# Patient Record
Sex: MALE | Race: WHITE | NOT HISPANIC OR LATINO | Employment: OTHER | URBAN - NONMETROPOLITAN AREA
[De-identification: names, ages, dates, MRNs, and addresses within clinical notes are randomized per-mention and may not be internally consistent; named-entity substitution may affect disease eponyms.]

---

## 2020-11-05 ENCOUNTER — HOSPITAL ENCOUNTER (EMERGENCY)
Facility: HOSPITAL | Age: 60
Discharge: HOME | End: 2020-11-05
Attending: EMERGENCY MEDICINE | Admitting: EMERGENCY MEDICINE

## 2020-11-05 VITALS
BODY MASS INDEX: 22.73 KG/M2 | SYSTOLIC BLOOD PRESSURE: 147 MMHG | HEIGHT: 68 IN | DIASTOLIC BLOOD PRESSURE: 78 MMHG | OXYGEN SATURATION: 98 % | WEIGHT: 150 LBS | TEMPERATURE: 97.9 F | RESPIRATION RATE: 16 BRPM | HEART RATE: 84 BPM

## 2020-11-05 DIAGNOSIS — S61.412A LACERATION OF LEFT HAND WITHOUT FOREIGN BODY, INITIAL ENCOUNTER: Primary | ICD-10-CM

## 2020-11-05 PROCEDURE — 90715 TDAP VACCINE 7 YRS/> IM: CPT | Performed by: NURSE PRACTITIONER

## 2020-11-05 PROCEDURE — 25010000002 TDAP 5-2.5-18.5 LF-MCG/0.5 SUSPENSION: Performed by: NURSE PRACTITIONER

## 2020-11-05 PROCEDURE — 99282 EMERGENCY DEPT VISIT SF MDM: CPT

## 2020-11-05 PROCEDURE — 90471 IMMUNIZATION ADMIN: CPT | Performed by: NURSE PRACTITIONER

## 2020-11-05 PROCEDURE — 25010000003 LIDOCAINE 1 % SOLUTION: Performed by: NURSE PRACTITIONER

## 2020-11-05 RX ORDER — LIDOCAINE HYDROCHLORIDE 10 MG/ML
10 INJECTION, SOLUTION INFILTRATION; PERINEURAL ONCE
Status: COMPLETED | OUTPATIENT
Start: 2020-11-05 | End: 2020-11-05

## 2020-11-05 RX ORDER — CEPHALEXIN 500 MG/1
500 CAPSULE ORAL 3 TIMES DAILY
Qty: 21 CAPSULE | Refills: 0 | Status: SHIPPED | OUTPATIENT
Start: 2020-11-05 | End: 2021-08-16

## 2020-11-05 RX ADMIN — TETANUS TOXOID, REDUCED DIPHTHERIA TOXOID AND ACELLULAR PERTUSSIS VACCINE, ADSORBED 0.5 ML: 5; 2.5; 8; 8; 2.5 SUSPENSION INTRAMUSCULAR at 14:57

## 2020-11-05 RX ADMIN — LIDOCAINE HYDROCHLORIDE 10 ML: 10 INJECTION, SOLUTION INFILTRATION; PERINEURAL at 14:10
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
## 2021-03-23 ENCOUNTER — IMMUNIZATION (OUTPATIENT)
Dept: VACCINE CLINIC | Facility: HOSPITAL | Age: 61
End: 2021-03-23

## 2021-03-23 PROCEDURE — 0001A: CPT | Performed by: INTERNAL MEDICINE

## 2021-03-23 PROCEDURE — 91300 HC SARSCOV02 VAC 30MCG/0.3ML IM: CPT | Performed by: INTERNAL MEDICINE

## 2021-04-13 ENCOUNTER — IMMUNIZATION (OUTPATIENT)
Dept: VACCINE CLINIC | Facility: HOSPITAL | Age: 61
End: 2021-04-13

## 2021-04-13 PROCEDURE — 0002A: CPT | Performed by: INTERNAL MEDICINE

## 2021-04-13 PROCEDURE — 91300 HC SARSCOV02 VAC 30MCG/0.3ML IM: CPT | Performed by: INTERNAL MEDICINE

## 2021-08-11 ENCOUNTER — HOSPITAL ENCOUNTER (EMERGENCY)
Facility: HOSPITAL | Age: 61
Discharge: HOME | End: 2021-08-12
Attending: EMERGENCY MEDICINE | Admitting: EMERGENCY MEDICINE

## 2021-08-11 DIAGNOSIS — I50.9 ACUTE CONGESTIVE HEART FAILURE, UNSPECIFIED HEART FAILURE TYPE (HCC): Primary | ICD-10-CM

## 2021-08-11 PROCEDURE — 93005 ELECTROCARDIOGRAM TRACING: CPT | Performed by: EMERGENCY MEDICINE

## 2021-08-11 PROCEDURE — 99284 EMERGENCY DEPT VISIT MOD MDM: CPT

## 2021-08-11 RX ORDER — SODIUM CHLORIDE 0.9 % (FLUSH) 0.9 %
10 SYRINGE (ML) INJECTION AS NEEDED
Status: DISCONTINUED | OUTPATIENT
Start: 2021-08-11 | End: 2021-08-12 | Stop reason: HOSPADM

## 2021-08-12 ENCOUNTER — APPOINTMENT (OUTPATIENT)
Dept: GENERAL RADIOLOGY | Facility: HOSPITAL | Age: 61
End: 2021-08-12

## 2021-08-12 ENCOUNTER — APPOINTMENT (OUTPATIENT)
Dept: CT IMAGING | Facility: HOSPITAL | Age: 61
End: 2021-08-12

## 2021-08-12 VITALS
TEMPERATURE: 98.2 F | OXYGEN SATURATION: 95 % | SYSTOLIC BLOOD PRESSURE: 127 MMHG | BODY MASS INDEX: 25.01 KG/M2 | WEIGHT: 165 LBS | HEART RATE: 81 BPM | HEIGHT: 68 IN | DIASTOLIC BLOOD PRESSURE: 83 MMHG | RESPIRATION RATE: 19 BRPM

## 2021-08-12 LAB
ALBUMIN SERPL-MCNC: 3.8 G/DL (ref 3.5–5.2)
ALBUMIN/GLOB SERPL: 1.2 G/DL
ALP SERPL-CCNC: 98 U/L (ref 39–117)
ALT SERPL W P-5'-P-CCNC: 104 U/L (ref 1–41)
ANION GAP SERPL CALCULATED.3IONS-SCNC: 12.6 MMOL/L (ref 5–15)
AST SERPL-CCNC: 97 U/L (ref 1–40)
BASOPHILS # BLD AUTO: 0.05 10*3/MM3 (ref 0–0.2)
BASOPHILS NFR BLD AUTO: 0.7 % (ref 0–1.5)
BILIRUB SERPL-MCNC: 0.3 MG/DL (ref 0–1.2)
BUN SERPL-MCNC: 18 MG/DL (ref 8–23)
BUN/CREAT SERPL: 17 (ref 7–25)
CALCIUM SPEC-SCNC: 9.2 MG/DL (ref 8.6–10.5)
CHLORIDE SERPL-SCNC: 98 MMOL/L (ref 98–107)
CO2 SERPL-SCNC: 23.4 MMOL/L (ref 22–29)
CREAT SERPL-MCNC: 1.06 MG/DL (ref 0.76–1.27)
DEPRECATED RDW RBC AUTO: 42 FL (ref 37–54)
EOSINOPHIL # BLD AUTO: 0.02 10*3/MM3 (ref 0–0.4)
EOSINOPHIL NFR BLD AUTO: 0.3 % (ref 0.3–6.2)
ERYTHROCYTE [DISTWIDTH] IN BLOOD BY AUTOMATED COUNT: 12.5 % (ref 12.3–15.4)
GFR SERPL CREATININE-BSD FRML MDRD: 71 ML/MIN/1.73
GLOBULIN UR ELPH-MCNC: 3.3 GM/DL
GLUCOSE SERPL-MCNC: 257 MG/DL (ref 65–99)
HCT VFR BLD AUTO: 40.2 % (ref 37.5–51)
HGB BLD-MCNC: 13.3 G/DL (ref 13–17.7)
HOLD SPECIMEN: NORMAL
HOLD SPECIMEN: NORMAL
IMM GRANULOCYTES # BLD AUTO: 0.05 10*3/MM3 (ref 0–0.05)
IMM GRANULOCYTES NFR BLD AUTO: 0.7 % (ref 0–0.5)
LYMPHOCYTES # BLD AUTO: 0.82 10*3/MM3 (ref 0.7–3.1)
LYMPHOCYTES NFR BLD AUTO: 11.1 % (ref 19.6–45.3)
MCH RBC QN AUTO: 30.1 PG (ref 26.6–33)
MCHC RBC AUTO-ENTMCNC: 33.1 G/DL (ref 31.5–35.7)
MCV RBC AUTO: 91 FL (ref 79–97)
MONOCYTES # BLD AUTO: 0.16 10*3/MM3 (ref 0.1–0.9)
MONOCYTES NFR BLD AUTO: 2.2 % (ref 5–12)
NEUTROPHILS NFR BLD AUTO: 6.28 10*3/MM3 (ref 1.7–7)
NEUTROPHILS NFR BLD AUTO: 85 % (ref 42.7–76)
NRBC BLD AUTO-RTO: 0 /100 WBC (ref 0–0.2)
NT-PROBNP SERPL-MCNC: 8803 PG/ML (ref 0–900)
PLATELET # BLD AUTO: 388 10*3/MM3 (ref 140–450)
PMV BLD AUTO: 9.6 FL (ref 6–12)
POTASSIUM SERPL-SCNC: 4 MMOL/L (ref 3.5–5.2)
PROT SERPL-MCNC: 7.1 G/DL (ref 6–8.5)
RBC # BLD AUTO: 4.42 10*6/MM3 (ref 4.14–5.8)
SARS-COV-2 RNA PNL SPEC NAA+PROBE: NOT DETECTED
SODIUM SERPL-SCNC: 134 MMOL/L (ref 136–145)
TROPONIN T SERPL-MCNC: 0.04 NG/ML (ref 0–0.03)
TROPONIN T SERPL-MCNC: 0.04 NG/ML (ref 0–0.03)
WBC # BLD AUTO: 7.38 10*3/MM3 (ref 3.4–10.8)
WHOLE BLOOD HOLD SPECIMEN: NORMAL

## 2021-08-12 PROCEDURE — 83880 ASSAY OF NATRIURETIC PEPTIDE: CPT | Performed by: EMERGENCY MEDICINE

## 2021-08-12 PROCEDURE — 93005 ELECTROCARDIOGRAM TRACING: CPT | Performed by: EMERGENCY MEDICINE

## 2021-08-12 PROCEDURE — 96374 THER/PROPH/DIAG INJ IV PUSH: CPT

## 2021-08-12 PROCEDURE — 25010000002 FUROSEMIDE PER 20 MG: Performed by: EMERGENCY MEDICINE

## 2021-08-12 PROCEDURE — 71045 X-RAY EXAM CHEST 1 VIEW: CPT

## 2021-08-12 PROCEDURE — 84484 ASSAY OF TROPONIN QUANT: CPT | Performed by: EMERGENCY MEDICINE

## 2021-08-12 PROCEDURE — 87635 SARS-COV-2 COVID-19 AMP PRB: CPT | Performed by: EMERGENCY MEDICINE

## 2021-08-12 PROCEDURE — 80053 COMPREHEN METABOLIC PANEL: CPT | Performed by: EMERGENCY MEDICINE

## 2021-08-12 PROCEDURE — 25010000002 IOPAMIDOL 61 % SOLUTION: Performed by: EMERGENCY MEDICINE

## 2021-08-12 PROCEDURE — 85025 COMPLETE CBC W/AUTO DIFF WBC: CPT | Performed by: EMERGENCY MEDICINE

## 2021-08-12 PROCEDURE — 71275 CT ANGIOGRAPHY CHEST: CPT

## 2021-08-12 RX ORDER — POTASSIUM CHLORIDE 20 MEQ/1
20 TABLET, EXTENDED RELEASE ORAL DAILY
Qty: 10 TABLET | Refills: 0 | Status: SHIPPED | OUTPATIENT
Start: 2021-08-12 | End: 2021-08-26 | Stop reason: SDUPTHER

## 2021-08-12 RX ORDER — FUROSEMIDE 40 MG/1
40 TABLET ORAL DAILY
Qty: 10 TABLET | Refills: 0 | Status: SHIPPED | OUTPATIENT
Start: 2021-08-12 | End: 2021-08-26 | Stop reason: SDUPTHER

## 2021-08-12 RX ORDER — FUROSEMIDE 10 MG/ML
40 INJECTION INTRAMUSCULAR; INTRAVENOUS ONCE
Status: COMPLETED | OUTPATIENT
Start: 2021-08-12 | End: 2021-08-12

## 2021-08-12 RX ADMIN — FUROSEMIDE 40 MG: 10 INJECTION, SOLUTION INTRAMUSCULAR; INTRAVENOUS at 00:49

## 2021-08-12 RX ADMIN — IOPAMIDOL 100 ML: 612 INJECTION, SOLUTION INTRAVENOUS at 03:06
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
## 2021-08-16 ENCOUNTER — OFFICE VISIT (OUTPATIENT)
Dept: CARDIOLOGY | Facility: CLINIC | Age: 61
End: 2021-08-16

## 2021-08-16 VITALS
DIASTOLIC BLOOD PRESSURE: 70 MMHG | HEIGHT: 68 IN | WEIGHT: 165 LBS | SYSTOLIC BLOOD PRESSURE: 136 MMHG | OXYGEN SATURATION: 97 % | BODY MASS INDEX: 25.01 KG/M2 | HEART RATE: 104 BPM

## 2021-08-16 DIAGNOSIS — Z72.0 TOBACCO USE: ICD-10-CM

## 2021-08-16 DIAGNOSIS — R06.02 SOB (SHORTNESS OF BREATH): ICD-10-CM

## 2021-08-16 DIAGNOSIS — J43.2 CENTRILOBULAR EMPHYSEMA (HCC): ICD-10-CM

## 2021-08-16 DIAGNOSIS — Z72.0 TOBACCO ABUSE: ICD-10-CM

## 2021-08-16 DIAGNOSIS — I10 ESSENTIAL HYPERTENSION: Primary | ICD-10-CM

## 2021-08-16 PROCEDURE — 99204 OFFICE O/P NEW MOD 45 MIN: CPT | Performed by: INTERNAL MEDICINE

## 2021-08-16 NOTE — PROGRESS NOTES
"    Subjective:     Encounter Date:08/16/2021      Patient ID: Tristan Castle is a 61 y.o. male.    Chief Complaint: Shortness of breath  HPI  This is a 61-year-old male patient who presents to cardiology clinic for evaluation of dyspnea.  The patient indicates that he developed acute onset of shortness of breath approximately 2 weeks ago.  This occurred both at rest and with activity.  The patient presented to the emergency room where he underwent a spiral CT scan of the chest which showed no evidence of pulmonary embolus.  The patient was demonstrated to have diffuse emphysema as well as evidence of interstitial pulmonary edema.  The patient's proBNP was severely elevated.  Cardiac troponins were minimally elevated in the 0.03 ng/dL range on serial testing.  The patient's twelve-lead electrocardiogram showed sinus rhythm with no ischemic ST-T wave changes or injury current.  The patient denied having chest discomfort at rest or with activity.  He has had no orthopnea PND or lower extremity edema.  He denies dizziness palpitations or syncope.  The patient has a history of hypertension and dyslipidemia but no personal history of diabetes.  The patient indicates that he was smoking 2 packs of cigarettes per day beginning at age 11.  He indicates he has \"cut back\" to approximately 1/2 pack of cigarettes per day.  He has never had an ischemic evaluation.  The patient indicates that he is unable to do treadmill exercise stress testing due to the severity of his dyspnea.  The following portions of the patient's history were reviewed and updated as appropriate: allergies, current medications, past family history, past medical history, past social history, past surgical history and problem  Review of Systems   Constitutional: Negative for chills, diaphoresis, fever, malaise/fatigue, weight gain and weight loss.   HENT: Negative for ear discharge, hearing loss, hoarse voice and nosebleeds.    Eyes: Negative for discharge, " double vision, pain and photophobia.   Cardiovascular: Positive for dyspnea on exertion. Negative for chest pain, claudication, cyanosis, irregular heartbeat, leg swelling, near-syncope, orthopnea, palpitations, paroxysmal nocturnal dyspnea and syncope.   Respiratory: Positive for shortness of breath. Negative for cough, hemoptysis, sputum production and wheezing.    Endocrine: Negative for cold intolerance, heat intolerance, polydipsia, polyphagia and polyuria.   Hematologic/Lymphatic: Negative for adenopathy and bleeding problem. Does not bruise/bleed easily.   Skin: Negative for color change, flushing, itching and rash.   Musculoskeletal: Negative for muscle cramps, muscle weakness, myalgias and stiffness.   Gastrointestinal: Negative for abdominal pain, diarrhea, hematemesis, hematochezia, nausea and vomiting.   Genitourinary: Negative for dysuria, frequency and nocturia.   Neurological: Negative for focal weakness, loss of balance, numbness, paresthesias and seizures.   Psychiatric/Behavioral: Negative for altered mental status, hallucinations and suicidal ideas.   Allergic/Immunologic: Negative for HIV exposure, hives and persistent infections.           Current Outpatient Medications:   •  furosemide (LASIX) 40 MG tablet, Take 1 tablet by mouth Daily., Disp: 10 tablet, Rfl: 0  •  potassium chloride (K-DUR,KLOR-CON) 20 MEQ CR tablet, Take 1 tablet by mouth Daily., Disp: 10 tablet, Rfl: 0    Objective:   Vitals and nursing note reviewed.   Constitutional:       Appearance: Healthy appearance. Not in distress.   Neck:      Vascular: No JVR. JVD normal.   Pulmonary:      Effort: Pulmonary effort is normal.      Breath sounds: Normal breath sounds. No wheezing. No rhonchi. No rales.   Chest:      Chest wall: Not tender to palpatation.   Cardiovascular:      PMI at left midclavicular line. Normal rate. Regular rhythm. Normal S1. Normal S2.      Murmurs: There is no murmur.      No gallop. No click. No rub.  "  Pulses:     Intact distal pulses.   Edema:     Peripheral edema absent.   Abdominal:      General: Bowel sounds are normal.      Palpations: Abdomen is soft.      Tenderness: There is no abdominal tenderness.   Musculoskeletal: Normal range of motion.         General: No tenderness. Skin:     General: Skin is warm and dry.   Neurological:      General: No focal deficit present.      Mental Status: Alert and oriented to person, place and time.       Blood pressure 136/70, pulse 104, height 172.7 cm (68\"), weight 74.8 kg (165 lb), SpO2 97 %.   Lab Review:     Assessment:       1. Essential hypertension  Less than ideal blood pressure control.  - Stress Test With Myocardial Perfusion Two Day  - Adult Transthoracic Echo Complete W/ Cont if Necessary Per Protocol    2. SOB (shortness of breath)  Multifactorial.  Shortness of breath with activity that is relieved with rest is typical of the diagnosis of angina pectoris.  The patient has multiple risk factors for coronary artery disease and has never had an ischemic evaluation.  He is unable to do treadmill exercise stress testing due to the severity of his shortness of breath-effort limiting dyspnea during instrumental activities of daily living.  In addition, the patient appears to have new onset congestive heart failure.  Left ventricular ejection fraction is unknown.  There is certainly an element related to his underlying lung disease and ongoing tobacco use.  - Stress Test With Myocardial Perfusion Two Day  - Adult Transthoracic Echo Complete W/ Cont if Necessary Per Protocol    3. Tobacco abuse  Ongoing daily tobacco abuse.  The patient thinks that he can quit smoking \"by cutting back\".    4. Centrilobular emphysema (CMS/HCC)  Typical tobacco related lung disease.        Procedures    Plan:     I have recommended a vasodilator nuclear stress test utilizing a 2-day imaging protocol with supine and stress prone images in order to help mitigate for attenuation " artifact given the patient's body habitus.  I have recommended an echocardiogram.  The patient has been counseled regarding the essential need to discontinue all forms of tobacco exposure.  Further recommendations will be predicated on the results of his outpatient testing.

## 2021-08-26 ENCOUNTER — HOSPITAL ENCOUNTER (EMERGENCY)
Facility: HOSPITAL | Age: 61
Discharge: HOME | End: 2021-08-26
Attending: FAMILY MEDICINE | Admitting: FAMILY MEDICINE

## 2021-08-26 ENCOUNTER — APPOINTMENT (OUTPATIENT)
Dept: GENERAL RADIOLOGY | Facility: HOSPITAL | Age: 61
End: 2021-08-26

## 2021-08-26 VITALS
HEIGHT: 68 IN | HEART RATE: 95 BPM | TEMPERATURE: 98.4 F | BODY MASS INDEX: 25.07 KG/M2 | SYSTOLIC BLOOD PRESSURE: 133 MMHG | RESPIRATION RATE: 28 BRPM | DIASTOLIC BLOOD PRESSURE: 76 MMHG | WEIGHT: 165.4 LBS | OXYGEN SATURATION: 95 %

## 2021-08-26 DIAGNOSIS — I50.9 ACUTE ON CHRONIC CONGESTIVE HEART FAILURE, UNSPECIFIED HEART FAILURE TYPE (HCC): Primary | ICD-10-CM

## 2021-08-26 LAB
A-A DO2: 44.4 MMHG
ALBUMIN SERPL-MCNC: 4.2 G/DL (ref 3.5–5.2)
ALBUMIN/GLOB SERPL: 1.2 G/DL
ALP SERPL-CCNC: 83 U/L (ref 39–117)
ALT SERPL W P-5'-P-CCNC: 23 U/L (ref 1–41)
ANION GAP SERPL CALCULATED.3IONS-SCNC: 10.1 MMOL/L (ref 5–15)
ARTERIAL PATENCY WRIST A: POSITIVE
AST SERPL-CCNC: 23 U/L (ref 1–40)
ATMOSPHERIC PRESS: 738 MMHG
BASE EXCESS BLDA CALC-SCNC: 0.7 MMOL/L (ref 0–2)
BASOPHILS # BLD AUTO: 0.08 10*3/MM3 (ref 0–0.2)
BASOPHILS NFR BLD AUTO: 0.7 % (ref 0–1.5)
BDY SITE: ABNORMAL
BILIRUB SERPL-MCNC: 0.5 MG/DL (ref 0–1.2)
BILIRUB UR QL STRIP: NEGATIVE
BUN SERPL-MCNC: 16 MG/DL (ref 8–23)
BUN/CREAT SERPL: 15.2 (ref 7–25)
CALCIUM SPEC-SCNC: 9.4 MG/DL (ref 8.6–10.5)
CHLORIDE SERPL-SCNC: 101 MMOL/L (ref 98–107)
CLARITY UR: CLEAR
CO2 SERPL-SCNC: 24.9 MMOL/L (ref 22–29)
COHGB MFR BLD: 1.5 % (ref 0–2)
COLOR UR: YELLOW
CREAT SERPL-MCNC: 1.05 MG/DL (ref 0.76–1.27)
CRP SERPL-MCNC: 0.54 MG/DL (ref 0–0.5)
DEPRECATED RDW RBC AUTO: 40.4 FL (ref 37–54)
EOSINOPHIL # BLD AUTO: 0.19 10*3/MM3 (ref 0–0.4)
EOSINOPHIL NFR BLD AUTO: 1.6 % (ref 0.3–6.2)
ERYTHROCYTE [DISTWIDTH] IN BLOOD BY AUTOMATED COUNT: 12.2 % (ref 12.3–15.4)
GFR SERPL CREATININE-BSD FRML MDRD: 72 ML/MIN/1.73
GLOBULIN UR ELPH-MCNC: 3.5 GM/DL
GLUCOSE SERPL-MCNC: 121 MG/DL (ref 65–99)
GLUCOSE UR STRIP-MCNC: NEGATIVE MG/DL
HCO3 BLDA-SCNC: 24.6 MMOL/L (ref 22–28)
HCT VFR BLD AUTO: 42.4 % (ref 37.5–51)
HCT VFR BLD CALC: 39.6 %
HGB BLD-MCNC: 13.9 G/DL (ref 13–17.7)
HGB UR QL STRIP.AUTO: NEGATIVE
HOLD SPECIMEN: NORMAL
HOLD SPECIMEN: NORMAL
IMM GRANULOCYTES # BLD AUTO: 0.05 10*3/MM3 (ref 0–0.05)
IMM GRANULOCYTES NFR BLD AUTO: 0.4 % (ref 0–0.5)
INHALED O2 CONCENTRATION: 21 %
KETONES UR QL STRIP: NEGATIVE
LEUKOCYTE ESTERASE UR QL STRIP.AUTO: NEGATIVE
LYMPHOCYTES # BLD AUTO: 3.26 10*3/MM3 (ref 0.7–3.1)
LYMPHOCYTES NFR BLD AUTO: 27.9 % (ref 19.6–45.3)
Lab: ABNORMAL
MCH RBC QN AUTO: 29.6 PG (ref 26.6–33)
MCHC RBC AUTO-ENTMCNC: 32.8 G/DL (ref 31.5–35.7)
MCV RBC AUTO: 90.2 FL (ref 79–97)
METHGB BLD QL: 0.3 % (ref 0–1.5)
MODALITY: ABNORMAL
MONOCYTES # BLD AUTO: 0.68 10*3/MM3 (ref 0.1–0.9)
MONOCYTES NFR BLD AUTO: 5.8 % (ref 5–12)
NEUTROPHILS NFR BLD AUTO: 63.6 % (ref 42.7–76)
NEUTROPHILS NFR BLD AUTO: 7.42 10*3/MM3 (ref 1.7–7)
NITRITE UR QL STRIP: NEGATIVE
NOTE: ABNORMAL
NRBC BLD AUTO-RTO: 0 /100 WBC (ref 0–0.2)
NT-PROBNP SERPL-MCNC: 6809 PG/ML (ref 0–900)
OXYHGB MFR BLDV: 91.7 % (ref 94–99)
PCO2 BLDA: 36.2 MM HG (ref 35–45)
PCO2 TEMP ADJ BLD: ABNORMAL MM[HG]
PH BLDA: 7.44 PH UNITS (ref 7.35–7.45)
PH UR STRIP.AUTO: 7 [PH] (ref 5–8)
PH, TEMP CORRECTED: ABNORMAL
PLATELET # BLD AUTO: 315 10*3/MM3 (ref 140–450)
PMV BLD AUTO: 9.2 FL (ref 6–12)
PO2 BLDA: 59.9 MM HG (ref 75–100)
PO2 TEMP ADJ BLD: ABNORMAL MM[HG]
POTASSIUM SERPL-SCNC: 4.2 MMOL/L (ref 3.5–5.2)
PROT SERPL-MCNC: 7.7 G/DL (ref 6–8.5)
PROT UR QL STRIP: NEGATIVE
RBC # BLD AUTO: 4.7 10*6/MM3 (ref 4.14–5.8)
SAO2 % BLDCOA: 93.4 % (ref 94–100)
SODIUM SERPL-SCNC: 136 MMOL/L (ref 136–145)
SP GR UR STRIP: 1.01 (ref 1–1.03)
TROPONIN T SERPL-MCNC: 0.02 NG/ML (ref 0–0.03)
UROBILINOGEN UR QL STRIP: NORMAL
VENTILATOR MODE: ABNORMAL
WBC # BLD AUTO: 11.68 10*3/MM3 (ref 3.4–10.8)
WHOLE BLOOD HOLD SPECIMEN: NORMAL
WHOLE BLOOD HOLD SPECIMEN: NORMAL

## 2021-08-26 PROCEDURE — 25010000002 FUROSEMIDE PER 20 MG: Performed by: FAMILY MEDICINE

## 2021-08-26 PROCEDURE — 71045 X-RAY EXAM CHEST 1 VIEW: CPT

## 2021-08-26 PROCEDURE — 36600 WITHDRAWAL OF ARTERIAL BLOOD: CPT

## 2021-08-26 PROCEDURE — 83050 HGB METHEMOGLOBIN QUAN: CPT

## 2021-08-26 PROCEDURE — 25010000002 METHYLPREDNISOLONE PER 125 MG: Performed by: FAMILY MEDICINE

## 2021-08-26 PROCEDURE — 84484 ASSAY OF TROPONIN QUANT: CPT | Performed by: FAMILY MEDICINE

## 2021-08-26 PROCEDURE — 83880 ASSAY OF NATRIURETIC PEPTIDE: CPT | Performed by: FAMILY MEDICINE

## 2021-08-26 PROCEDURE — 99283 EMERGENCY DEPT VISIT LOW MDM: CPT

## 2021-08-26 PROCEDURE — 86140 C-REACTIVE PROTEIN: CPT | Performed by: FAMILY MEDICINE

## 2021-08-26 PROCEDURE — 94640 AIRWAY INHALATION TREATMENT: CPT

## 2021-08-26 PROCEDURE — 96375 TX/PRO/DX INJ NEW DRUG ADDON: CPT

## 2021-08-26 PROCEDURE — 85025 COMPLETE CBC W/AUTO DIFF WBC: CPT | Performed by: FAMILY MEDICINE

## 2021-08-26 PROCEDURE — 96374 THER/PROPH/DIAG INJ IV PUSH: CPT

## 2021-08-26 PROCEDURE — 80053 COMPREHEN METABOLIC PANEL: CPT | Performed by: FAMILY MEDICINE

## 2021-08-26 PROCEDURE — 93005 ELECTROCARDIOGRAM TRACING: CPT | Performed by: FAMILY MEDICINE

## 2021-08-26 PROCEDURE — 82375 ASSAY CARBOXYHB QUANT: CPT

## 2021-08-26 PROCEDURE — 94799 UNLISTED PULMONARY SVC/PX: CPT

## 2021-08-26 PROCEDURE — 82805 BLOOD GASES W/O2 SATURATION: CPT

## 2021-08-26 PROCEDURE — 81003 URINALYSIS AUTO W/O SCOPE: CPT | Performed by: FAMILY MEDICINE

## 2021-08-26 RX ORDER — FUROSEMIDE 40 MG/1
40 TABLET ORAL DAILY
Qty: 6 TABLET | Refills: 0 | Status: SHIPPED | OUTPATIENT
Start: 2021-08-26 | End: 2021-08-30 | Stop reason: SDUPTHER

## 2021-08-26 RX ORDER — FUROSEMIDE 10 MG/ML
40 INJECTION INTRAMUSCULAR; INTRAVENOUS ONCE
Status: COMPLETED | OUTPATIENT
Start: 2021-08-26 | End: 2021-08-26

## 2021-08-26 RX ORDER — METHYLPREDNISOLONE SODIUM SUCCINATE 125 MG/2ML
125 INJECTION, POWDER, LYOPHILIZED, FOR SOLUTION INTRAMUSCULAR; INTRAVENOUS ONCE
Status: COMPLETED | OUTPATIENT
Start: 2021-08-26 | End: 2021-08-26

## 2021-08-26 RX ORDER — POTASSIUM CHLORIDE 20 MEQ/1
20 TABLET, EXTENDED RELEASE ORAL DAILY
Qty: 6 TABLET | Refills: 0 | Status: SHIPPED | OUTPATIENT
Start: 2021-08-26 | End: 2021-09-01

## 2021-08-26 RX ORDER — SODIUM CHLORIDE 0.9 % (FLUSH) 0.9 %
10 SYRINGE (ML) INJECTION AS NEEDED
Status: DISCONTINUED | OUTPATIENT
Start: 2021-08-26 | End: 2021-08-26 | Stop reason: HOSPADM

## 2021-08-26 RX ORDER — IPRATROPIUM BROMIDE AND ALBUTEROL SULFATE 2.5; .5 MG/3ML; MG/3ML
3 SOLUTION RESPIRATORY (INHALATION) ONCE
Status: COMPLETED | OUTPATIENT
Start: 2021-08-26 | End: 2021-08-26

## 2021-08-26 RX ADMIN — METHYLPREDNISOLONE SODIUM SUCCINATE 125 MG: 125 INJECTION, POWDER, FOR SOLUTION INTRAMUSCULAR; INTRAVENOUS at 02:58

## 2021-08-26 RX ADMIN — IPRATROPIUM BROMIDE AND ALBUTEROL SULFATE 3 ML: .5; 3 SOLUTION RESPIRATORY (INHALATION) at 02:14

## 2021-08-26 RX ADMIN — FUROSEMIDE 40 MG: 10 INJECTION INTRAMUSCULAR; INTRAVENOUS at 02:59
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
## 2021-08-30 ENCOUNTER — TELEPHONE (OUTPATIENT)
Dept: CARDIOLOGY | Facility: CLINIC | Age: 61
End: 2021-08-30

## 2021-08-30 DIAGNOSIS — I50.9 ACUTE CONGESTIVE HEART FAILURE, UNSPECIFIED HEART FAILURE TYPE (HCC): Primary | ICD-10-CM

## 2021-08-30 RX ORDER — FUROSEMIDE 40 MG/1
40 TABLET ORAL 2 TIMES DAILY
Qty: 6 TABLET | Refills: 0 | Status: SHIPPED | OUTPATIENT
Start: 2021-08-30 | End: 2021-09-01

## 2021-08-30 NOTE — TELEPHONE ENCOUNTER
Patient was recently seen in [facility]  for CHF and SOA. Patient was given a 6 day supply of lasix 40 mg qday for his fluid buildup. States he is still having SOA and will soon be out of the medication. Patient is requesting a refill if possible or wanting to know if he should be seen before he can have a refill.  Please advise.    Patients pharmacy is [facility] in [place].

## 2021-08-30 NOTE — TELEPHONE ENCOUNTER
Spoke with patient, information was given and understood. Patient was scheduled for a follow up with JOSE Marquez post echo appointment on 9/1/2021.

## 2021-08-30 NOTE — TELEPHONE ENCOUNTER
Called in more lasix.  Take one this evening and one in the morning.  I will see him in the office tomorrow. I called him in 3 days worth if he takes twice a day.  Also I called him in some STAT labs he can complete before he comes in (if he can). Thanks!

## 2021-08-31 ENCOUNTER — TELEPHONE (OUTPATIENT)
Dept: CARDIOLOGY | Facility: CLINIC | Age: 61
End: 2021-08-31

## 2021-08-31 ENCOUNTER — LAB (OUTPATIENT)
Dept: LAB | Facility: HOSPITAL | Age: 61
End: 2021-08-31

## 2021-08-31 DIAGNOSIS — I50.9 ACUTE CONGESTIVE HEART FAILURE, UNSPECIFIED HEART FAILURE TYPE (HCC): ICD-10-CM

## 2021-08-31 LAB
ANION GAP SERPL CALCULATED.3IONS-SCNC: 10.2 MMOL/L (ref 5–15)
BUN SERPL-MCNC: 32 MG/DL (ref 8–23)
BUN/CREAT SERPL: 27.1 (ref 7–25)
CALCIUM SPEC-SCNC: 9.3 MG/DL (ref 8.6–10.5)
CHLORIDE SERPL-SCNC: 97 MMOL/L (ref 98–107)
CO2 SERPL-SCNC: 27.8 MMOL/L (ref 22–29)
CREAT SERPL-MCNC: 1.18 MG/DL (ref 0.76–1.27)
GFR SERPL CREATININE-BSD FRML MDRD: 63 ML/MIN/1.73
GLUCOSE SERPL-MCNC: 155 MG/DL (ref 65–99)
NT-PROBNP SERPL-MCNC: 6368 PG/ML (ref 0–900)
POTASSIUM SERPL-SCNC: 3.9 MMOL/L (ref 3.5–5.2)
SODIUM SERPL-SCNC: 135 MMOL/L (ref 136–145)

## 2021-08-31 PROCEDURE — 36415 COLL VENOUS BLD VENIPUNCTURE: CPT

## 2021-08-31 PROCEDURE — 83880 ASSAY OF NATRIURETIC PEPTIDE: CPT

## 2021-08-31 PROCEDURE — 80048 BASIC METABOLIC PNL TOTAL CA: CPT

## 2021-08-31 NOTE — TELEPHONE ENCOUNTER
Lab called with abn values on patient felicity Castle that you are to see tomorrow. He is also having an ECHO tomorrow.    ProBNP 6,368

## 2021-09-01 ENCOUNTER — TELEPHONE (OUTPATIENT)
Dept: CARDIOLOGY | Facility: CLINIC | Age: 61
End: 2021-09-01

## 2021-09-01 ENCOUNTER — OFFICE VISIT (OUTPATIENT)
Dept: CARDIOLOGY | Facility: CLINIC | Age: 61
End: 2021-09-01

## 2021-09-01 VITALS
BODY MASS INDEX: 24.71 KG/M2 | RESPIRATION RATE: 18 BRPM | DIASTOLIC BLOOD PRESSURE: 82 MMHG | WEIGHT: 163 LBS | SYSTOLIC BLOOD PRESSURE: 132 MMHG | HEART RATE: 105 BPM | OXYGEN SATURATION: 96 % | HEIGHT: 68 IN

## 2021-09-01 DIAGNOSIS — I10 ESSENTIAL HYPERTENSION: Primary | ICD-10-CM

## 2021-09-01 DIAGNOSIS — R79.89 ELEVATED BRAIN NATRIURETIC PEPTIDE (BNP) LEVEL: ICD-10-CM

## 2021-09-01 PROCEDURE — 99213 OFFICE O/P EST LOW 20 MIN: CPT | Performed by: NURSE PRACTITIONER

## 2021-09-01 RX ORDER — FUROSEMIDE 40 MG/1
TABLET ORAL
Qty: 9 TABLET | Refills: 0 | Status: SHIPPED | OUTPATIENT
Start: 2021-09-01 | End: 2021-09-07

## 2021-09-01 RX ORDER — POTASSIUM CHLORIDE 20 MEQ/1
20 TABLET, EXTENDED RELEASE ORAL DAILY
Qty: 3 TABLET | Refills: 0 | Status: SHIPPED | OUTPATIENT
Start: 2021-09-01 | End: 2021-09-09

## 2021-09-01 RX ORDER — CARVEDILOL 3.12 MG/1
3.12 TABLET ORAL 2 TIMES DAILY
Qty: 60 TABLET | Refills: 11 | Status: SHIPPED | OUTPATIENT
Start: 2021-09-01 | End: 2021-09-21

## 2021-09-01 NOTE — PROGRESS NOTES
"             [facility] Cardiology Office Follow Up Note    Tristan Castle  [id number]  09/01/2021    Primary Care Provider: Marybel Fleming MD   Referring Provider: No ref. provider found    Chief Complaint: Dyspnea    History of Present Illness:   Mr. Tristan Castle is a 61 y.o. male who presents to the Cardiology Clinic for follow up of dyspnea.  The patient recently presented to the ED with a 2-week history of acute onset of shortness of breath.  This occurred both at rest and with activity.  He underwent a spiral CT scan of the chest which showed no evidence of pulmonary embolus.  The patient was demonstrated to have diffuse emphysema as well as evidence of interstitial pulmonary edema.  The patient's proBNP was severely elevated.  Cardiac troponins were minimally elevated in the 0.03 ng/dL range on serial testing.  The patient's twelve-lead electrocardiogram showed sinus rhythm with no ischemic ST-T wave changes or injury current.  The patient denied having chest discomfort at rest or with activity.  The patient has a history of hypertension and dyslipidemia but no personal history of diabetes.  The patient has a history of a 2 pack per day cigarettes habit beginning at age 11.  He recently indicated that he had \"cut back\" to approximately 1/2 pack of cigarettes per day.  He has never had an ischemic evaluation.  An echocardiogram and vasodilator nuclear stress test was order.  The patient was prescribed a short course of twice daily Lasix.  He presents today for follow-up.  The patient reports ongoing dyspnea on exertion and with lying down x3 days.  He reports minimal improvement since he started twice daily Lasix approximately 3 days ago.  He is currently not on a fluid- or sodium-restricted diet.  He specifically denies chest pain, palpitations, lower extremity edema.  The patient reports that he is \"smothering\" to the extent he finds it difficult to sleep.  He does tell me he has a primary care " "provider, Dr. Fleming, but he has not seen her in \"years\".  He is unsure if he has diabetes or high cholesterol.  He does report having neuropathy in his feet.  His wife Vikram accompanies him today and reports that he is going to have a primary care appointment scheduled in the near future.  The patient is also confused because he thought he was to undergo a nuclear stress test today.  He is trying to quit smoking.  He offers no other complaints or concerns at this time.    Past Cardiac Testin. Last Coronary Angio: None  2. Prior Stress Testing: Pending  3. Last Echo: Pending  4. Prior Holter Monitor: None    Review of Systems:   Review of Systems   Constitutional: Negative for activity change, chills, diaphoresis, fatigue, fever and unexpected weight gain.   Eyes: Negative for blurred vision and visual disturbance.   Respiratory: Positive for shortness of breath. Negative for apnea, cough, chest tightness and wheezing.    Cardiovascular: Negative for chest pain, palpitations and leg swelling.   Gastrointestinal: Negative for abdominal distention, blood in stool, GERD and indigestion.   Endocrine: Negative for cold intolerance and heat intolerance.   Genitourinary: Negative for hematuria.   Musculoskeletal: Negative for gait problem, joint swelling and myalgias.   Skin: Negative for color change, pallor and bruise.   Neurological: Negative for dizziness, seizures, syncope, weakness, light-headedness, numbness, headache and confusion.   Hematological: Does not bruise/bleed easily.   Psychiatric/Behavioral: Negative for behavioral problems, sleep disturbance, suicidal ideas and depressed mood.     I have reviewed and confirmed the accuracy of the ROS as documented by the MA/TERESO/RN JOSE Ching    I have reviewed and/or updated the patient's past medical, past surgical, family, social history, problem list and allergies as appropriate.     Medications:     Current Outpatient Medications:   •  " "carvedilol (COREG) 3.125 MG tablet, Take 1 tablet by mouth 2 (Two) Times a Day., Disp: 60 tablet, Rfl: 11  •  furosemide (LASIX) 40 MG tablet, TAKE 2 TABLETS IN Q AM.  TAKE 1 TABLET IN THE AFTERNOON., Disp: 9 tablet, Rfl: 0  •  potassium chloride (K-DUR,KLOR-CON) 20 MEQ CR tablet, Take 1 tablet by mouth Daily., Disp: 3 tablet, Rfl: 0    Physical Exam:  Vital Signs:   Vitals:    09/01/21 1255   BP: 132/82   BP Location: Left arm   Patient Position: Sitting   Pulse: 105   Resp: 18   SpO2: 96%   Weight: 73.9 kg (163 lb)   Height: 172.7 cm (68\")     Body mass index is 24.78 kg/m².    Physical Exam  Vitals and nursing note reviewed.   Constitutional:       General: He is not in acute distress.     Appearance: Normal appearance. He is well-developed.   HENT:      Head: Normocephalic and atraumatic.      Mouth/Throat:      Mouth: Mucous membranes are moist.   Eyes:      General: No scleral icterus.     Extraocular Movements: Extraocular movements intact.   Neck:      Trachea: Trachea normal.   Cardiovascular:      Rate and Rhythm: Normal rate and regular rhythm.      Pulses: Normal pulses.      Heart sounds: Normal heart sounds. No murmur heard.   No friction rub. No gallop.    Pulmonary:      Effort: Pulmonary effort is normal.      Breath sounds: Normal breath sounds.      Comments: Rhonchi in left middle lobe.  Wheezing in right lower lobe.  No crackles appreciated.  Abdominal:      Palpations: Abdomen is soft.      Tenderness: There is no abdominal tenderness.   Musculoskeletal:         General: Normal range of motion.      Cervical back: Neck supple.      Right lower leg: No edema.      Left lower leg: No edema.   Skin:     General: Skin is warm and dry.      Findings: No bruising, lesion or rash.   Neurological:      Mental Status: He is alert and oriented to person, place, and time.      Motor: No weakness.      Gait: Gait normal.   Psychiatric:         Mood and Affect: Mood normal.         Behavior: Behavior " normal. Behavior is cooperative.         Thought Content: Thought content does not include suicidal ideation.         Results Review:   I reviewed the patient's new clinical results.      Assessment / Plan:     1. Essential hypertension (Primary)  --Less than ideal in the presence of tachycardia and a recent elevated BNP  --Echocardiogram pending  --START carvedilol  --Follow-up with Dr. Meyers next week as scheduled    2. Elevated brain natriuretic peptide (BNP) level  --Minimal improvement of BNP (6809 on 8/26 and 6368 on 8/31)  --Most recent creatinine 1.18 (marginally increased from 1.05 on 8/26)  --Increase Lasix to 80 mg q am and 40 mg in the afternoon x's 3 days  --Continue potassium chloride supplements x's 3 days to be taken with am diuretic  --Educated patient to adhere to strict sodium (1500 mg) and fluid (1.5 liter) restrictions  --Patient counseled regarding foods high in sodium foods and advised to avoid these  --Sodium log given to patient  --Follow-up with Dr. Meyers next week as scheduled      Preventative Cardiology:   Tobacco Cessation: Cessation Counseling Provided    Advance Care Planning: ACP discussion was held with the patient during this visit. Patient does not have an advance directive, information provided.     Follow Up:   Return in about 1 week (around 9/8/2021) for Follow-up with Dr. Meyers.      Thank you for allowing me to participate in the care of your patient. Please to not hesitate to contact me with additional questions or concerns.     JOSE Moya

## 2021-09-01 NOTE — TELEPHONE ENCOUNTER
Can someone please call this patient at home and let him know that he can call the following number to schedule his nuclear stress test in the hospital?  The phone number is [phone].  It looks like the hospital has left a few voicemails for him this week but they have not been able to contact him yet.  Thanks.

## 2021-09-02 LAB
BH CV ECHO MEAS - % IVS THICK: 5 %
BH CV ECHO MEAS - % LVPW THICK: 20 %
BH CV ECHO MEAS - AI DEC SLOPE: 169 CM/SEC^2
BH CV ECHO MEAS - AI MAX PG: 33.4 MMHG
BH CV ECHO MEAS - AI MAX VEL: 289 CM/SEC
BH CV ECHO MEAS - AI P1/2T: 500.9 MSEC
BH CV ECHO MEAS - AO MAX PG (FULL): 19.7 MMHG
BH CV ECHO MEAS - AO MAX PG: 22 MMHG
BH CV ECHO MEAS - AO MEAN PG (FULL): 7.6 MMHG
BH CV ECHO MEAS - AO MEAN PG: 8.6 MMHG
BH CV ECHO MEAS - AO ROOT AREA (BSA CORRECTED): 0.8
BH CV ECHO MEAS - AO ROOT AREA: 1.8 CM^2
BH CV ECHO MEAS - AO ROOT DIAM: 1.5 CM
BH CV ECHO MEAS - AO V2 MAX: 236.2 CM/SEC
BH CV ECHO MEAS - AO V2 MEAN: 131.8 CM/SEC
BH CV ECHO MEAS - AO V2 VTI: 32.1 CM
BH CV ECHO MEAS - AVA(I,A): 0.94 CM^2
BH CV ECHO MEAS - AVA(I,D): 0.94 CM^2
BH CV ECHO MEAS - AVA(V,A): 1 CM^2
BH CV ECHO MEAS - AVA(V,D): 1 CM^2
BH CV ECHO MEAS - BSA(HAYCOCK): 1.9 M^2
BH CV ECHO MEAS - BSA: 1.9 M^2
BH CV ECHO MEAS - BZI_BMI: 24.8 KILOGRAMS/M^2
BH CV ECHO MEAS - BZI_METRIC_HEIGHT: 172.7 CM
BH CV ECHO MEAS - BZI_METRIC_WEIGHT: 73.9 KG
BH CV ECHO MEAS - EDV(CUBED): 153.1 ML
BH CV ECHO MEAS - EDV(MOD-SP4): 206 ML
BH CV ECHO MEAS - EDV(TEICH): 138.3 ML
BH CV ECHO MEAS - EF(CUBED): 48.1 %
BH CV ECHO MEAS - EF(MOD-SP4): 34 %
BH CV ECHO MEAS - EF(TEICH): 39.9 %
BH CV ECHO MEAS - ESV(CUBED): 79.5 ML
BH CV ECHO MEAS - ESV(MOD-SP4): 136 ML
BH CV ECHO MEAS - ESV(TEICH): 83.1 ML
BH CV ECHO MEAS - FS: 19.6 %
BH CV ECHO MEAS - IVS/LVPW: 1
BH CV ECHO MEAS - IVSD: 1 CM
BH CV ECHO MEAS - IVSS: 1.1 CM
BH CV ECHO MEAS - LA DIMENSION: 4.4 CM
BH CV ECHO MEAS - LA/AO: 2.9
BH CV ECHO MEAS - LAD MAJOR: 6.1 CM
BH CV ECHO MEAS - LAT PEAK E' VEL: 9.5 CM/SEC
BH CV ECHO MEAS - LATERAL E/E' RATIO: 9.6
BH CV ECHO MEAS - LV DIASTOLIC VOL/BSA (35-75): 109.9 ML/M^2
BH CV ECHO MEAS - LV IVRT: 0.07 SEC
BH CV ECHO MEAS - LV MASS(C)D: 203.6 GRAMS
BH CV ECHO MEAS - LV MASS(C)DI: 108.6 GRAMS/M^2
BH CV ECHO MEAS - LV MASS(C)S: 168.3 GRAMS
BH CV ECHO MEAS - LV MASS(C)SI: 89.8 GRAMS/M^2
BH CV ECHO MEAS - LV MAX PG: 2.3 MMHG
BH CV ECHO MEAS - LV MEAN PG: 1 MMHG
BH CV ECHO MEAS - LV SYSTOLIC VOL/BSA (12-30): 72.6 ML/M^2
BH CV ECHO MEAS - LV V1 MAX: 75.8 CM/SEC
BH CV ECHO MEAS - LV V1 MEAN: 45.6 CM/SEC
BH CV ECHO MEAS - LV V1 VTI: 9.6 CM
BH CV ECHO MEAS - LVIDD: 5.4 CM
BH CV ECHO MEAS - LVIDS: 4.3 CM
BH CV ECHO MEAS - LVLD AP4: 9.6 CM
BH CV ECHO MEAS - LVLS AP4: 9.1 CM
BH CV ECHO MEAS - LVOT AREA (M): 3.1 CM^2
BH CV ECHO MEAS - LVOT AREA: 3.1 CM^2
BH CV ECHO MEAS - LVOT DIAM: 2 CM
BH CV ECHO MEAS - LVPWD: 1 CM
BH CV ECHO MEAS - LVPWS: 1.2 CM
BH CV ECHO MEAS - MED PEAK E' VEL: 7.2 CM/SEC
BH CV ECHO MEAS - MEDIAL E/E' RATIO: 12.6
BH CV ECHO MEAS - MR MAX PG: 85 MMHG
BH CV ECHO MEAS - MR MAX VEL: 460 CM/SEC
BH CV ECHO MEAS - MR MEAN PG: 54 MMHG
BH CV ECHO MEAS - MR MEAN VEL: 348 CM/SEC
BH CV ECHO MEAS - MR VTI: 111 CM
BH CV ECHO MEAS - MV DEC SLOPE: 425 CM/SEC^2
BH CV ECHO MEAS - MV DEC TIME: 0.24 SEC
BH CV ECHO MEAS - MV E MAX VEL: 91.4 CM/SEC
BH CV ECHO MEAS - MV MAX PG: 3 MMHG
BH CV ECHO MEAS - MV MEAN PG: 1 MMHG
BH CV ECHO MEAS - MV P1/2T MAX VEL: 93.5 CM/SEC
BH CV ECHO MEAS - MV P1/2T: 64.4 MSEC
BH CV ECHO MEAS - MV V2 MAX: 86.9 CM/SEC
BH CV ECHO MEAS - MV V2 MEAN: 53.8 CM/SEC
BH CV ECHO MEAS - MV V2 VTI: 19.8 CM
BH CV ECHO MEAS - MVA P1/2T LCG: 2.4 CM^2
BH CV ECHO MEAS - MVA(P1/2T): 3.4 CM^2
BH CV ECHO MEAS - MVA(VTI): 1.5 CM^2
BH CV ECHO MEAS - PA MAX PG: 1.5 MMHG
BH CV ECHO MEAS - PA V2 MAX: 61.2 CM/SEC
BH CV ECHO MEAS - SI(AO): 30.3 ML/M^2
BH CV ECHO MEAS - SI(CUBED): 39.3 ML/M^2
BH CV ECHO MEAS - SI(LVOT): 16.1 ML/M^2
BH CV ECHO MEAS - SI(MOD-SP4): 37.4 ML/M^2
BH CV ECHO MEAS - SI(TEICH): 29.5 ML/M^2
BH CV ECHO MEAS - SV(AO): 56.8 ML
BH CV ECHO MEAS - SV(CUBED): 73.6 ML
BH CV ECHO MEAS - SV(LVOT): 30.1 ML
BH CV ECHO MEAS - SV(MOD-SP4): 70 ML
BH CV ECHO MEAS - SV(TEICH): 55.2 ML
BH CV ECHO MEAS - TV MAX PG: 1.6 MMHG
BH CV ECHO MEAS - TV V2 MAX: 62.6 CM/SEC
BH CV ECHO MEASUREMENTS AVERAGE E/E' RATIO: 10.95
LEFT ATRIUM VOLUME INDEX: 43.8 ML/M^2
LEFT ATRIUM VOLUME: 82 ML
LV EF 2D ECHO EST: 32 %

## 2021-09-07 ENCOUNTER — TELEPHONE (OUTPATIENT)
Dept: CARDIOLOGY | Facility: CLINIC | Age: 61
End: 2021-09-07

## 2021-09-07 ENCOUNTER — NURSE TRIAGE (OUTPATIENT)
Dept: CALL CENTER | Facility: HOSPITAL | Age: 61
End: 2021-09-07

## 2021-09-07 DIAGNOSIS — R06.02 SOB (SHORTNESS OF BREATH): ICD-10-CM

## 2021-09-07 DIAGNOSIS — R79.89 ELEVATED BRAIN NATRIURETIC PEPTIDE (BNP) LEVEL: Primary | ICD-10-CM

## 2021-09-07 RX ORDER — FUROSEMIDE 40 MG/1
TABLET ORAL
Qty: 9 TABLET | Refills: 0 | Status: ON HOLD | OUTPATIENT
Start: 2021-09-07 | End: 2021-09-16

## 2021-09-07 RX ORDER — POTASSIUM CHLORIDE 750 MG/1
10 TABLET, FILM COATED, EXTENDED RELEASE ORAL DAILY
Qty: 3 TABLET | Refills: 0 | Status: SHIPPED | OUTPATIENT
Start: 2021-09-07 | End: 2021-09-10 | Stop reason: DRUGHIGH

## 2021-09-07 NOTE — TELEPHONE ENCOUNTER
Please let the patient know I have called in 3 more days of Lasix as well as a reduced dose of potassium.  Ideally, he will come and get labs drawn on Thursday so Dr. Meyers can have them when he presents for his follow-up appointment on Friday.  This will let us know if he is improving with diuresis and also how well he is tolerating this therapy.  Thanks.

## 2021-09-07 NOTE — TELEPHONE ENCOUNTER
"    Reason for Disposition  • [1] Continuous (nonstop) coughing AND [2] keeps from working or sleeping    Additional Information  • Negative: SEVERE difficulty breathing (e.g., struggling for each breath, speaks in single words)  • Negative: [1] Breathing stopped AND [2] hasn't returned  • Negative: Choking on something  • Negative: Bluish (or gray) lips or face now  • Negative: Difficult to awaken or acting confused (e.g., disoriented, slurred speech)  • Negative: Passed out (i.e., lost consciousness, collapsed and was not responding)  • Negative: Wheezing started suddenly after medicine, an allergic food or bee sting  • Negative: Stridor  • Negative: Slow, shallow and weak breathing  • Negative: Sounds like a life-threatening emergency to the triager  • Negative: Chest pain  • Negative: [1] Wheezing (high pitched whistling sound) AND [2] previous asthma attacks or use of asthma medicines  • Negative: [1] Difficulty breathing AND [2] only present when coughing  • Negative: [1] Difficulty breathing AND [2] only from stuffy or runny nose  • Negative: [1] Difficulty breathing AND [2] within 14 days of COVID-19 Exposure  • Negative: [1] MODERATE difficulty breathing (e.g., speaks in phrases, SOB even at rest, pulse 100-120) AND [2] NEW-onset or WORSE than normal  • Negative: Wheezing can be heard across the room  • Negative: Drooling or spitting out saliva (because can't swallow)  • Negative: History of prior \"blood clot\" in leg or lungs (i.e., deep vein thrombosis, pulmonary embolism)  • Negative: History of inherited increased risk of blood clots (e.g., Factor 5 Leiden, Anti-thrombin 3, Protein C or Protein S deficiency, Prothrombin mutation)  • Negative: Major surgery in the past month  • Negative: Hip or leg fracture (broken bone) in past month (or had cast on leg or ankle in past month)  • Negative: Illness requiring prolonged bedrest in past month (e.g., immobilization, long hospital stay)  • Negative: " "Long-distance travel in past month (e.g., car, bus, train, plane; with trip lasting 6 or more hours)  • Negative: Cancer treatment in past six months (or has cancer now)  • Negative: Extra heart beats OR irregular heart beating   (i.e., \"palpitations\")  • Negative: Fever > 103 F (39.4 C)  • Negative: [1] Fever > 101 F (38.3 C) AND [2] age > 60 years  • Negative: [1] Fever > 100.0 F (37.8 C) AND [2] bedridden (e.g., nursing home patient, CVA, chronic illness, recovering from surgery)  • Negative: [1] Fever > 100.0 F (37.8 C) AND [2] diabetes mellitus or weak immune system (e.g., HIV positive, cancer chemo, splenectomy, organ transplant, chronic steroids)  • Negative: [1] Periods where breathing stops and then resumes normally AND [2] bedridden (e.g., nursing home patient, CVA)  • Negative: Pregnant or postpartum (from 0 to 6 weeks after delivery)  • Negative: Patient sounds very sick or weak to the triager  • Negative: [1] MILD difficulty breathing (e.g., minimal/no SOB at rest, SOB with walking, pulse <100) AND [2] NEW-onset or WORSE than normal  • Negative: [1] Longstanding difficulty breathing (e.g., CHF, COPD, emphysema) AND [2] WORSE than normal  • Negative: [1] Longstanding difficulty breathing AND [2] not responding to usual therapy    Answer Assessment - Initial Assessment Questions  1. RESPIRATORY STATUS: \"Describe your breathing?\" (e.g., wheezing, shortness of breath, unable to speak, severe coughing)       Coughing , coughing up phlegm  2. ONSET: \"When did this breathing problem begin?\"       2 to 3 weeks  3. PATTERN \"Does the difficult breathing come and go, or has it been constant since it started?\"       Comes and goes  4. SEVERITY: \"How bad is your breathing?\" (e.g., mild, moderate, severe)     - MILD: No SOB at rest, mild SOB with walking, speaks normally in sentences, can lay down, no retractions, pulse < 100.     - MODERATE: SOB at rest, SOB with minimal exertion and prefers to sit, cannot lie down " "flat, speaks in phrases, mild retractions, audible wheezing, pulse 100-120.     - SEVERE: Very SOB at rest, speaks in single words, struggling to breathe, sitting hunched forward, retractions, pulse > 120       moderate  5. RECURRENT SYMPTOM: \"Have you had difficulty breathing before?\" If Yes, ask: \"When was the last time?\" and \"What happened that time?\"       Yes   6. CARDIAC HISTORY: \"Do you have any history of heart disease?\" (e.g., heart attack, angina, bypass surgery, angioplasty)       Has been told has CHF, BNP was over 6,000 in ER.  Wife states he gets medication and feels better and then wont follow up  7. LUNG HISTORY: \"Do you have any history of lung disease?\"  (e.g., pulmonary embolus, asthma, emphysema)      no  8. CAUSE: \"What do you think is causing the breathing problem?\"       CHF  9. OTHER SYMPTOMS: \"Do you have any other symptoms? (e.g., dizziness, runny nose, cough, chest pain, fever)      Abdominal edema  10. PREGNANCY: \"Is there any chance you are pregnant?\" \"When was your last menstrual period?\"        NA  11. TRAVEL: \"Have you traveled out of the country in the last month?\" (e.g., travel history, exposures)        no    Protocols used: BREATHING DIFFICULTY-ADULT-AH      "

## 2021-09-07 NOTE — TELEPHONE ENCOUNTER
Pt called stating that he is out of his Fluid Pill and states that he has started getting SOA again and is gradually getting worse. Pt has an appointment on Friday 9/10. Please advise

## 2021-09-09 ENCOUNTER — TELEPHONE (OUTPATIENT)
Dept: CARDIOLOGY | Facility: CLINIC | Age: 61
End: 2021-09-09

## 2021-09-09 ENCOUNTER — LAB (OUTPATIENT)
Dept: LAB | Facility: HOSPITAL | Age: 61
End: 2021-09-09

## 2021-09-09 DIAGNOSIS — R79.89 ELEVATED BRAIN NATRIURETIC PEPTIDE (BNP) LEVEL: ICD-10-CM

## 2021-09-09 DIAGNOSIS — R06.02 SOB (SHORTNESS OF BREATH): ICD-10-CM

## 2021-09-09 DIAGNOSIS — T50.2X5A DIURETIC-INDUCED HYPOKALEMIA: Primary | ICD-10-CM

## 2021-09-09 DIAGNOSIS — E87.6 DIURETIC-INDUCED HYPOKALEMIA: Primary | ICD-10-CM

## 2021-09-09 LAB
ANION GAP SERPL CALCULATED.3IONS-SCNC: 8.3 MMOL/L (ref 5–15)
BUN SERPL-MCNC: 17 MG/DL (ref 8–23)
BUN/CREAT SERPL: 14.4 (ref 7–25)
CALCIUM SPEC-SCNC: 9.5 MG/DL (ref 8.6–10.5)
CHLORIDE SERPL-SCNC: 97 MMOL/L (ref 98–107)
CO2 SERPL-SCNC: 31.7 MMOL/L (ref 22–29)
CREAT SERPL-MCNC: 1.18 MG/DL (ref 0.76–1.27)
GFR SERPL CREATININE-BSD FRML MDRD: 63 ML/MIN/1.73
GLUCOSE SERPL-MCNC: 141 MG/DL (ref 65–99)
NT-PROBNP SERPL-MCNC: 4676 PG/ML (ref 0–900)
POTASSIUM SERPL-SCNC: 3.2 MMOL/L (ref 3.5–5.2)
SODIUM SERPL-SCNC: 137 MMOL/L (ref 136–145)

## 2021-09-09 PROCEDURE — 80048 BASIC METABOLIC PNL TOTAL CA: CPT

## 2021-09-09 PROCEDURE — 83880 ASSAY OF NATRIURETIC PEPTIDE: CPT

## 2021-09-09 PROCEDURE — 36415 COLL VENOUS BLD VENIPUNCTURE: CPT

## 2021-09-09 RX ORDER — POTASSIUM CHLORIDE 20 MEQ/1
40 TABLET, EXTENDED RELEASE ORAL DAILY
Qty: 2 TABLET | Refills: 0 | Status: ON HOLD | OUTPATIENT
Start: 2021-09-09 | End: 2021-09-16

## 2021-09-09 NOTE — TELEPHONE ENCOUNTER
Please let the patient know his labs (and echocardiogram result) continue to show that he is heart failure.  It is likely that he will be started on some new medications for this tomorrow when he sees Dr. Meyers.  I have also printed some information regarding heart failure and will be giving this to Aida so she can pass this off to the patient tomorrow.    In the meantime, his potassium is low.  He needs to take 40 meq (x's 1 dose) now.  This has been sent to his pharmacy.

## 2021-09-10 ENCOUNTER — OFFICE VISIT (OUTPATIENT)
Dept: CARDIOLOGY | Facility: CLINIC | Age: 61
End: 2021-09-10

## 2021-09-10 ENCOUNTER — HOSPITAL ENCOUNTER (OUTPATIENT)
Facility: HOSPITAL | Age: 61
Setting detail: HOSPITAL OUTPATIENT SURGERY
End: 2021-09-10
Attending: INTERNAL MEDICINE | Admitting: INTERNAL MEDICINE

## 2021-09-10 VITALS
HEIGHT: 68 IN | OXYGEN SATURATION: 97 % | SYSTOLIC BLOOD PRESSURE: 120 MMHG | HEART RATE: 91 BPM | DIASTOLIC BLOOD PRESSURE: 76 MMHG | WEIGHT: 157 LBS | BODY MASS INDEX: 23.79 KG/M2

## 2021-09-10 DIAGNOSIS — I50.22 CHRONIC SYSTOLIC CONGESTIVE HEART FAILURE (HCC): ICD-10-CM

## 2021-09-10 DIAGNOSIS — I50.22 CHRONIC SYSTOLIC CONGESTIVE HEART FAILURE (HCC): Primary | ICD-10-CM

## 2021-09-10 DIAGNOSIS — Z72.0 TOBACCO ABUSE: ICD-10-CM

## 2021-09-10 DIAGNOSIS — I10 ESSENTIAL HYPERTENSION: ICD-10-CM

## 2021-09-10 DIAGNOSIS — J43.2 CENTRILOBULAR EMPHYSEMA (HCC): ICD-10-CM

## 2021-09-10 PROCEDURE — 99214 OFFICE O/P EST MOD 30 MIN: CPT | Performed by: INTERNAL MEDICINE

## 2021-09-10 RX ORDER — SACUBITRIL AND VALSARTAN 24; 26 MG/1; MG/1
1 TABLET, FILM COATED ORAL 2 TIMES DAILY
Qty: 60 TABLET | Refills: 11 | Status: SHIPPED | OUTPATIENT
Start: 2021-09-10 | End: 2021-12-01

## 2021-09-10 RX ORDER — VARENICLINE TARTRATE 1 MG/1
1 TABLET, FILM COATED ORAL 2 TIMES DAILY
Qty: 56 TABLET | Refills: 1 | Status: SHIPPED | OUTPATIENT
Start: 2021-10-08 | End: 2021-09-21

## 2021-09-10 RX ORDER — HYDROCODONE BITARTRATE AND ACETAMINOPHEN 5; 325 MG/1; MG/1
1 TABLET ORAL EVERY 4 HOURS PRN
Status: CANCELLED | OUTPATIENT
Start: 2021-09-10 | End: 2021-09-20

## 2021-09-10 RX ORDER — DIAZEPAM 2 MG/1
4 TABLET ORAL ONCE
Status: CANCELLED | OUTPATIENT
Start: 2021-09-10 | End: 2021-09-10

## 2021-09-10 RX ORDER — ONDANSETRON 2 MG/ML
4 INJECTION INTRAMUSCULAR; INTRAVENOUS EVERY 6 HOURS PRN
Status: CANCELLED | OUTPATIENT
Start: 2021-09-10

## 2021-09-10 NOTE — PROGRESS NOTES
Subjective:     Encounter Date:09/10/2021      Patient ID: Tristan Castle is a 61 y.o. male.    Chief Complaint: Congestive heart failure  HPI  This is a 61-year-old male patient who presents to cardiology clinic for evaluation of progressively worsening shortness of breath with activity.  The patient underwent an echocardiogram showing an ejection fraction of 30%.  He was previously scheduled to have an outpatient vasodilator nuclear stress test.  The patient reports his symptoms have remained unchanged in regards to frequency, duration and intensity compared to his initial clinic evaluation.  He continues to smoke daily and requests assistance in discontinuation.  He reports compliance with his medications with no perceived side effects.  The following portions of the patient's history were reviewed and updated as appropriate: allergies, current medications, past family history, past medical history, past social history, past surgical history and problem  Review of Systems   Constitutional: Negative for chills, diaphoresis, fever, malaise/fatigue, weight gain and weight loss.   HENT: Negative for ear discharge, hearing loss, hoarse voice and nosebleeds.    Eyes: Negative for discharge, double vision, pain and photophobia.   Cardiovascular: Positive for dyspnea on exertion. Negative for chest pain, claudication, cyanosis, irregular heartbeat, leg swelling, near-syncope, orthopnea, palpitations, paroxysmal nocturnal dyspnea and syncope.   Respiratory: Positive for shortness of breath. Negative for cough, hemoptysis, sputum production and wheezing.    Endocrine: Negative for cold intolerance, heat intolerance, polydipsia, polyphagia and polyuria.   Hematologic/Lymphatic: Negative for adenopathy and bleeding problem. Does not bruise/bleed easily.   Skin: Negative for color change, flushing, itching and rash.   Musculoskeletal: Negative for muscle cramps, muscle weakness, myalgias and stiffness.   Gastrointestinal:  Negative for abdominal pain, diarrhea, hematemesis, hematochezia, nausea and vomiting.   Genitourinary: Negative for dysuria, frequency and nocturia.   Neurological: Negative for focal weakness, loss of balance, numbness, paresthesias and seizures.   Psychiatric/Behavioral: Negative for altered mental status, hallucinations and suicidal ideas.   Allergic/Immunologic: Negative for HIV exposure, hives and persistent infections.           Current Outpatient Medications:   •  carvedilol (COREG) 3.125 MG tablet, Take 1 tablet by mouth 2 (Two) Times a Day., Disp: 60 tablet, Rfl: 11  •  furosemide (LASIX) 40 MG tablet, Take 2 tablets by mouth in the morning and 1 tablet in the afternoon x's 3 days, Disp: 9 tablet, Rfl: 0  •  potassium chloride (K-DUR,KLOR-CON) 20 MEQ CR tablet, Take 2 tablets by mouth Daily., Disp: 2 tablet, Rfl: 0  •  sacubitril-valsartan (Entresto) 24-26 MG tablet, Take 1 tablet by mouth 2 (Two) Times a Day., Disp: 60 tablet, Rfl: 11    Objective:   Vitals and nursing note reviewed.   Constitutional:       Appearance: Healthy appearance. Not in distress.   Neck:      Vascular: No JVR. JVD normal.   Pulmonary:      Effort: Pulmonary effort is normal.      Breath sounds: Normal breath sounds. No wheezing. No rhonchi. No rales.   Chest:      Chest wall: Not tender to palpatation.   Cardiovascular:      PMI at left midclavicular line. Normal rate. Regular rhythm. Normal S1. Normal S2.      Murmurs: There is no murmur.      No gallop. No click. No rub.   Pulses:     Intact distal pulses.   Edema:     Peripheral edema absent.   Abdominal:      General: Bowel sounds are normal.      Palpations: Abdomen is soft.      Tenderness: There is no abdominal tenderness.   Musculoskeletal: Normal range of motion.         General: No tenderness. Skin:     General: Skin is warm and dry.   Neurological:      General: No focal deficit present.      Mental Status: Alert and oriented to person, place and time.       Blood  "pressure 120/76, pulse 91, height 172.7 cm (68\"), weight 71.2 kg (157 lb), SpO2 97 %.   Lab Review:     Assessment:       1. Essential hypertension  Acceptable blood pressure control.    2. Centrilobular emphysema (CMS/HCC)  Typical tobacco related lung disease    3. Tobacco abuse  Ongoing daily tobacco abuse.  Patient requests assistance in discontinuation of smoking.    4. Chronic systolic congestive heart failure (CMS/HCC)  Heart failure with reduced ejection fraction.  Stage C.  New York Heart Association functional class IV symptoms.  Euvolemic.    Procedures    Plan:     I have recommended starting Entresto 24-26 mg orally twice daily.  I have recommended his Lasix to be used only as needed at this point.  I have recommended Chantix.  I have recommended coronary angiography with interventional standby.  Mr. Castle has been engaged in a patient level discussion explaining the rationale for proceeding with invasive testing/procedures.  The procedure of coronary angiography with catheter-based coronary revascularization has been explained in detail to the patient, using layman's terminology, including risks benefits and alternatives.  He expresses understanding and a desire to proceed.  Further recommendations will be predicated on the results of his catheterization findings.  The patient will require LifeVest.      "

## 2021-09-15 ENCOUNTER — APPOINTMENT (OUTPATIENT)
Dept: GENERAL RADIOLOGY | Facility: HOSPITAL | Age: 61
End: 2021-09-15

## 2021-09-15 ENCOUNTER — HOSPITAL ENCOUNTER (INPATIENT)
Facility: HOSPITAL | Age: 61
LOS: 2 days | Discharge: LEFT BEFORE DISCHARGE | End: 2021-09-17
Attending: EMERGENCY MEDICINE | Admitting: STUDENT IN AN ORGANIZED HEALTH CARE EDUCATION/TRAINING PROGRAM

## 2021-09-15 ENCOUNTER — APPOINTMENT (OUTPATIENT)
Dept: CT IMAGING | Facility: HOSPITAL | Age: 61
End: 2021-09-15

## 2021-09-15 DIAGNOSIS — I50.22 CHRONIC SYSTOLIC CONGESTIVE HEART FAILURE (HCC): ICD-10-CM

## 2021-09-15 DIAGNOSIS — I21.4 NON-STEMI (NON-ST ELEVATED MYOCARDIAL INFARCTION) (HCC): Primary | ICD-10-CM

## 2021-09-15 LAB
ALBUMIN SERPL-MCNC: 4.1 G/DL (ref 3.5–5.2)
ALBUMIN/GLOB SERPL: 1.2 G/DL
ALP SERPL-CCNC: 80 U/L (ref 39–117)
ALT SERPL W P-5'-P-CCNC: 36 U/L (ref 1–41)
ANION GAP SERPL CALCULATED.3IONS-SCNC: 9 MMOL/L (ref 5–15)
AST SERPL-CCNC: 50 U/L (ref 1–40)
BASOPHILS # BLD AUTO: 0.06 10*3/MM3 (ref 0–0.2)
BASOPHILS NFR BLD AUTO: 0.6 % (ref 0–1.5)
BILIRUB SERPL-MCNC: 0.3 MG/DL (ref 0–1.2)
BUN SERPL-MCNC: 14 MG/DL (ref 8–23)
BUN/CREAT SERPL: 12.7 (ref 7–25)
CALCIUM SPEC-SCNC: 9.5 MG/DL (ref 8.6–10.5)
CHLORIDE SERPL-SCNC: 102 MMOL/L (ref 98–107)
CO2 SERPL-SCNC: 27 MMOL/L (ref 22–29)
CREAT SERPL-MCNC: 1.1 MG/DL (ref 0.76–1.27)
D DIMER PPP FEU-MCNC: 1.36 MCGFEU/ML (ref 0–0.57)
DEPRECATED RDW RBC AUTO: 41.7 FL (ref 37–54)
EOSINOPHIL # BLD AUTO: 0.16 10*3/MM3 (ref 0–0.4)
EOSINOPHIL NFR BLD AUTO: 1.6 % (ref 0.3–6.2)
ERYTHROCYTE [DISTWIDTH] IN BLOOD BY AUTOMATED COUNT: 12.8 % (ref 12.3–15.4)
GFR SERPL CREATININE-BSD FRML MDRD: 68 ML/MIN/1.73
GLOBULIN UR ELPH-MCNC: 3.3 GM/DL
GLUCOSE SERPL-MCNC: 150 MG/DL (ref 65–99)
HCT VFR BLD AUTO: 45 % (ref 37.5–51)
HGB BLD-MCNC: 14.7 G/DL (ref 13–17.7)
HOLD SPECIMEN: NORMAL
HOLD SPECIMEN: NORMAL
IMM GRANULOCYTES # BLD AUTO: 0.02 10*3/MM3 (ref 0–0.05)
IMM GRANULOCYTES NFR BLD AUTO: 0.2 % (ref 0–0.5)
LYMPHOCYTES # BLD AUTO: 3.35 10*3/MM3 (ref 0.7–3.1)
LYMPHOCYTES NFR BLD AUTO: 32.6 % (ref 19.6–45.3)
MCH RBC QN AUTO: 28.9 PG (ref 26.6–33)
MCHC RBC AUTO-ENTMCNC: 32.7 G/DL (ref 31.5–35.7)
MCV RBC AUTO: 88.6 FL (ref 79–97)
MONOCYTES # BLD AUTO: 0.84 10*3/MM3 (ref 0.1–0.9)
MONOCYTES NFR BLD AUTO: 8.2 % (ref 5–12)
NEUTROPHILS NFR BLD AUTO: 5.85 10*3/MM3 (ref 1.7–7)
NEUTROPHILS NFR BLD AUTO: 56.8 % (ref 42.7–76)
NRBC BLD AUTO-RTO: 0 /100 WBC (ref 0–0.2)
PLATELET # BLD AUTO: 315 10*3/MM3 (ref 140–450)
PMV BLD AUTO: 10.1 FL (ref 6–12)
POTASSIUM SERPL-SCNC: 4.6 MMOL/L (ref 3.5–5.2)
PROT SERPL-MCNC: 7.4 G/DL (ref 6–8.5)
RBC # BLD AUTO: 5.08 10*6/MM3 (ref 4.14–5.8)
SARS-COV-2 RNA PNL SPEC NAA+PROBE: NOT DETECTED
SODIUM SERPL-SCNC: 138 MMOL/L (ref 136–145)
TROPONIN T SERPL-MCNC: 0.12 NG/ML (ref 0–0.03)
TROPONIN T SERPL-MCNC: 0.13 NG/ML (ref 0–0.03)
WBC # BLD AUTO: 10.28 10*3/MM3 (ref 3.4–10.8)
WHOLE BLOOD HOLD SPECIMEN: NORMAL
WHOLE BLOOD HOLD SPECIMEN: NORMAL

## 2021-09-15 PROCEDURE — 99284 EMERGENCY DEPT VISIT MOD MDM: CPT

## 2021-09-15 PROCEDURE — 25010000002 DIPHENHYDRAMINE PER 50 MG: Performed by: EMERGENCY MEDICINE

## 2021-09-15 PROCEDURE — 80053 COMPREHEN METABOLIC PANEL: CPT | Performed by: EMERGENCY MEDICINE

## 2021-09-15 PROCEDURE — 25010000002 DROPERIDOL PER 5 MG: Performed by: EMERGENCY MEDICINE

## 2021-09-15 PROCEDURE — 87635 SARS-COV-2 COVID-19 AMP PRB: CPT | Performed by: EMERGENCY MEDICINE

## 2021-09-15 PROCEDURE — 71045 X-RAY EXAM CHEST 1 VIEW: CPT

## 2021-09-15 PROCEDURE — 25010000002 ONDANSETRON PER 1 MG: Performed by: EMERGENCY MEDICINE

## 2021-09-15 PROCEDURE — 84484 ASSAY OF TROPONIN QUANT: CPT | Performed by: EMERGENCY MEDICINE

## 2021-09-15 PROCEDURE — 93005 ELECTROCARDIOGRAM TRACING: CPT | Performed by: EMERGENCY MEDICINE

## 2021-09-15 PROCEDURE — 25010000002 MORPHINE PER 10 MG: Performed by: EMERGENCY MEDICINE

## 2021-09-15 PROCEDURE — 85379 FIBRIN DEGRADATION QUANT: CPT | Performed by: EMERGENCY MEDICINE

## 2021-09-15 PROCEDURE — 71275 CT ANGIOGRAPHY CHEST: CPT

## 2021-09-15 PROCEDURE — 99222 1ST HOSP IP/OBS MODERATE 55: CPT | Performed by: INTERNAL MEDICINE

## 2021-09-15 PROCEDURE — 85025 COMPLETE CBC W/AUTO DIFF WBC: CPT | Performed by: EMERGENCY MEDICINE

## 2021-09-15 PROCEDURE — 25010000002 ENOXAPARIN PER 10 MG: Performed by: EMERGENCY MEDICINE

## 2021-09-15 PROCEDURE — 25010000002 IOPAMIDOL 61 % SOLUTION: Performed by: EMERGENCY MEDICINE

## 2021-09-15 RX ORDER — ONDANSETRON 2 MG/ML
4 INJECTION INTRAMUSCULAR; INTRAVENOUS ONCE
Status: COMPLETED | OUTPATIENT
Start: 2021-09-15 | End: 2021-09-15

## 2021-09-15 RX ORDER — ASPIRIN 325 MG
325 TABLET ORAL ONCE
Status: COMPLETED | OUTPATIENT
Start: 2021-09-15 | End: 2021-09-15

## 2021-09-15 RX ORDER — DROPERIDOL 2.5 MG/ML
1.25 INJECTION, SOLUTION INTRAMUSCULAR; INTRAVENOUS ONCE
Status: COMPLETED | OUTPATIENT
Start: 2021-09-15 | End: 2021-09-15

## 2021-09-15 RX ORDER — DIPHENHYDRAMINE HYDROCHLORIDE 50 MG/ML
25 INJECTION INTRAMUSCULAR; INTRAVENOUS ONCE
Status: COMPLETED | OUTPATIENT
Start: 2021-09-15 | End: 2021-09-15

## 2021-09-15 RX ORDER — SODIUM CHLORIDE 0.9 % (FLUSH) 0.9 %
10 SYRINGE (ML) INJECTION AS NEEDED
Status: DISCONTINUED | OUTPATIENT
Start: 2021-09-15 | End: 2021-09-17 | Stop reason: HOSPADM

## 2021-09-15 RX ORDER — MORPHINE SULFATE 4 MG/ML
4 INJECTION, SOLUTION INTRAMUSCULAR; INTRAVENOUS ONCE
Status: COMPLETED | OUTPATIENT
Start: 2021-09-15 | End: 2021-09-15

## 2021-09-15 RX ADMIN — IOPAMIDOL 100 ML: 612 INJECTION, SOLUTION INTRAVENOUS at 22:04

## 2021-09-15 RX ADMIN — ASPIRIN 325 MG ORAL TABLET 325 MG: 325 PILL ORAL at 18:23

## 2021-09-15 RX ADMIN — ONDANSETRON 4 MG: 2 INJECTION INTRAMUSCULAR; INTRAVENOUS at 18:26

## 2021-09-15 RX ADMIN — DIPHENHYDRAMINE HYDROCHLORIDE 25 MG: 50 INJECTION INTRAMUSCULAR; INTRAVENOUS at 18:56

## 2021-09-15 RX ADMIN — MORPHINE SULFATE 4 MG: 4 INJECTION, SOLUTION INTRAMUSCULAR; INTRAVENOUS at 18:27

## 2021-09-15 RX ADMIN — DROPERIDOL 1.25 MG: 2.5 INJECTION, SOLUTION INTRAMUSCULAR; INTRAVENOUS at 18:56

## 2021-09-15 RX ADMIN — ENOXAPARIN SODIUM 70 MG: 80 INJECTION SUBCUTANEOUS at 23:12
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
## 2021-09-16 ENCOUNTER — APPOINTMENT (OUTPATIENT)
Dept: NUCLEAR MEDICINE | Facility: HOSPITAL | Age: 61
End: 2021-09-16

## 2021-09-16 LAB
ANION GAP SERPL CALCULATED.3IONS-SCNC: 8.4 MMOL/L (ref 5–15)
ANION GAP SERPL CALCULATED.3IONS-SCNC: 8.5 MMOL/L (ref 5–15)
BASOPHILS # BLD AUTO: 0.05 10*3/MM3 (ref 0–0.2)
BASOPHILS NFR BLD AUTO: 0.5 % (ref 0–1.5)
BUN SERPL-MCNC: 13 MG/DL (ref 8–23)
BUN SERPL-MCNC: 14 MG/DL (ref 8–23)
BUN/CREAT SERPL: 13.7 (ref 7–25)
BUN/CREAT SERPL: 15.7 (ref 7–25)
CALCIUM SPEC-SCNC: 9 MG/DL (ref 8.6–10.5)
CALCIUM SPEC-SCNC: 9 MG/DL (ref 8.6–10.5)
CHLORIDE SERPL-SCNC: 105 MMOL/L (ref 98–107)
CHLORIDE SERPL-SCNC: 105 MMOL/L (ref 98–107)
CHOLEST SERPL-MCNC: 142 MG/DL (ref 0–200)
CO2 SERPL-SCNC: 24.6 MMOL/L (ref 22–29)
CO2 SERPL-SCNC: 25.5 MMOL/L (ref 22–29)
CREAT SERPL-MCNC: 0.89 MG/DL (ref 0.76–1.27)
CREAT SERPL-MCNC: 0.95 MG/DL (ref 0.76–1.27)
DEPRECATED RDW RBC AUTO: 41.6 FL (ref 37–54)
DEPRECATED RDW RBC AUTO: 41.7 FL (ref 37–54)
EOSINOPHIL # BLD AUTO: 0.09 10*3/MM3 (ref 0–0.4)
EOSINOPHIL NFR BLD AUTO: 0.8 % (ref 0.3–6.2)
ERYTHROCYTE [DISTWIDTH] IN BLOOD BY AUTOMATED COUNT: 12.7 % (ref 12.3–15.4)
ERYTHROCYTE [DISTWIDTH] IN BLOOD BY AUTOMATED COUNT: 12.7 % (ref 12.3–15.4)
GFR SERPL CREATININE-BSD FRML MDRD: 81 ML/MIN/1.73
GFR SERPL CREATININE-BSD FRML MDRD: 87 ML/MIN/1.73
GLUCOSE SERPL-MCNC: 123 MG/DL (ref 65–99)
GLUCOSE SERPL-MCNC: 128 MG/DL (ref 65–99)
HBA1C MFR BLD: 6.4 % (ref 4.8–5.6)
HCT VFR BLD AUTO: 42.1 % (ref 37.5–51)
HCT VFR BLD AUTO: 42.8 % (ref 37.5–51)
HDLC SERPL-MCNC: 47 MG/DL (ref 40–60)
HGB BLD-MCNC: 13.8 G/DL (ref 13–17.7)
HGB BLD-MCNC: 13.8 G/DL (ref 13–17.7)
IMM GRANULOCYTES # BLD AUTO: 0.05 10*3/MM3 (ref 0–0.05)
IMM GRANULOCYTES NFR BLD AUTO: 0.5 % (ref 0–0.5)
LDLC SERPL CALC-MCNC: 79 MG/DL (ref 0–100)
LDLC/HDLC SERPL: 1.66 {RATIO}
LYMPHOCYTES # BLD AUTO: 1.92 10*3/MM3 (ref 0.7–3.1)
LYMPHOCYTES NFR BLD AUTO: 17.3 % (ref 19.6–45.3)
MCH RBC QN AUTO: 28.6 PG (ref 26.6–33)
MCH RBC QN AUTO: 29.2 PG (ref 26.6–33)
MCHC RBC AUTO-ENTMCNC: 32.2 G/DL (ref 31.5–35.7)
MCHC RBC AUTO-ENTMCNC: 32.8 G/DL (ref 31.5–35.7)
MCV RBC AUTO: 88.8 FL (ref 79–97)
MCV RBC AUTO: 89.2 FL (ref 79–97)
MONOCYTES # BLD AUTO: 0.77 10*3/MM3 (ref 0.1–0.9)
MONOCYTES NFR BLD AUTO: 6.9 % (ref 5–12)
NEUTROPHILS NFR BLD AUTO: 74 % (ref 42.7–76)
NEUTROPHILS NFR BLD AUTO: 8.21 10*3/MM3 (ref 1.7–7)
NRBC BLD AUTO-RTO: 0 /100 WBC (ref 0–0.2)
PLATELET # BLD AUTO: 271 10*3/MM3 (ref 140–450)
PLATELET # BLD AUTO: 272 10*3/MM3 (ref 140–450)
PMV BLD AUTO: 10 FL (ref 6–12)
PMV BLD AUTO: 9.3 FL (ref 6–12)
POTASSIUM SERPL-SCNC: 4.6 MMOL/L (ref 3.5–5.2)
POTASSIUM SERPL-SCNC: 4.7 MMOL/L (ref 3.5–5.2)
RBC # BLD AUTO: 4.72 10*6/MM3 (ref 4.14–5.8)
RBC # BLD AUTO: 4.82 10*6/MM3 (ref 4.14–5.8)
SODIUM SERPL-SCNC: 138 MMOL/L (ref 136–145)
SODIUM SERPL-SCNC: 139 MMOL/L (ref 136–145)
TRIGL SERPL-MCNC: 86 MG/DL (ref 0–150)
VLDLC SERPL-MCNC: 16 MG/DL (ref 5–40)
WBC # BLD AUTO: 10.7 10*3/MM3 (ref 3.4–10.8)
WBC # BLD AUTO: 11.09 10*3/MM3 (ref 3.4–10.8)

## 2021-09-16 PROCEDURE — C1887 CATHETER, GUIDING: HCPCS | Performed by: INTERNAL MEDICINE

## 2021-09-16 PROCEDURE — 99231 SBSQ HOSP IP/OBS SF/LOW 25: CPT | Performed by: STUDENT IN AN ORGANIZED HEALTH CARE EDUCATION/TRAINING PROGRAM

## 2021-09-16 PROCEDURE — 83036 HEMOGLOBIN GLYCOSYLATED A1C: CPT | Performed by: INTERNAL MEDICINE

## 2021-09-16 PROCEDURE — B2111ZZ FLUOROSCOPY OF MULTIPLE CORONARY ARTERIES USING LOW OSMOLAR CONTRAST: ICD-10-PCS | Performed by: INTERNAL MEDICINE

## 2021-09-16 PROCEDURE — 25010000002 HEPARIN (PORCINE) PER 1000 UNITS: Performed by: INTERNAL MEDICINE

## 2021-09-16 PROCEDURE — C1874 STENT, COATED/COV W/DEL SYS: HCPCS | Performed by: INTERNAL MEDICINE

## 2021-09-16 PROCEDURE — C1894 INTRO/SHEATH, NON-LASER: HCPCS | Performed by: INTERNAL MEDICINE

## 2021-09-16 PROCEDURE — 92928 PRQ TCAT PLMT NTRAC ST 1 LES: CPT | Performed by: INTERNAL MEDICINE

## 2021-09-16 PROCEDURE — 25010000002 MIDAZOLAM PER 1MG: Performed by: INTERNAL MEDICINE

## 2021-09-16 PROCEDURE — C9600 PERC DRUG-EL COR STENT SING: HCPCS | Performed by: INTERNAL MEDICINE

## 2021-09-16 PROCEDURE — 99222 1ST HOSP IP/OBS MODERATE 55: CPT | Performed by: INTERNAL MEDICINE

## 2021-09-16 PROCEDURE — 25010000003 LIDOCAINE 1 % SOLUTION: Performed by: INTERNAL MEDICINE

## 2021-09-16 PROCEDURE — 93005 ELECTROCARDIOGRAM TRACING: CPT | Performed by: INTERNAL MEDICINE

## 2021-09-16 PROCEDURE — 25010000002 FENTANYL CITRATE (PF) 50 MCG/ML SOLUTION: Performed by: INTERNAL MEDICINE

## 2021-09-16 PROCEDURE — 93454 CORONARY ARTERY ANGIO S&I: CPT | Performed by: INTERNAL MEDICINE

## 2021-09-16 PROCEDURE — C1769 GUIDE WIRE: HCPCS | Performed by: INTERNAL MEDICINE

## 2021-09-16 PROCEDURE — C1725 CATH, TRANSLUMIN NON-LASER: HCPCS | Performed by: INTERNAL MEDICINE

## 2021-09-16 PROCEDURE — 85027 COMPLETE CBC AUTOMATED: CPT | Performed by: INTERNAL MEDICINE

## 2021-09-16 PROCEDURE — 25010000002 ENOXAPARIN PER 10 MG: Performed by: INTERNAL MEDICINE

## 2021-09-16 PROCEDURE — 80048 BASIC METABOLIC PNL TOTAL CA: CPT | Performed by: INTERNAL MEDICINE

## 2021-09-16 PROCEDURE — 0 IOPAMIDOL PER 1 ML: Performed by: INTERNAL MEDICINE

## 2021-09-16 PROCEDURE — 80061 LIPID PANEL: CPT | Performed by: INTERNAL MEDICINE

## 2021-09-16 PROCEDURE — 85025 COMPLETE CBC W/AUTO DIFF WBC: CPT | Performed by: INTERNAL MEDICINE

## 2021-09-16 PROCEDURE — 027035Z DILATION OF CORONARY ARTERY, ONE ARTERY WITH TWO DRUG-ELUTING INTRALUMINAL DEVICES, PERCUTANEOUS APPROACH: ICD-10-PCS | Performed by: INTERNAL MEDICINE

## 2021-09-16 PROCEDURE — 4A023N7 MEASUREMENT OF CARDIAC SAMPLING AND PRESSURE, LEFT HEART, PERCUTANEOUS APPROACH: ICD-10-PCS | Performed by: INTERNAL MEDICINE

## 2021-09-16 DEVICE — XIENCE SIERRA™ EVEROLIMUS ELUTING CORONARY STENT SYSTEM 3.50 MM X 23 MM / RAPID-EXCHANGE
Type: IMPLANTABLE DEVICE | Site: CORONARY | Status: FUNCTIONAL
Brand: XIENCE SIERRA™

## 2021-09-16 DEVICE — XIENCE SIERRA™ EVEROLIMUS ELUTING CORONARY STENT SYSTEM 3.00 MM X 38 MM / RAPID-EXCHANGE
Type: IMPLANTABLE DEVICE | Site: CORONARY | Status: FUNCTIONAL
Brand: XIENCE SIERRA™

## 2021-09-16 RX ORDER — POLYETHYLENE GLYCOL 3350 17 G/17G
17 POWDER, FOR SOLUTION ORAL DAILY PRN
Status: DISCONTINUED | OUTPATIENT
Start: 2021-09-16 | End: 2021-09-17 | Stop reason: HOSPADM

## 2021-09-16 RX ORDER — NALOXONE HCL 0.4 MG/ML
0.4 VIAL (ML) INJECTION
Status: DISCONTINUED | OUTPATIENT
Start: 2021-09-16 | End: 2021-09-17 | Stop reason: HOSPADM

## 2021-09-16 RX ORDER — ATORVASTATIN CALCIUM 40 MG/1
80 TABLET, FILM COATED ORAL NIGHTLY
Status: DISCONTINUED | OUTPATIENT
Start: 2021-09-16 | End: 2021-09-17 | Stop reason: HOSPADM

## 2021-09-16 RX ORDER — LIDOCAINE HYDROCHLORIDE 10 MG/ML
INJECTION, SOLUTION INFILTRATION; PERINEURAL AS NEEDED
Status: DISCONTINUED | OUTPATIENT
Start: 2021-09-16 | End: 2021-09-16 | Stop reason: HOSPADM

## 2021-09-16 RX ORDER — ACETAMINOPHEN 650 MG/1
650 SUPPOSITORY RECTAL EVERY 4 HOURS PRN
Status: DISCONTINUED | OUTPATIENT
Start: 2021-09-16 | End: 2021-09-17 | Stop reason: HOSPADM

## 2021-09-16 RX ORDER — ONDANSETRON 4 MG/1
4 TABLET, FILM COATED ORAL EVERY 6 HOURS PRN
Status: DISCONTINUED | OUTPATIENT
Start: 2021-09-16 | End: 2021-09-17 | Stop reason: HOSPADM

## 2021-09-16 RX ORDER — MORPHINE SULFATE 2 MG/ML
4 INJECTION, SOLUTION INTRAMUSCULAR; INTRAVENOUS
Status: DISCONTINUED | OUTPATIENT
Start: 2021-09-16 | End: 2021-09-17 | Stop reason: HOSPADM

## 2021-09-16 RX ORDER — ALPRAZOLAM 0.5 MG/1
0.25 TABLET ORAL 3 TIMES DAILY PRN
Status: DISCONTINUED | OUTPATIENT
Start: 2021-09-16 | End: 2021-09-17 | Stop reason: HOSPADM

## 2021-09-16 RX ORDER — CARVEDILOL 3.12 MG/1
3.12 TABLET ORAL 2 TIMES DAILY
Status: DISCONTINUED | OUTPATIENT
Start: 2021-09-16 | End: 2021-09-17 | Stop reason: HOSPADM

## 2021-09-16 RX ORDER — ONDANSETRON 2 MG/ML
4 INJECTION INTRAMUSCULAR; INTRAVENOUS EVERY 6 HOURS PRN
Status: DISCONTINUED | OUTPATIENT
Start: 2021-09-16 | End: 2021-09-17 | Stop reason: HOSPADM

## 2021-09-16 RX ORDER — ACETAMINOPHEN 160 MG/5ML
650 SOLUTION ORAL EVERY 4 HOURS PRN
Status: DISCONTINUED | OUTPATIENT
Start: 2021-09-16 | End: 2021-09-17 | Stop reason: HOSPADM

## 2021-09-16 RX ORDER — BISACODYL 5 MG/1
5 TABLET, DELAYED RELEASE ORAL DAILY PRN
Status: DISCONTINUED | OUTPATIENT
Start: 2021-09-16 | End: 2021-09-17 | Stop reason: HOSPADM

## 2021-09-16 RX ORDER — ALPRAZOLAM 0.5 MG/1
1 TABLET ORAL NIGHTLY PRN
Status: DISCONTINUED | OUTPATIENT
Start: 2021-09-16 | End: 2021-09-17 | Stop reason: HOSPADM

## 2021-09-16 RX ORDER — DIPHENHYDRAMINE HYDROCHLORIDE 50 MG/ML
25 INJECTION INTRAMUSCULAR; INTRAVENOUS EVERY 6 HOURS PRN
Status: DISCONTINUED | OUTPATIENT
Start: 2021-09-16 | End: 2021-09-17 | Stop reason: HOSPADM

## 2021-09-16 RX ORDER — MIDAZOLAM HYDROCHLORIDE 2 MG/2ML
INJECTION, SOLUTION INTRAMUSCULAR; INTRAVENOUS AS NEEDED
Status: DISCONTINUED | OUTPATIENT
Start: 2021-09-16 | End: 2021-09-16 | Stop reason: HOSPADM

## 2021-09-16 RX ORDER — ASPIRIN 81 MG/1
81 TABLET, CHEWABLE ORAL DAILY
Status: DISCONTINUED | OUTPATIENT
Start: 2021-09-16 | End: 2021-09-17 | Stop reason: HOSPADM

## 2021-09-16 RX ORDER — DIAZEPAM 2 MG/1
4 TABLET ORAL ONCE
Status: COMPLETED | OUTPATIENT
Start: 2021-09-16 | End: 2021-09-16

## 2021-09-16 RX ORDER — SODIUM CHLORIDE 9 MG/ML
100 INJECTION, SOLUTION INTRAVENOUS CONTINUOUS
Status: ACTIVE | OUTPATIENT
Start: 2021-09-16 | End: 2021-09-16

## 2021-09-16 RX ORDER — ACETAMINOPHEN 325 MG/1
650 TABLET ORAL EVERY 4 HOURS PRN
Status: DISCONTINUED | OUTPATIENT
Start: 2021-09-16 | End: 2021-09-17 | Stop reason: HOSPADM

## 2021-09-16 RX ORDER — HYDROCODONE BITARTRATE AND ACETAMINOPHEN 5; 325 MG/1; MG/1
1 TABLET ORAL EVERY 4 HOURS PRN
Status: DISCONTINUED | OUTPATIENT
Start: 2021-09-16 | End: 2021-09-16 | Stop reason: HOSPADM

## 2021-09-16 RX ORDER — CHOLECALCIFEROL (VITAMIN D3) 125 MCG
5 CAPSULE ORAL NIGHTLY PRN
Status: DISCONTINUED | OUTPATIENT
Start: 2021-09-16 | End: 2021-09-17 | Stop reason: HOSPADM

## 2021-09-16 RX ORDER — HEPARIN SODIUM 1000 [USP'U]/ML
INJECTION, SOLUTION INTRAVENOUS; SUBCUTANEOUS AS NEEDED
Status: DISCONTINUED | OUTPATIENT
Start: 2021-09-16 | End: 2021-09-16 | Stop reason: HOSPADM

## 2021-09-16 RX ORDER — FENTANYL CITRATE 50 UG/ML
INJECTION, SOLUTION INTRAMUSCULAR; INTRAVENOUS AS NEEDED
Status: DISCONTINUED | OUTPATIENT
Start: 2021-09-16 | End: 2021-09-16 | Stop reason: HOSPADM

## 2021-09-16 RX ORDER — BISACODYL 10 MG
10 SUPPOSITORY, RECTAL RECTAL DAILY PRN
Status: DISCONTINUED | OUTPATIENT
Start: 2021-09-16 | End: 2021-09-17 | Stop reason: HOSPADM

## 2021-09-16 RX ORDER — ATORVASTATIN CALCIUM 40 MG/1
80 TABLET, FILM COATED ORAL NIGHTLY
Status: DISCONTINUED | OUTPATIENT
Start: 2021-09-16 | End: 2021-09-16 | Stop reason: SDUPTHER

## 2021-09-16 RX ORDER — HYDROCODONE BITARTRATE AND ACETAMINOPHEN 5; 325 MG/1; MG/1
1 TABLET ORAL EVERY 4 HOURS PRN
Status: DISCONTINUED | OUTPATIENT
Start: 2021-09-16 | End: 2021-09-17 | Stop reason: HOSPADM

## 2021-09-16 RX ORDER — MORPHINE SULFATE 2 MG/ML
2 INJECTION, SOLUTION INTRAMUSCULAR; INTRAVENOUS EVERY 4 HOURS PRN
Status: DISCONTINUED | OUTPATIENT
Start: 2021-09-16 | End: 2021-09-17 | Stop reason: HOSPADM

## 2021-09-16 RX ORDER — AMOXICILLIN 250 MG
2 CAPSULE ORAL 2 TIMES DAILY
Status: DISCONTINUED | OUTPATIENT
Start: 2021-09-16 | End: 2021-09-17 | Stop reason: HOSPADM

## 2021-09-16 RX ORDER — ONDANSETRON 2 MG/ML
4 INJECTION INTRAMUSCULAR; INTRAVENOUS EVERY 6 HOURS PRN
Status: DISCONTINUED | OUTPATIENT
Start: 2021-09-16 | End: 2021-09-16 | Stop reason: HOSPADM

## 2021-09-16 RX ORDER — TEMAZEPAM 15 MG/1
15 CAPSULE ORAL NIGHTLY PRN
Status: DISCONTINUED | OUTPATIENT
Start: 2021-09-16 | End: 2021-09-17 | Stop reason: HOSPADM

## 2021-09-16 RX ADMIN — CARVEDILOL 3.12 MG: 3.12 TABLET, FILM COATED ORAL at 10:28

## 2021-09-16 RX ADMIN — SACUBITRIL AND VALSARTAN 1 TABLET: 24; 26 TABLET, FILM COATED ORAL at 10:28

## 2021-09-16 RX ADMIN — ALPRAZOLAM 0.25 MG: 0.5 TABLET ORAL at 17:00

## 2021-09-16 RX ADMIN — HYDROCODONE BITARTRATE AND ACETAMINOPHEN 1 TABLET: 5; 325 TABLET ORAL at 21:37

## 2021-09-16 RX ADMIN — ENOXAPARIN SODIUM 70 MG: 80 INJECTION SUBCUTANEOUS at 17:30

## 2021-09-16 RX ADMIN — ATORVASTATIN CALCIUM 80 MG: 40 TABLET, FILM COATED ORAL at 01:42

## 2021-09-16 RX ADMIN — ATORVASTATIN CALCIUM 80 MG: 40 TABLET, FILM COATED ORAL at 21:37

## 2021-09-16 RX ADMIN — ASPIRIN 81 MG CHEWABLE TABLET 81 MG: 81 TABLET CHEWABLE at 17:30

## 2021-09-16 RX ADMIN — SACUBITRIL AND VALSARTAN 1 TABLET: 24; 26 TABLET, FILM COATED ORAL at 21:36

## 2021-09-16 RX ADMIN — ALPRAZOLAM 1 MG: 0.5 TABLET ORAL at 23:11

## 2021-09-16 RX ADMIN — TEMAZEPAM 15 MG: 15 CAPSULE ORAL at 21:37

## 2021-09-16 RX ADMIN — DIAZEPAM 4 MG: 2 TABLET ORAL at 12:39

## 2021-09-16 RX ADMIN — TICAGRELOR 90 MG: 90 TABLET ORAL at 21:37

## 2021-09-16 RX ADMIN — Medication 5 MG: at 21:38

## 2021-09-16 RX ADMIN — CARVEDILOL 3.12 MG: 3.12 TABLET, FILM COATED ORAL at 21:37

## 2021-09-16 NOTE — H&P
[facility]      Name:  Tristan Castle   Age:  61 y.o.  Sex:  male  :  1960  MRN:  [id number]   Visit Number:  [id number]  Admission Date:  9/15/2021  Date Of Service:  21  Primary Care Physician:  Marybel Fleming MD    Chief Complaint:     Chest pain    History Of Presenting Illness:      Patient is a very pleasant 61-year-old male with history significant for severe heart failure currently wearing LifeVest, hypertension, hyperlipidemia and COPD who presented to the hospital today with complaints of abrupt onset sharp stabbing left-sided chest pain.  Patient reports that he was asleep when the pain woke him.  Did not relieve until he came to the emergency room.  Patient also states that it was associated with shortness of breath.  Denies fever/chills, nausea/vomiting, diarrhea.  Patient follows with Dr. Meyers and was scheduled to undergo outpatient left heart catheterization in the morning.   In the ER, patient was hemodynamically stable 94% on room air.  Labs are significant for troponin of 0.116 elevated to 0.133.  D-dimer was 1.36, proBNP was 4676.  CTA was negative for pulmonary embolus.  Dr. Meyers was contacted and agreed to consult.  Hospital service contacted for admission.  Currently patient is chest pain-free.    Review Of Systems:    All systems were reviewed and negative except as mentioned in history of presenting illness, assessment and plan.    Past Medical History: Patient  has a past medical history of CHF (congestive heart failure) (CMS/[facility]), COPD (chronic obstructive pulmonary disease) (CMS/[facility]), Emphysema/COPD (CMS/[facility]), Hyperlipidemia, and Hypertension.    Past Surgical History: Patient  has a past surgical history that includes Appendectomy; Tennis Elbow Release (Left); and Ankle surgery (Right).    Social History: Patient  reports that he has been smoking cigarettes. He has a 12.50 pack-year smoking history. He has never used  smokeless tobacco. He reports previous alcohol use. He reports previous drug use.    Family History: Patient's family history is not on file.    Allergies:      Patient has no known allergies.    Home Medications:    Prior to Admission Medications     Prescriptions Last Dose Informant Patient Reported? Taking?    carvedilol (COREG) 3.125 MG tablet   No Yes    Take 1 tablet by mouth 2 (Two) Times a Day.    sacubitril-valsartan (Entresto) 24-26 MG tablet   No Yes    Take 1 tablet by mouth 2 (Two) Times a Day.    varenicline (Chantix Continuing Month Roland) 1 MG tablet   No Yes    Take 1 tablet by mouth 2 (Two) Times a Day for 56 days.    varenicline (CHANTIX ROLAND) 0.5 MG X 11 & 1 MG X 42 tablet   No Yes    Take 0.5 mg po daily x 3 days, then 0.5 mg po bid x 4 days, then 1 mg po bid        ED Medications:    Medications   sodium chloride 0.9 % flush 10 mL ( Intravenous MAR Hold 9/16/21 0000)   aspirin tablet 325 mg (325 mg Oral Given 9/15/21 1823)   Morphine sulfate (PF) injection 4 mg (4 mg Intravenous Given 9/15/21 1827)   ondansetron (ZOFRAN) injection 4 mg (4 mg Intravenous Given 9/15/21 1826)   droperidol (INAPSINE) injection 1.25 mg (1.25 mg Intravenous Given 9/15/21 1856)   diphenhydrAMINE (BENADRYL) injection 25 mg (25 mg Intravenous Given 9/15/21 1856)   iopamidol (ISOVUE-300) 61 % injection 100 mL (100 mL Intravenous Given 9/15/21 2204)   enoxaparin (LOVENOX) syringe 70 mg (70 mg Subcutaneous Given 9/15/21 2312)     Vital Signs:  Temp:  [98 °F (36.7 °C)-98.2 °F (36.8 °C)] 98 °F (36.7 °C)  Heart Rate:  [] 92  Resp:  [20-22] 20  BP: (105-130)/(67-92) 122/80        09/15/21  1806 09/16/21  0014   Weight: 72.6 kg (160 lb) 74.1 kg (163 lb 6.4 oz)     Body mass index is 24.84 kg/m².    Physical Exam:     General Appearance:  Alert and cooperative.    Head:  Atraumatic and normocephalic.   Eyes: Conjunctivae and sclerae normal, no icterus. No pallor.   Ears:  Ears with no abnormalities noted.   Throat: No oral  lesions, no thrush, oral mucosa moist.   Neck: Supple, trachea midline, no thyromegaly.   Back:   No kyphoscoliosis present. No tenderness to palpation.   Lungs:   Breath sounds heard bilaterally equally.  No crackles or wheezing. No Pleural rub or bronchial breathing.   Heart:  Normal S1 and S2, no murmur, no gallop, no rub. No JVD.   Abdomen:   Normal bowel sounds, no masses, no organomegaly. Soft, nontender, nondistended, no rebound tenderness.   Extremities: Supple, no edema, no cyanosis, no clubbing.   Pulses: Pulses palpable bilaterally.   Skin: No bleeding or rash.   Neurologic: Alert and oriented x 3. No facial asymmetry. Moves all four limbs. No tremors.      Laboratory data:    I have reviewed the labs done in the emergency room.    Results from last 7 days   Lab Units 09/15/21  1819 09/09/21  0848   SODIUM mmol/L 138 137   POTASSIUM mmol/L 4.6 3.2*   CHLORIDE mmol/L 102 97*   CO2 mmol/L 27.0 31.7*   BUN mg/dL 14 17   CREATININE mg/dL 1.10 1.18   CALCIUM mg/dL 9.5 9.5   BILIRUBIN mg/dL 0.3  --    ALK PHOS U/L 80  --    ALT (SGPT) U/L 36  --    AST (SGOT) U/L 50*  --    GLUCOSE mg/dL 150* 141*     Results from last 7 days   Lab Units 09/15/21  1819   WBC 10*3/mm3 10.28   HEMOGLOBIN g/dL 14.7   HEMATOCRIT % 45.0   PLATELETS 10*3/mm3 315         Results from last 7 days   Lab Units 09/15/21  2137 09/15/21  1819   TROPONIN T ng/mL 0.133* 0.116*     Results from last 7 days   Lab Units 09/09/21  0848   PROBNP pg/mL 4,676.0*                       Invalid input(s): USDES,  BLOODU, NITRITITE, BACT, EP    Pain Management Panel    There is no flowsheet data to display.         EKG:      EKG personally reviewed, sinus rhythm with rate of 100.  Nonspecific ST-T wave changes.  No signs of ST elevation.    Radiology:    CT Chest Pulmonary Embolism    Result Date: 9/15/2021  FINAL REPORT TECHNIQUE: Thin section axial images were obtained through the chest and during the arterial phase of IV contrast administration.  Coronal 3-D MIP reconstructed images were also provided. CLINICAL HISTORY: chest pain, elevated d-dimer.  soa FINDINGS: The pulmonary arteries are well-opacified and there is no filling defect to indicate pulmonary embolism. The thoracic aorta not well opacified, but no abnormality is seen.  There is mild-to-moderate emphysema.  There is marked interlobular septal thickening in lung bases consistent with interstitial pulmonary edema.  There are small bilateral pleural effusions.     No pulmonary embolism.  Small pleural effusions and interstitial pulmonary edema. Authenticated by Lai De Los Santos M.D. on 09/15/2021 11:26:06 PM      Assessment:    NSTEMI, POA  Severe heart failure with reduced ejection fraction requiring LifeVest, POA  Hypertension  Hyperlipidemia  History of tobacco abuse    Plan:    We will admit patient to the hospital with cardiac monitoring.  N.p.o. after midnight.  Treatment dose Lovenox.  Have restarted patient's home Entresto and Coreg.  Will obtain lipid panel and A1c with morning labs.  Patient received 325 mg aspirin in the ED.  Dr. Meyers consulted.  Plan for left heart catheterization in the morning.  Currently patient is chest pain-free.  Further orders as clinical course dictates.    Risk Assessment: High  DVT Prophylaxis: Treatment Lovenox  Code Status: Full  Diet: N.p.o.    Advance Care Planning   ACP discussion was declined by the patient. Patient does not have an advance directive, declines further assistance.      Johnny Solorzano DO  09/16/21  00:39 EDT    Dictated utilizing Dragon dictation.

## 2021-09-16 NOTE — PROGRESS NOTES
[facility]    Name:  Tristan Castle   Age:  61 y.o.  Sex:  male  :  1960  MRN:  [id number]   Visit Number:  [id number]  Admission Date:  9/15/2021  Date Of Service:  21  Primary Care Physician:  Marybel Fleming MD    Patient was seen and examined. Pertinent laboratory and radiology data were reviewed.    Vital signs:    Vital Signs (last 24 hours)       09/15 0700  -   0659  0700  -   1900   Most Recent    Temp (°F) 97.5 -  98.2    97.4 -  99.2     99.2 (37.3)    Heart Rate 89 -  100    85 -  93     89    Resp 20 -  22    17 -  21     17    /67 -  130/92    105/64 -  124/64     107/70    SpO2 (%) 94 -  100    94 -  99     97          Patient no longer having chest pain. His only complaint is that of being anxious for the heart catheterization today and is concerned about lying flat for an extended period of time due to chronic lower back pain.     - Entresto and Coreg Continued  - Treatment Lovenox  - He is wearing his LifeVest  - Will await results of heart cath for further recommendations  - Will likely be discharged in the AM    Nhung Henry DO  21  19:00 EDT    Dictated utilizing Dragon dictation.

## 2021-09-16 NOTE — PLAN OF CARE
Goal Outcome Evaluation:  Plan of Care Reviewed With: patient        Progress: no change  Outcome Summary: pt admitted for NSTEMI, pt will have consult with Dr. Meyers in the AM and will go for heart cath

## 2021-09-16 NOTE — PHARMACY RECOMMENDATION
"Pharmacy Consult - Enoxaparin Dosing    Tristan Castle is a 61 y.o. male who has been consulted to dose enoxaparin for full anticoagulation/NSTEMI     Allergies    Patient has no known allergies.    Relevant clinical data and objective history reviewed:     [Ht: 172.7 cm (68\"); Wt: 74.1 kg (163 lb 6.4 oz)]    Body mass index is 24.84 kg/m².  Estimated Creatinine Clearance: 73.9 mL/min (by C-G formula based on SCr of 1.1 mg/dL).    Results from last 7 days   Lab Units 09/15/21  1819 09/09/21  0848   HEMOGLOBIN g/dL 14.7  --    HEMATOCRIT % 45.0  --    PLATELETS 10*3/mm3 315  --    CREATININE mg/dL 1.10 1.18       Asessment/Plan  Initiate enoxaparin 70 mg SQ every 12 hours  Pharmacy will monitor Mr. Castle's renal function and clinical status and adjust the enoxaparin dose and/or frequency as needed.    Thanks,     Rayna Reyes [facility]  9/16/2021  01:44 EDT      "
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
## 2021-09-16 NOTE — NURSING NOTE
Referral received for Phase II Cardiac Rehab. Staff reviewed chart and patient has qualifying diagnosis for Phase II Cardiac Rehab.  Met with patient, discussed benefits of exercise, program protocol with ed. material provided

## 2021-09-16 NOTE — ED NOTES
Patient will be going to [id number]. RN, Marie notified.      Tessy Back  09/15/21 5058

## 2021-09-16 NOTE — CASE MANAGEMENT/SOCIAL WORK
Discharge Planning Assessment   Anthony     Patient Name: Tristan Castle  MRN: [id number]  Today's Date: 9/16/2021    Admit Date: 9/15/2021    Discharge Needs Assessment     Row Name 09/16/21 1600       Living Environment    Lives With  spouse    Name(s) of Who Lives With Patient  Jay Castle (Spouse)    Current Living Arrangements  home/apartment/condo    Primary Care Provided by  self    Provides Primary Care For  no one    Family Caregiver if Needed  spouse    Quality of Family Relationships  supportive    Able to Return to Prior Arrangements  yes    Living Arrangement Comments  Pt reports no needs. Anticipates discharge home       Resource/Environmental Concerns    Resource/Environmental Concerns  none       Transition Planning    Patient/Family Anticipates Transition to  home    Patient/Family Anticipated Services at Transition  none    Transportation Anticipated  family or friend will provide       Discharge Needs Assessment    Readmission Within the Last 30 Days  no previous admission in last 30 days    Equipment Currently Used at Home  other (see comments) LifeVest    Concerns to be Addressed  no discharge needs identified    Equipment Needed After Discharge  none        Discharge Plan     Row Name 09/16/21 1602       Plan    Plan Comments SW met with pt at bedside to complete discharge planning. Confirmed pt's address as listed in the chart. Pt lives with his spouse, Jay. Pt reports being independent at home. Pt has a LifeVest but not other equipment. Reports no need for any equipment or HH at this time. Declines wanting rehab. Pt's spouse states that the pt will be doing Cardiac Rehab. Pt's PCP confirmed as listed in the chart. Pt plans to discharge home when ready. Pt's spouse will transport. No other CM needs have been identified at this time. SW will continue to follow and assist as needed.        Continued Care and Services - Admitted Since 9/15/2021    Coordination has not been started for this  encounter.         Demographic Summary     Row Name 09/16/21 1557       General Information    Admission Type  inpatient    Arrived From  emergency department    Required Notices Provided  Important Message from Medicare    Referral Source  emergency department    Reason for Consult  discharge planning    Preferred Language  English     Used During This Interaction  no        Functional Status     Row Name 09/16/21 1559       Functional Status    Usual Activity Tolerance  moderate       Functional Status, IADL    Medications  independent    Meal Preparation  independent    Housekeeping  independent    Laundry  independent    Shopping  independent       Employment/    Employment Status  disabled        Psychosocial     Row Name 09/16/21 1551       Intellectual Performance WDL    Level of Consciousness  Alert       Coping/Stress    Patient Personal Strengths  strong support system;positive attitude    Sources of Support  significant other       Developmental Stage (Eriksson's)    Developmental Stage  Stage 7 (35-65 years/Middle Adulthood) Generativity vs. Stagnation        Abuse/Neglect    No documentation.       Legal    No documentation.       Substance Abuse    No documentation.       Patient Forms    No documentation.           KONSTANTIN Montano
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
## 2021-09-17 ENCOUNTER — READMISSION MANAGEMENT (OUTPATIENT)
Dept: CALL CENTER | Facility: HOSPITAL | Age: 61
End: 2021-09-17

## 2021-09-17 ENCOUNTER — APPOINTMENT (OUTPATIENT)
Dept: NUCLEAR MEDICINE | Facility: HOSPITAL | Age: 61
End: 2021-09-17

## 2021-09-17 ENCOUNTER — TELEPHONE (OUTPATIENT)
Dept: CARDIOLOGY | Facility: CLINIC | Age: 61
End: 2021-09-17

## 2021-09-17 VITALS
HEART RATE: 84 BPM | OXYGEN SATURATION: 100 % | TEMPERATURE: 97.8 F | WEIGHT: 163.4 LBS | HEIGHT: 68 IN | SYSTOLIC BLOOD PRESSURE: 116 MMHG | BODY MASS INDEX: 24.77 KG/M2 | DIASTOLIC BLOOD PRESSURE: 70 MMHG | RESPIRATION RATE: 14 BRPM

## 2021-09-17 LAB
QT INTERVAL: 372 MS
QT INTERVAL: 380 MS
QTC INTERVAL: 457 MS
QTC INTERVAL: 462 MS

## 2021-09-17 PROCEDURE — 93010 ELECTROCARDIOGRAM REPORT: CPT | Performed by: INTERNAL MEDICINE

## 2021-09-17 PROCEDURE — 25010000002 MORPHINE SULFATE (PF) 2 MG/ML SOLUTION: Performed by: INTERNAL MEDICINE

## 2021-09-17 PROCEDURE — 99238 HOSP IP/OBS DSCHRG MGMT 30/<: CPT | Performed by: STUDENT IN AN ORGANIZED HEALTH CARE EDUCATION/TRAINING PROGRAM

## 2021-09-17 RX ORDER — ATORVASTATIN CALCIUM 80 MG/1
80 TABLET, FILM COATED ORAL DAILY
Qty: 90 TABLET | Refills: 3 | Status: SHIPPED | OUTPATIENT
Start: 2021-09-17 | End: 2022-10-10

## 2021-09-17 RX ORDER — ASPIRIN 81 MG/1
81 TABLET ORAL DAILY
Qty: 90 TABLET | Refills: 3 | Status: SHIPPED | OUTPATIENT
Start: 2021-09-17

## 2021-09-17 RX ORDER — CARVEDILOL 6.25 MG/1
6.25 TABLET ORAL 2 TIMES DAILY
Qty: 180 TABLET | Refills: 3 | Status: SHIPPED | OUTPATIENT
Start: 2021-09-17 | End: 2022-10-10

## 2021-09-17 RX ORDER — SACUBITRIL AND VALSARTAN 24; 26 MG/1; MG/1
1 TABLET, FILM COATED ORAL 2 TIMES DAILY
Qty: 180 TABLET | Refills: 3 | Status: SHIPPED | OUTPATIENT
Start: 2021-09-17 | End: 2021-09-21 | Stop reason: SDUPTHER

## 2021-09-17 RX ADMIN — MORPHINE SULFATE 2 MG: 2 INJECTION, SOLUTION INTRAMUSCULAR; INTRAVENOUS at 00:30

## 2021-09-17 NOTE — NURSING NOTE
At 0300 on 9/17/2021 Mr. Castle informed me that he wanted to sign out AMA. I immediately called Dr. Solorzano and informed her. I did education with the pt of possible risk for leaving AMA. I also informed the pt that if he has any chest pain or shortness of breath that is not resolving then come to the ED immediately. I also spoke to Vikram, the wife, and educated her. I informed the pt and his wife to call Dr. Meyers first thing in the morning for medication and direction of care and his primary MD. Pt still wanted to leave. AMA paper was signed.

## 2021-09-17 NOTE — TELEPHONE ENCOUNTER
Pt notified and states verbal understanding. Pt states his chantix is going to be $200. I will start a PA on this

## 2021-09-17 NOTE — NURSING NOTE
I went to check on pt because he complained of SOB and inability to catch his breath. These spell last for only 10 seconds or so. To the patient it seems like minutes. I noticed while sitting on his bed talking with pt that when these spells happen, he is having PVC's on the monitor. I explained to him what is was that is happening. This seems to help him some as far as anxiety.   My education for this patient was PVC's.    Overview  Premature ventricular contractions (PVCs) are extra heartbeats that begin in one of your heart's two lower pumping chambers (ventricles). These extra beats disrupt your regular heart rhythm, sometimes causing you to feel a fluttering or a skipped beat in your chest.    Premature ventricular contractions are common -- they occur in many people. They're also called:    Premature ventricular complexes  Ventricular premature beats  Ventricular extrasystoles  If you have occasional premature ventricular contractions, but you're otherwise healthy, there's probably no reason for concern, and no need for treatment. If you have frequent premature ventricular contractions or underlying heart disease, you might need treatment.    Symptoms  Premature ventricular contractions often cause few or no symptoms. But you might feel an odd sensation in your chest, such as: Sometimes can cause SOB, or so it seems.     Fluttering  Pounding or jumping  Skipped beats or missed beats  Increased awareness of your heartbeat  When to see a doctor  If you feel fluttering, a sensation of skipped heartbeats or odd feelings in your chest, talk to your doctor. You'll want to identify the source of these symptoms, whether it's PVCs, other heart rhythm problems, serious heart problems, anxiety, anemia or infections.    Tell Dr. Solorzano or Dr. Meyers  Causes  Normal heartbeat  Normal heartbeatOpen pop-up dialog box  Your heart is made up of four chambers -- two upper chambers (atria) and two lower chambers (ventricles).  The rhythm of your heart is normally controlled by the sinoatrial (SA) node -- or sinus node -- an area of specialized cells in the right atrium.    This natural pacemaker produces the electrical impulses that trigger the normal heartbeat. From the sinus node, electrical impulses travel across the atria to the ventricles, causing them to contract and pump blood to your lungs and body.    PVCs are abnormal contractions that begin in the ventricles. These extra contractions usually beat sooner than the next expected regular heartbeat. And they often interrupt the normal order of pumping, which is the atria first, then the ventricles.    Why do extra beats occur?  The reasons aren't always clear. Certain triggers, heart diseases or changes in the body can make cells in the ventricles electrically unstable. Heart disease or scarring may also cause electrical impulses to be misrouted.    Premature ventricular contractions can be associated with:    Certain medications, including decongestants and antihistamines  Alcohol or illegal drugs  Increased levels of adrenaline in the body that may be caused by caffeine, tobacco, exercise or anxiety  Injury to the heart muscle from coronary artery disease, congenital heart disease, high blood pressure or heart failure  Risk factors  The following can increase your risk of PVCs:    Caffeine , tobacco, alcohol and illicit drugs  Exercise -- if you have certain types of PVCs  High blood pressure (hypertension)  Anxiety  Heart disease, including congenital heart disease, coronary artery disease, heart attack, heart failure and a weakened heart muscle (cardiomyopathy)

## 2021-09-17 NOTE — PAYOR COMM NOTE
"Rand Castle (61 y.o. Male)     Date of Birth Social Security Number Address Home Phone MRN    1960  [facility] [address] [phone] [id number]    Yazdanism Marital Status          None        Admission Date Admission Type Admitting Provider Attending Provider Department, Room/Bed    9/15/21 Emergency Nhung Henry DO  [facility] COVID19 SURGE ICU ON PREOP PHASE II, P08/1    Discharge Date Discharge Disposition Discharge Destination        9/17/2021 Home or Self Care              Attending Provider: (none)   Allergies: No Known Allergies    Isolation: None   Infection: None   Code Status: Prior    Ht: 172.7 cm (68\")   Wt: 74.1 kg (163 lb 6.4 oz)    Admission Cmt: None   Principal Problem: Non-STEMI (non-ST elevated myocardial infarction) (CMS/HCC) [I21.4]                 Active Insurance as of 9/15/2021     Primary Coverage     Payor Plan Insurance Group Employer/Plan Group    [facility] [employer] MEDICARE REPLACEMENT      Payor Plan Address Payor Plan Phone Number Payor Plan Fax Number Effective Dates    [address] [address] [phone]  2/12/2018 - None Entered    [facility] [id number]       Subscriber Name Subscriber Birth Date Member ID       RAND CASTLE 1960 [id number]                 Emergency Contacts      (Rel.) Home Phone Work Phone Mobile Phone    Jay Castle (Spouse) [phone] -- --      Provider ID [id number]  Payor Plan Specialty Last Name Range Additional Info Mode                [facility] MEDICARE REPLACEMENT     Fax    Internal comment   Fax number: [phone]       Phone number: None entered       Contact name: None entered       Last communication          Document Sent Last Sent Sent By Status First Sent View Resend                Payor Comm Note 9/16/21 9:07 Beth Luna RN Completed 9/16/21 9:07                     History & Physical      Johnny Solorzano DO at " 21 0022              [facility]   HISTORY AND PHYSICAL      Name:  Tristan Castle   Age:  61 y.o.  Sex:  male  :  1960  MRN:  [id number]   Visit Number:  [id number]  Admission Date:  9/15/2021  Date Of Service:  21  Primary Care Physician:  Marybel Fleming MD    Chief Complaint:     Chest pain    History Of Presenting Illness:      Patient is a very pleasant 61-year-old male with history significant for severe heart failure currently wearing LifeVest, hypertension, hyperlipidemia and COPD who presented to the hospital today with complaints of abrupt onset sharp stabbing left-sided chest pain.  Patient reports that he was asleep when the pain woke him.  Did not relieve until he came to the emergency room.  Patient also states that it was associated with shortness of breath.  Denies fever/chills, nausea/vomiting, diarrhea.  Patient follows with Dr. Meyers and was scheduled to undergo outpatient left heart catheterization in the morning.   In the ER, patient was hemodynamically stable 94% on room air.  Labs are significant for troponin of 0.116 elevated to 0.133.  D-dimer was 1.36, proBNP was 4676.  CTA was negative for pulmonary embolus.  Dr. Meyers was contacted and agreed to consult.  Hospital service contacted for admission.  Currently patient is chest pain-free.    Review Of Systems:    All systems were reviewed and negative except as mentioned in history of presenting illness, assessment and plan.    Past Medical History: Patient  has a past medical history of CHF (congestive heart failure) (CMS/[facility]), COPD (chronic obstructive pulmonary disease) (CMS/[facility]), Emphysema/COPD (CMS/[facility]), Hyperlipidemia, and Hypertension.    Past Surgical History: Patient  has a past surgical history that includes Appendectomy; Tennis Elbow Release (Left); and Ankle surgery (Right).    Social History: Patient  reports that he has been smoking cigarettes. He has a 12.50 pack-year smoking  history. He has never used smokeless tobacco. He reports previous alcohol use. He reports previous drug use.    Family History: Patient's family history is not on file.    Allergies:      Patient has no known allergies.    Home Medications:    Prior to Admission Medications     Prescriptions Last Dose Informant Patient Reported? Taking?    carvedilol (COREG) 3.125 MG tablet   No Yes    Take 1 tablet by mouth 2 (Two) Times a Day.    sacubitril-valsartan (Entresto) 24-26 MG tablet   No Yes    Take 1 tablet by mouth 2 (Two) Times a Day.    varenicline (Chantix Continuing Month Roland) 1 MG tablet   No Yes    Take 1 tablet by mouth 2 (Two) Times a Day for 56 days.    varenicline (CHANTIX ROLAND) 0.5 MG X 11 & 1 MG X 42 tablet   No Yes    Take 0.5 mg po daily x 3 days, then 0.5 mg po bid x 4 days, then 1 mg po bid        ED Medications:    Medications   sodium chloride 0.9 % flush 10 mL ( Intravenous MAR Hold 9/16/21 0000)   aspirin tablet 325 mg (325 mg Oral Given 9/15/21 1823)   Morphine sulfate (PF) injection 4 mg (4 mg Intravenous Given 9/15/21 1827)   ondansetron (ZOFRAN) injection 4 mg (4 mg Intravenous Given 9/15/21 1826)   droperidol (INAPSINE) injection 1.25 mg (1.25 mg Intravenous Given 9/15/21 1856)   diphenhydrAMINE (BENADRYL) injection 25 mg (25 mg Intravenous Given 9/15/21 1856)   iopamidol (ISOVUE-300) 61 % injection 100 mL (100 mL Intravenous Given 9/15/21 2204)   enoxaparin (LOVENOX) syringe 70 mg (70 mg Subcutaneous Given 9/15/21 2312)     Vital Signs:  Temp:  [98 °F (36.7 °C)-98.2 °F (36.8 °C)] 98 °F (36.7 °C)  Heart Rate:  [] 92  Resp:  [20-22] 20  BP: (105-130)/(67-92) 122/80        09/15/21  1806 09/16/21  0014   Weight: 72.6 kg (160 lb) 74.1 kg (163 lb 6.4 oz)     Body mass index is 24.84 kg/m².    Physical Exam:     General Appearance:  Alert and cooperative.    Head:  Atraumatic and normocephalic.   Eyes: Conjunctivae and sclerae normal, no icterus. No pallor.   Ears:  Ears with no abnormalities  noted.   Throat: No oral lesions, no thrush, oral mucosa moist.   Neck: Supple, trachea midline, no thyromegaly.   Back:   No kyphoscoliosis present. No tenderness to palpation.   Lungs:   Breath sounds heard bilaterally equally.  No crackles or wheezing. No Pleural rub or bronchial breathing.   Heart:  Normal S1 and S2, no murmur, no gallop, no rub. No JVD.   Abdomen:   Normal bowel sounds, no masses, no organomegaly. Soft, nontender, nondistended, no rebound tenderness.   Extremities: Supple, no edema, no cyanosis, no clubbing.   Pulses: Pulses palpable bilaterally.   Skin: No bleeding or rash.   Neurologic: Alert and oriented x 3. No facial asymmetry. Moves all four limbs. No tremors.      Laboratory data:    I have reviewed the labs done in the emergency room.    Results from last 7 days   Lab Units 09/15/21  1819 09/09/21  0848   SODIUM mmol/L 138 137   POTASSIUM mmol/L 4.6 3.2*   CHLORIDE mmol/L 102 97*   CO2 mmol/L 27.0 31.7*   BUN mg/dL 14 17   CREATININE mg/dL 1.10 1.18   CALCIUM mg/dL 9.5 9.5   BILIRUBIN mg/dL 0.3  --    ALK PHOS U/L 80  --    ALT (SGPT) U/L 36  --    AST (SGOT) U/L 50*  --    GLUCOSE mg/dL 150* 141*     Results from last 7 days   Lab Units 09/15/21  1819   WBC 10*3/mm3 10.28   HEMOGLOBIN g/dL 14.7   HEMATOCRIT % 45.0   PLATELETS 10*3/mm3 315         Results from last 7 days   Lab Units 09/15/21  2137 09/15/21  1819   TROPONIN T ng/mL 0.133* 0.116*     Results from last 7 days   Lab Units 09/09/21  0848   PROBNP pg/mL 4,676.0*                       Invalid input(s): USDES,  BLOODU, NITRITITE, BACT, EP    Pain Management Panel    There is no flowsheet data to display.         EKG:      EKG personally reviewed, sinus rhythm with rate of 100.  Nonspecific ST-T wave changes.  No signs of ST elevation.    Radiology:    CT Chest Pulmonary Embolism    Result Date: 9/15/2021  FINAL REPORT TECHNIQUE: Thin section axial images were obtained through the chest and during the arterial phase of IV  contrast administration. Coronal 3-D MIP reconstructed images were also provided. CLINICAL HISTORY: chest pain, elevated d-dimer.  soa FINDINGS: The pulmonary arteries are well-opacified and there is no filling defect to indicate pulmonary embolism. The thoracic aorta not well opacified, but no abnormality is seen.  There is mild-to-moderate emphysema.  There is marked interlobular septal thickening in lung bases consistent with interstitial pulmonary edema.  There are small bilateral pleural effusions.     No pulmonary embolism.  Small pleural effusions and interstitial pulmonary edema. Authenticated by Lai De Los Santos M.D. on 09/15/2021 11:26:06 PM      Assessment:    NSTEMI, POA  Severe heart failure with reduced ejection fraction requiring LifeVest, POA  Hypertension  Hyperlipidemia  History of tobacco abuse    Plan:    We will admit patient to the hospital with cardiac monitoring.  N.p.o. after midnight.  Treatment dose Lovenox.  Have restarted patient's home Entresto and Coreg.  Will obtain lipid panel and A1c with morning labs.  Patient received 325 mg aspirin in the ED.  Dr. Meyers consulted.  Plan for left heart catheterization in the morning.  Currently patient is chest pain-free.  Further orders as clinical course dictates.    Risk Assessment: High  DVT Prophylaxis: Treatment Lovenox  Code Status: Full  Diet: N.p.o.    Advance Care Planning   ACP discussion was declined by the patient. Patient does not have an advance directive, declines further assistance.      Johnny Solorzano DO  21  00:39 EDT    Dictated utilizing Dragon dictation.      Electronically signed by Johnny Solorzano DO at 21 0051          Physician Progress Notes (last 48 hours) (Notes from 09/15/21 1024 through 21 1024)      Nhung Henry DO at 21 1007              [facility]    Name:  Tristan Castle   Age:  61 y.o.  Sex:  male  :  1960  MRN:  [id number]   Visit  Number:  [id number]  Admission Date:  9/15/2021  Date Of Service:  21  Primary Care Physician:  Marybel Fleming MD    Patient was seen and examined. Pertinent laboratory and radiology data were reviewed.    Vital signs:    Vital Signs (last 24 hours)       09/15 0700  -   0659  0700  -   1900   Most Recent    Temp (°F) 97.5 -  98.2    97.4 -  99.2     99.2 (37.3)    Heart Rate 89 -  100    85 -  93     89    Resp  -   -       17    /67 -  130/92    105/64 -  124/64     107/70    SpO2 (%) 94 -  100    94 -  99     97          Patient no longer having chest pain. His only complaint is that of being anxious for the heart catheterization today and is concerned about lying flat for an extended period of time due to chronic lower back pain.     - Entresto and Coreg Continued  - Treatment Lovenox  - He is wearing his LifeVest  - Will await results of heart cath for further recommendations  - Will likely be discharged in the AM    Nhung Henry DO  21  19:00 EDT    Dictated utilizing Dragon dictation.    Electronically signed by Nhung Henry DO at 21 1903          Discharge Summary      Nhung Henry DO at 21 0753              [facility]   DISCHARGE SUMMARY      Name:  Tristan Castle   Age:  61 y.o.  Sex:  male  :  1960  MRN:  [id number]   Visit Number:  [id number]    Admission Date:  9/15/2021  Date of Discharge:  2021  Primary Care Physician:  Marybel Fleming MD    Important issues to note:    PATIENT LEFT AGAINST MEDICAL ADVICE    Discharge Diagnoses:     1. NSTEMI, POA  2. Severe Heart Failure with Reduced Ejection Fraction requiring LifeVest, POA  3. Essential Hypertension  4. Hyperlipidemia  5. History of Tobacco Abuse    Problem List:     Active Hospital Problems    Diagnosis  POA   • **Non-STEMI (non-ST elevated myocardial infarction) (CMS/[facility]) [I21.4]  Yes   • Chronic systolic congestive heart failure  (CMS/[facility]) [I50.22]  Yes      Resolved Hospital Problems   No resolved problems to display.     Presenting Problem:    Chief Complaint   Patient presents with   • Chest Pain     left sided cp started 45 min ago      Consults:     Consulting Physician(s)  Chat With All Active Members    Provider Relationship Specialty    Michael Meyers MD  Consulting Physician Cardiology        Procedures Performed:    Procedure(s):  Coronary angiography  Percutaneous Coronary Intervention  Left Heart Cath    History of presenting illness/Hospital Course:    61-year-old male with history significant for severe heart failure currently wearing LifeVest, hypertension, hyperlipidemia and COPD who presented to the hospital today with complaints of abrupt onset sharp stabbing left-sided chest pain.  Patient reports that he was asleep when the pain woke him.  Did not relieve until he came to the emergency room. Patient follows with Dr. Meyers and was scheduled to undergo outpatient left heart catheterization in the morning.  In the ER, patient was hemodynamically stable 94% on room air.  Labs are significant for troponin of 0.116 elevated to 0.133.  D-dimer was 1.36, proBNP was 4676.  CTA was negative for pulmonary embolus. Dr. Meyers was contacted and agreed to consult with plans for left heart catheterization.     9/16/21 patient was taken for left heart catheterization where he was found to have severe two-vessel coronary artery disease. Successful catheter-based revascularization of mid LCx stenosis utilizing overlapping drug-eluting stents  Chronically occluded RCA does not have angiographic features to predict successful  revascularization  · Extremely small bilateral radial arteries with tendency towards severe spasm. Recommendations from cardiology: Enteric-coated aspirin indefinitely  · Uninterrupted dual antiplatelet therapy for minimum of 12 months  · Medical antianginal therapy for chronically occluded RCA  · Emphasis  on aggressive secondary risk factor modification  · The patient has been counseled regarding the essential need to discontinue cigarette smoking  · Outpatient referral for cardiac rehab  · Any future cardiac catheterization should be performed from a femoral approach    Patient was to be observed overnight on telemetry with repeat EKG in the AM, but patient left AMA.     Vital Signs:    Temp:  [97.4 °F (36.3 °C)-99.2 °F (37.3 °C)] 97.8 °F (36.6 °C)  Heart Rate:  [84-93] 84  Resp:  [14-20] 14  BP: (105-124)/(55-80) 116/70      Pertinent Lab Results:     Results from last 7 days   Lab Units 09/16/21  1143 09/16/21  0633 09/15/21  1819   SODIUM mmol/L 138 139 138   POTASSIUM mmol/L 4.7 4.6 4.6   CHLORIDE mmol/L 105 105 102   CO2 mmol/L 24.6 25.5 27.0   BUN mg/dL 14 13 14   CREATININE mg/dL 0.89 0.95 1.10   CALCIUM mg/dL 9.0 9.0 9.5   BILIRUBIN mg/dL  --   --  0.3   ALK PHOS U/L  --   --  80   ALT (SGPT) U/L  --   --  36   AST (SGOT) U/L  --   --  50*   GLUCOSE mg/dL 128* 123* 150*     Results from last 7 days   Lab Units 09/16/21  1144 09/16/21  0633 09/15/21  1819   WBC 10*3/mm3 10.70 11.09* 10.28   HEMOGLOBIN g/dL 13.8 13.8 14.7   HEMATOCRIT % 42.1 42.8 45.0   PLATELETS 10*3/mm3 271 272 315         Results from last 7 days   Lab Units 09/15/21  2137 09/15/21  1819   TROPONIN T ng/mL 0.133* 0.116*                           Pertinent Radiology Results:    Imaging Results (All)     Procedure Component Value Units Date/Time    XR Chest 1 View [[id number]] Collected: 09/16/21 1027     Updated: 09/16/21 1139    Narrative:      PROCEDURE: XR CHEST 1 VW-        HISTORY: Chest Pain Triage Protocol     COMPARISON: None.     FINDINGS: The heart is normal in size. The mediastinum is unremarkable.  There is interstitial edema. There is no pneumothorax. There are no  acute osseous abnormalities.       Impression:      Interstitial edema.                       Images were reviewed, interpreted, and dictated by   Sofi Rubio M.D.  Transcribed by Sarah Gibson PA-C.     This report was finalized on 9/16/2021 11:37 AM by Sofi Rubio M.D..    CT Chest Pulmonary Embolism [[id number]] Collected: 09/15/21 2326     Updated: 09/15/21 2327    Narrative:      FINAL REPORT    TECHNIQUE:  Thin section axial images were obtained through the chest and  during the arterial phase of IV contrast administration. Coronal  3-D MIP reconstructed images were also provided.    CLINICAL HISTORY:  chest pain, elevated d-dimer.  soa    FINDINGS:  The pulmonary arteries are well-opacified and there is no  filling defect to indicate pulmonary embolism. The thoracic  aorta not well opacified, but no abnormality is seen.  There is  mild-to-moderate emphysema.  There is marked interlobular septal  thickening in lung bases consistent with interstitial pulmonary  edema.  There are small bilateral pleural effusions.      Impression:      No pulmonary embolism.  Small pleural effusions and interstitial  pulmonary edema.    Authenticated by Lai De Los Santos M.D. on 09/15/2021 11:26:06 PM          Echo:    Results for orders placed in visit on 08/16/21    Adult Transthoracic Echo Complete W/ Cont if Necessary Per Protocol    Interpretation Summary  · The left ventricular cavity is moderately dilated.  · The findings are consistent with dilated cardiomyopathy.  · Estimated left ventricular EF = 32% Left ventricular ejection fraction appears to be 31 - 35%. Left ventricular systolic function is moderately decreased.  · Left ventricular diastolic function is consistent with (grade III w/high LAP) reversible restrictive pattern.  · Left atrial volume is moderately increased.  · Mild aortic valve stenosis is present.  · There is mainly affecting the non-coronary, left coronary and right coronary cusp(s).    Condition on Discharge:      Stable.    Code status during the hospital stay:    Code Status and Medical Interventions:   Ordered at: 09/15/21 2314      Code Status:    CPR     Medical Interventions (Level of Support Prior to Arrest):    Full     Discharge Disposition:    LEFT AMA    Discharge Medications:       Discharge Medications      ASK your doctor about these medications      Instructions Start Date   carvedilol 3.125 MG tablet  Commonly known as: COREG   3.125 mg, Oral, 2 Times Daily      Entresto 24-26 MG tablet  Generic drug: sacubitril-valsartan   1 tablet, Oral, 2 Times Daily      varenicline 0.5 MG X 11 & 1 MG X 42 tablet  Commonly known as: CHANTIX ROLAND   Take 0.5 mg po daily x 3 days, then 0.5 mg po bid x 4 days, then 1 mg po bid      varenicline 1 MG tablet  Commonly known as: Chantix Continuing Month Roland   1 mg, Oral, 2 Times Daily   Start Date: October 8, 2021               Nhung Henry DO  09/17/21  07:54 EDT    Time: I spent 21 minutes on this discharge activity which included: face-to-face encounter with the patient, reviewing the data in the system, coordination of the care with the nursing staff as well as consultants, documentation, and entering orders.     Dictated utilizing Dragon dictation.      Electronically signed by Nhung Henry DO at 09/17/21 075

## 2021-09-17 NOTE — NURSING NOTE
Follow up vitals after morphine are; 84 heart rate, 14 respirations, 100% spo2, 116/70 blood pressure. Pt still states he can not breath at times. These spells only lasts for a few seconds.

## 2021-09-17 NOTE — TELEPHONE ENCOUNTER
Pt called stating that he left AMA last night due to lack of sleep and was calling asking what medications he needs to be taking since he had blockages. Please advise.

## 2021-09-17 NOTE — CASE MANAGEMENT/SOCIAL WORK
Case Management Discharge Note                Selected Continued Care - Discharged on 9/17/2021 Admission date: 9/15/2021 - Discharge disposition: Home or Self Care    Destination    No services have been selected for the patient.              Durable Medical Equipment    No services have been selected for the patient.              Dialysis/Infusion    No services have been selected for the patient.              Home Medical Care    No services have been selected for the patient.              Therapy    No services have been selected for the patient.              Community Resources    No services have been selected for the patient.              Community & DME    No services have been selected for the patient.                       Final Discharge Disposition Code: 07 - left AMA

## 2021-09-17 NOTE — OUTREACH NOTE
Prep Survey      Responses   [facility] facility patient discharged from?  [place]   Is LACE score < 7 ?  No   Emergency Room discharge w/ pulse ox?  No   Eligibility  TCM   [facility]   Date of Admission  09/15/21   Date of Discharge  09/17/21   Discharge Disposition  Left Against Medical Advice   Discharge diagnosis  NSTEMI, severe CHF, life Vest, heart cath & stents   Does the patient have one of the following disease processes/diagnoses(primary or secondary)?  Acute MI (STEMI,NSTEMI)   Does the patient have Home health ordered?  No   Is there a DME ordered?  No   Prep survey completed?  Yes          Petra Heath RN

## 2021-09-17 NOTE — NURSING NOTE
Dr. Solorzano came down and saw patient and spoke with him about his care. Dr. Solorzano had me give him 2 mg of Morphine.

## 2021-09-17 NOTE — DISCHARGE SUMMARY
[facility]   DISCHARGE SUMMARY      Name:  Tristan Castle   Age:  61 y.o.  Sex:  male  :  1960  MRN:  [id number]   Visit Number:  [id number]    Admission Date:  9/15/2021  Date of Discharge:  2021  Primary Care Physician:  Marybel Fleming MD    Important issues to note:    PATIENT LEFT AGAINST MEDICAL ADVICE    Discharge Diagnoses:     1. NSTEMI, POA  2. Severe Heart Failure with Reduced Ejection Fraction requiring LifeVest, POA  3. Essential Hypertension  4. Hyperlipidemia  5. History of Tobacco Abuse    Problem List:     Active Hospital Problems    Diagnosis  POA   • **Non-STEMI (non-ST elevated myocardial infarction) (CMS/[facility]) [I21.4]  Yes   • Chronic systolic congestive heart failure (CMS/[facility]) [I50.22]  Yes      Resolved Hospital Problems   No resolved problems to display.     Presenting Problem:    Chief Complaint   Patient presents with   • Chest Pain     left sided cp started 45 min ago      Consults:     Consulting Physician(s)  Chat With All Active Members    Provider Relationship Specialty    Michael Myeers MD  Consulting Physician Cardiology        Procedures Performed:    Procedure(s):  Coronary angiography  Percutaneous Coronary Intervention  Left Heart Cath    History of presenting illness/Hospital Course:    61-year-old male with history significant for severe heart failure currently wearing LifeVest, hypertension, hyperlipidemia and COPD who presented to the hospital today with complaints of abrupt onset sharp stabbing left-sided chest pain.  Patient reports that he was asleep when the pain woke him.  Did not relieve until he came to the emergency room. Patient follows with Dr. Meyers and was scheduled to undergo outpatient left heart catheterization in the morning.  In the ER, patient was hemodynamically stable 94% on room air.  Labs are significant for troponin of 0.116 elevated to 0.133.  D-dimer was 1.36, proBNP was 4676.  CTA was negative for  pulmonary embolus. Dr. Meyers was contacted and agreed to consult with plans for left heart catheterization.     9/16/21 patient was taken for left heart catheterization where he was found to have severe two-vessel coronary artery disease. Successful catheter-based revascularization of mid LCx stenosis utilizing overlapping drug-eluting stents  Chronically occluded RCA does not have angiographic features to predict successful  revascularization  · Extremely small bilateral radial arteries with tendency towards severe spasm. Recommendations from cardiology: Enteric-coated aspirin indefinitely  · Uninterrupted dual antiplatelet therapy for minimum of 12 months  · Medical antianginal therapy for chronically occluded RCA  · Emphasis on aggressive secondary risk factor modification  · The patient has been counseled regarding the essential need to discontinue cigarette smoking  · Outpatient referral for cardiac rehab  · Any future cardiac catheterization should be performed from a femoral approach    Patient was to be observed overnight on telemetry with repeat EKG in the AM, but patient left AMA.     Vital Signs:    Temp:  [97.4 °F (36.3 °C)-99.2 °F (37.3 °C)] 97.8 °F (36.6 °C)  Heart Rate:  [84-93] 84  Resp:  [14-20] 14  BP: (105-124)/(55-80) 116/70      Pertinent Lab Results:     Results from last 7 days   Lab Units 09/16/21  1143 09/16/21  0633 09/15/21  1819   SODIUM mmol/L 138 139 138   POTASSIUM mmol/L 4.7 4.6 4.6   CHLORIDE mmol/L 105 105 102   CO2 mmol/L 24.6 25.5 27.0   BUN mg/dL 14 13 14   CREATININE mg/dL 0.89 0.95 1.10   CALCIUM mg/dL 9.0 9.0 9.5   BILIRUBIN mg/dL  --   --  0.3   ALK PHOS U/L  --   --  80   ALT (SGPT) U/L  --   --  36   AST (SGOT) U/L  --   --  50*   GLUCOSE mg/dL 128* 123* 150*     Results from last 7 days   Lab Units 09/16/21  1144 09/16/21  0633 09/15/21  1819   WBC 10*3/mm3 10.70 11.09* 10.28   HEMOGLOBIN g/dL 13.8 13.8 14.7   HEMATOCRIT % 42.1 42.8 45.0   PLATELETS 10*3/mm3 271 272  315         Results from last 7 days   Lab Units 09/15/21  2137 09/15/21  1819   TROPONIN T ng/mL 0.133* 0.116*                           Pertinent Radiology Results:    Imaging Results (All)     Procedure Component Value Units Date/Time    XR Chest 1 View [[id number]] Collected: 09/16/21 1027     Updated: 09/16/21 1139    Narrative:      PROCEDURE: XR CHEST 1 VW-        HISTORY: Chest Pain Triage Protocol     COMPARISON: None.     FINDINGS: The heart is normal in size. The mediastinum is unremarkable.  There is interstitial edema. There is no pneumothorax. There are no  acute osseous abnormalities.       Impression:      Interstitial edema.                       Images were reviewed, interpreted, and dictated by Dr. Sofi Rubio M.D.  Transcribed by Sarah Gibson PA-C.     This report was finalized on 9/16/2021 11:37 AM by Sofi Rubio M.D..    CT Chest Pulmonary Embolism [[id number]] Collected: 09/15/21 2326     Updated: 09/15/21 2327    Narrative:      FINAL REPORT    TECHNIQUE:  Thin section axial images were obtained through the chest and  during the arterial phase of IV contrast administration. Coronal  3-D MIP reconstructed images were also provided.    CLINICAL HISTORY:  chest pain, elevated d-dimer.  soa    FINDINGS:  The pulmonary arteries are well-opacified and there is no  filling defect to indicate pulmonary embolism. The thoracic  aorta not well opacified, but no abnormality is seen.  There is  mild-to-moderate emphysema.  There is marked interlobular septal  thickening in lung bases consistent with interstitial pulmonary  edema.  There are small bilateral pleural effusions.      Impression:      No pulmonary embolism.  Small pleural effusions and interstitial  pulmonary edema.    Authenticated by Lai De Los Santos M.D. on 09/15/2021 11:26:06 PM          Echo:    Results for orders placed in visit on 08/16/21    Adult Transthoracic Echo Complete W/ Cont if Necessary Per  Protocol    Interpretation Summary  · The left ventricular cavity is moderately dilated.  · The findings are consistent with dilated cardiomyopathy.  · Estimated left ventricular EF = 32% Left ventricular ejection fraction appears to be 31 - 35%. Left ventricular systolic function is moderately decreased.  · Left ventricular diastolic function is consistent with (grade III w/high LAP) reversible restrictive pattern.  · Left atrial volume is moderately increased.  · Mild aortic valve stenosis is present.  · There is mainly affecting the non-coronary, left coronary and right coronary cusp(s).    Condition on Discharge:      Stable.    Code status during the hospital stay:    Code Status and Medical Interventions:   Ordered at: 09/15/21 7558     Code Status:    CPR     Medical Interventions (Level of Support Prior to Arrest):    Full     Discharge Disposition:    LEFT AMA    Discharge Medications:       Discharge Medications      ASK your doctor about these medications      Instructions Start Date   carvedilol 3.125 MG tablet  Commonly known as: COREG   3.125 mg, Oral, 2 Times Daily      Entresto 24-26 MG tablet  Generic drug: sacubitril-valsartan   1 tablet, Oral, 2 Times Daily      varenicline 0.5 MG X 11 & 1 MG X 42 tablet  Commonly known as: CHANTIX ROLAND   Take 0.5 mg po daily x 3 days, then 0.5 mg po bid x 4 days, then 1 mg po bid      varenicline 1 MG tablet  Commonly known as: Chantix Continuing Month Roland   1 mg, Oral, 2 Times Daily   Start Date: October 8, 2021               Nhung Henry DO  09/17/21  07:54 EDT    Time: I spent 21 minutes on this discharge activity which included: face-to-face encounter with the patient, reviewing the data in the system, coordination of the care with the nursing staff as well as consultants, documentation, and entering orders.     Dictated utilizing Dragon dictation.

## 2021-09-20 ENCOUNTER — TRANSITIONAL CARE MANAGEMENT TELEPHONE ENCOUNTER (OUTPATIENT)
Dept: CALL CENTER | Facility: HOSPITAL | Age: 61
End: 2021-09-20

## 2021-09-20 NOTE — OUTREACH NOTE
Call Center TCM Note      Responses   [facility] patient discharged from?  [facility]   Does the patient have one of the following disease processes/diagnoses(primary or secondary)?  Acute MI (STEMI,NSTEMI)   TCM attempt successful?  No   Unsuccessful attempts  Attempt 2 [Patient left AMA]          Justin Su RN    9/20/2021, 10:41 EDT

## 2021-09-20 NOTE — OUTREACH NOTE
Call Center TCM Note      Responses   [facility] patient discharged from?  [facility]   Does the patient have one of the following disease processes/diagnoses(primary or secondary)?  Acute MI (STEMI,NSTEMI)   TCM attempt successful?  No   Unsuccessful attempts  Attempt 1          Justin Su RN    9/20/2021, 10:11 EDT

## 2021-09-21 ENCOUNTER — TRANSITIONAL CARE MANAGEMENT TELEPHONE ENCOUNTER (OUTPATIENT)
Dept: CALL CENTER | Facility: HOSPITAL | Age: 61
End: 2021-09-21

## 2021-09-21 ENCOUNTER — OFFICE VISIT (OUTPATIENT)
Dept: INTERNAL MEDICINE | Facility: CLINIC | Age: 61
End: 2021-09-21

## 2021-09-21 VITALS
WEIGHT: 167.8 LBS | DIASTOLIC BLOOD PRESSURE: 58 MMHG | RESPIRATION RATE: 16 BRPM | HEART RATE: 68 BPM | BODY MASS INDEX: 25.43 KG/M2 | HEIGHT: 68 IN | SYSTOLIC BLOOD PRESSURE: 138 MMHG | OXYGEN SATURATION: 98 % | TEMPERATURE: 97.6 F

## 2021-09-21 DIAGNOSIS — I10 ESSENTIAL HYPERTENSION: ICD-10-CM

## 2021-09-21 DIAGNOSIS — Z71.85 IMMUNIZATION COUNSELING: ICD-10-CM

## 2021-09-21 DIAGNOSIS — F41.0 PANIC ATTACKS: ICD-10-CM

## 2021-09-21 DIAGNOSIS — F17.200 TOBACCO DEPENDENCE: ICD-10-CM

## 2021-09-21 DIAGNOSIS — J43.2 CENTRILOBULAR EMPHYSEMA (HCC): ICD-10-CM

## 2021-09-21 DIAGNOSIS — I21.4 NSTEMI (NON-ST ELEVATED MYOCARDIAL INFARCTION) (HCC): Primary | ICD-10-CM

## 2021-09-21 DIAGNOSIS — R09.89 BRUIT OF RIGHT CAROTID ARTERY: ICD-10-CM

## 2021-09-21 DIAGNOSIS — R73.03 PRE-DIABETES: ICD-10-CM

## 2021-09-21 DIAGNOSIS — I50.22 CHRONIC SYSTOLIC CONGESTIVE HEART FAILURE (HCC): ICD-10-CM

## 2021-09-21 PROCEDURE — 93000 ELECTROCARDIOGRAM COMPLETE: CPT | Performed by: NURSE PRACTITIONER

## 2021-09-21 PROCEDURE — 90686 IIV4 VACC NO PRSV 0.5 ML IM: CPT | Performed by: NURSE PRACTITIONER

## 2021-09-21 PROCEDURE — 90732 PPSV23 VACC 2 YRS+ SUBQ/IM: CPT | Performed by: NURSE PRACTITIONER

## 2021-09-21 PROCEDURE — 99496 TRANSJ CARE MGMT HIGH F2F 7D: CPT | Performed by: NURSE PRACTITIONER

## 2021-09-21 PROCEDURE — G0008 ADMIN INFLUENZA VIRUS VAC: HCPCS | Performed by: NURSE PRACTITIONER

## 2021-09-21 PROCEDURE — G0009 ADMIN PNEUMOCOCCAL VACCINE: HCPCS | Performed by: NURSE PRACTITIONER

## 2021-09-21 PROCEDURE — 1111F DSCHRG MED/CURRENT MED MERGE: CPT | Performed by: NURSE PRACTITIONER

## 2021-09-21 RX ORDER — POTASSIUM CHLORIDE 750 MG/1
10 TABLET, FILM COATED, EXTENDED RELEASE ORAL 2 TIMES DAILY
Qty: 14 TABLET | Refills: 0 | Status: SHIPPED | OUTPATIENT
Start: 2021-09-21 | End: 2021-09-28

## 2021-09-21 RX ORDER — HYDROXYZINE HYDROCHLORIDE 25 MG/1
25 TABLET, FILM COATED ORAL 3 TIMES DAILY PRN
Qty: 30 TABLET | Refills: 1 | Status: SHIPPED | OUTPATIENT
Start: 2021-09-21 | End: 2021-10-21

## 2021-09-21 RX ORDER — NICOTINE 21 MG/24HR
1 PATCH, TRANSDERMAL 24 HOURS TRANSDERMAL EVERY 24 HOURS
Qty: 28 EACH | Refills: 0 | Status: SHIPPED | OUTPATIENT
Start: 2021-09-21 | End: 2021-12-01

## 2021-09-21 RX ORDER — FUROSEMIDE 20 MG/1
20 TABLET ORAL DAILY
Qty: 7 TABLET | Refills: 0 | Status: SHIPPED | OUTPATIENT
Start: 2021-09-21 | End: 2021-10-21

## 2021-09-21 RX ORDER — ALBUTEROL SULFATE 90 UG/1
2 AEROSOL, METERED RESPIRATORY (INHALATION) EVERY 4 HOURS PRN
Qty: 6.7 G | Refills: 3 | Status: SHIPPED | OUTPATIENT
Start: 2021-09-21 | End: 2022-11-16

## 2021-09-21 NOTE — PROGRESS NOTES
Transitional Care Follow Up Visit  Subjective     Tristan Castle is a 61 y.o. male who presents for a transitional care management visit.    Within 48 business hours after discharge our office contacted him via telephone to coordinate his care and needs.      I reviewed and discussed the details of that call along with the discharge summary, hospital problems, inpatient lab results, inpatient diagnostic studies, and consultation reports with Tristan.     Current outpatient and discharge medications have been reconciled for the patient.  Reviewed by: JOSE Barcenas      Date of TCM Phone Call 9/17/2021   [facility]   Date of Admission 9/15/2021   Date of Discharge 9/17/2021   Discharge Disposition Left Against Medical Advice     Risk for Readmission (LACE) Score: 12 (9/17/2021  6:01 AM)      History of Present Illness   Course During Hospital Stay: Presented to [facility] ED on 9/15 with acute complaints of severe left-sided stabbing chest pain.  Troponins trended and elevated.  Established patient of Dr. Meyers with cardiology, was consulted and admitted to the hospital.  CTA was negative for pulmonary embolus, other labs are unremarkable, and he was hemodynamically stable.  He underwent left heart catheterization on 9/16 where he was found to have severe two-vessel coronary disease with successful catheter-based revascularization of the mid left circumflex stenosis utilizing overlapping drug-eluting stents, chronically occluded RCA did not have angiographic features to predict successful revascularization.  He was instructed by Dr. Meyers to take enteric-coated aspirin daily, Brilinta twice daily x12 months, and to stop smoking.  An outpatient referral to cardiac rehab was also ordered.  He was given instructions to be observed overnight on telemetry with a repeat EKG in the morning however he left AMA on 9/17 due to wanting to go to sleep.    He has had no further chest pain since leaving the hospital. He  continues to have severe anxiety and smothering feeling. He has not been able to get chantix due to cost. He is currently smoking 3-4 cigarettes per day. Wanting a second opinion from cardiology, not happy with current. Compliant with medication regimen and wearing life vest in office today. Wife and patient both concerned with recent CT findings of emphysema, he is a long term smoker. He also complains that he is retaining fluid, has gained 10 pounds in 5 days.  Denies any lower extremity swelling but continues to have shortness of breath on exertion and with lying down in addition to abdominal bloating.        ECG 12 Lead    Date/Time: 9/21/2021 1:37 PM  Performed by: Yen Wu APRN  Authorized by: Yen Wu APRN   Comparison: compared with previous ECG   Similar to previous ECG  Rhythm: sinus rhythm  Rate: normal  BPM: 70  Conduction: conduction normal  ST Segments: ST segments normal  QRS axis: normal  Other findings: non-specific ST-T wave changes and left atrial abnormality    Clinical impression: abnormal EKG          The following portions of the patient's history were reviewed and updated as appropriate: allergies, current medications, past family history, past medical history, past social history, past surgical history and problem list.    Review of Systems   Constitutional: Positive for fatigue. Negative for appetite change and fever.   HENT: Negative for sore throat and trouble swallowing.    Eyes: Negative for visual disturbance.   Respiratory: Positive for cough and shortness of breath. Negative for wheezing.    Cardiovascular: Positive for palpitations. Negative for chest pain and leg swelling.   Gastrointestinal: Positive for abdominal distention. Negative for abdominal pain, blood in stool, constipation, diarrhea, nausea and vomiting.   Endocrine: Negative for polydipsia, polyphagia and polyuria.   Genitourinary: Negative for dysuria.   Musculoskeletal: Positive for arthralgias.  Negative for back pain and myalgias.   Skin: Negative for rash.   Neurological: Positive for dizziness and headaches. Negative for weakness.   Psychiatric/Behavioral: Negative for sleep disturbance and suicidal ideas. The patient is not nervous/anxious.        Objective   Physical Exam  Vitals and nursing note reviewed.   Constitutional:       General: He is not in acute distress.     Appearance: Normal appearance. He is ill-appearing (Chronically ill). He is not toxic-appearing.   Eyes:      Pupils: Pupils are equal, round, and reactive to light.   Neck:      Vascular: Carotid bruit (Right) present.   Cardiovascular:      Rate and Rhythm: Normal rate and regular rhythm.      Heart sounds: Normal heart sounds. No murmur heard.        Comments: LifeVest in place  Pulmonary:      Effort: Pulmonary effort is normal. No respiratory distress.      Breath sounds: Rales (Fine rales in lower bases bilaterally) present. No wheezing.   Abdominal:      General: Bowel sounds are normal. There is no distension.      Palpations: Abdomen is soft.      Tenderness: There is no abdominal tenderness.   Musculoskeletal:      Cervical back: Neck supple. No tenderness.      Right lower leg: No edema.      Left lower leg: No edema.   Skin:     General: Skin is warm and dry.      Findings: No rash.   Neurological:      General: No focal deficit present.      Mental Status: He is alert.   Psychiatric:         Mood and Affect: Mood normal.         Behavior: Behavior normal.         Assessment/Plan   Diagnoses and all orders for this visit:    1. NSTEMI (non-ST elevated myocardial infarction) (CMS/[facility]) (Primary)  -     Ambulatory Referral to Cardiology  -     ECG 12 Lead  Requesting second opinion from cardiology, new referral placed today.  Repeat EKG performed and similar to previous.  Continue LifeVest.  ER with any chest pain.  Encouraged compliance with aspirin, Brilinta, Entresto, and Lipitor.  2. Essential hypertension  -      Ambulatory Referral to Cardiology  -     ECG 12 Lead  Elevated on exam today.  Will hold on adjusting medication regimen at this time as I am placing him on Lasix short course.  Recommend DASH diet, medication compliance, and exercise as tolerated.  3. Chronic systolic congestive heart failure (CMS/HCC)  -     Ambulatory Referral to Cardiology  -     ECG 12 Lead  Due to weight gain and abdominal bloating will provide 7-day course of Lasix with potassium replacement.  Advised to stay hydrated with clear decaffeinated fluids.  Continue Entresto and beta-blockade therapy.  Referral to cardiology for second opinion given today.  Continue LifeVest.  4. Pre-diabetes  Lab Results   Component Value Date    HGBA1C 6.40 (H) 09/16/2021     He will need initiation of Metformin, will hold at this visit due to increase in new medications.  Will initiate at next visit in 1 month.  Recommend dietary changes including avoid sugary drinks, decrease carbohydrates and sugar, decrease processed food intake, increase lean meats and green leafy vegetables, and moderate intensity exercise as tolerated.  5. Panic attacks  Having episodes of panic due to his shortness of breath, suspect may improve with Lasix course.  Will use hydroxyzine as needed, he has been advised on side effects of this medication including drowsiness and I have advised him to not drive or drink alcohol with this medication.  Recommend sleep hygiene, stress management, and healthy diet.  6. Immunization counseling  We discussed the risks and benefits of Pneumovax 23 (Pneumococcal), Flulaval (Flu 6+months-64 yrs).      Counseling was given to inform of the potential signs and symptoms of adverse effects and when to seek medical attention.      The CDC Vaccination Information Sheet (VIS) was given for review prior to administration.  A copy of the VIS was also offered.      7. Bruit of right carotid artery  Referring to cardiology, advised to keep follow-up.  8. Tobacco  dependence  Tristan Castle  reports that he has been smoking cigarettes. He has a 12.50 pack-year smoking history. He has never used smokeless tobacco.. I have educated him on the risk of diseases from using tobacco products such as cancer, COPD and heart disease.     I advised him to quit and he is willing to quit. We have discussed the following method/s for tobacco cessation:  Education Material Prescription Medicaiton.  Together we have set a quit date for 1 week from today.  He will follow up with me in 1 month or sooner to check on his progress.    I spent 5 minutes counseling the patient.    He has been advised on side effects of nicotine patches and instructed to not smoke  while using, he verbalizes understanding.  Was unable to afford Chantix and I do not think that Wellbutrin would be helpful due to his current anxiety state.    9. Centrilobular emphysema (CMS/HCC)  -     Full Pulmonary Function Test With Bronchodilator; Future  We will perform PFTs to get baseline numbers.  Albuterol rescue inhaler provided today, after PFTs consider daily LABA for better control of symptoms.  Recommend tobacco cessation, currently trying with patches.  Other orders  -     furosemide (Lasix) 20 MG tablet; Take 1 tablet by mouth Daily for 7 days.  Dispense: 7 tablet; Refill: 0  -     potassium chloride 10 MEQ CR tablet; Take 1 tablet by mouth 2 (Two) Times a Day for 7 days.  Dispense: 14 tablet; Refill: 0  -     nicotine (NICODERM CQ) 14 MG/24HR patch; Place 1 patch on the skin as directed by provider Daily.  Dispense: 28 each; Refill: 0  -     albuterol sulfate  (90 Base) MCG/ACT inhaler; Inhale 2 puffs Every 4 (Four) Hours As Needed for Wheezing or Shortness of Air.  Dispense: 6.7 g; Refill: 3  -     Pneumococcal Polysaccharide Vaccine 23-Valent (PPSV23) Greater Than or Equal To 3yo Subcutaneous / IM  -     FluLaval/Fluarix (VFC) >6 Months (1403-8125)  -     hydrOXYzine (ATARAX) 25 MG tablet; Take 1 tablet by mouth  3 (Three) Times a Day As Needed for Anxiety.  Dispense: 30 tablet; Refill: 1

## 2021-09-21 NOTE — OUTREACH NOTE
Call Center TCM Note      Responses   [facility] patient discharged from?  [facility]   Does the patient have one of the following disease processes/diagnoses(primary or secondary)?  Acute MI (STEMI,NSTEMI)   TCM attempt successful?  No   Unsuccessful attempts  Attempt 3          Justin Su RN    9/21/2021, 11:41 EDT

## 2021-09-22 ENCOUNTER — TELEPHONE (OUTPATIENT)
Dept: INTERNAL MEDICINE | Facility: CLINIC | Age: 61
End: 2021-09-22

## 2021-09-22 NOTE — TELEPHONE ENCOUNTER
Called spoke to Jay the patient's wife she is aware that hydroxyzine has been called into the pharmacy. That he needed to give it a little longer than 24 hours to work. She stated she will give it a couple of days.

## 2021-09-22 NOTE — TELEPHONE ENCOUNTER
Caller: Jay Castle    Relationship: Emergency Contact    Best call back number: [phone]     What was the call regarding:PATIENT'S WIFE CALLED STATING THAT THE PATIENT IS HAVING PANIC ATTACKS.  JAY ASKED IF THERE IS SOMETHING SHE CAN DO TO HELP. PATIENT IS NOT RESTING.     Do you require a callback: YES

## 2021-09-24 ENCOUNTER — TELEPHONE (OUTPATIENT)
Dept: INTERNAL MEDICINE | Facility: CLINIC | Age: 61
End: 2021-09-24

## 2021-09-24 RX ORDER — BUSPIRONE HYDROCHLORIDE 5 MG/1
5 TABLET ORAL 3 TIMES DAILY
Qty: 90 TABLET | Refills: 1 | Status: SHIPPED | OUTPATIENT
Start: 2021-09-24 | End: 2021-10-21

## 2021-09-24 NOTE — TELEPHONE ENCOUNTER
Called spoke to Jay she is aware that we can try buspar she would like for that to be called in she is aware that these types of medications can pritesh 4-6 weeks to kick in.

## 2021-09-24 NOTE — TELEPHONE ENCOUNTER
Caller: Jay Castle    Relationship: Emergency Contact    Best call back number: [phone]    What is the best time to reach you: AS SOON AS POSSIBLE     Who are you requesting to speak with (clinical staff, provider,  specific staff member): NURSE         What was the call regarding: THE PATIENTS WIFE STATES THAT THE PATIENT WAS SEEN BY [facility] ON 09/21/2021 AND WAS PRESCRIBED A MEDICATION FOR ANXIETY THE PATIENTS WIFE STATES THAT THE PATIENT FEEL THAT THE MEDICATION IS NOT HELP SHE STATES THAT THE PATIENT IS NOT RESTING BECAUSE HIS ANXIETY IS WORSE IN THE EVENINGS AND AT NIGHT SHE STATES THAT DURING THE ANXIETY EPISODES THE PATIENT FEELS THAT HE CAN CATCH HIS BREATH. THE PATIENTS WIFE WOULD ALSO LIKE TO KNOW IF THE PATIENTS REFERRAL TO CARDIOLOGY HAS BEEN COMPLETED YET. PLEASE CALL PATIENT OR PATIENTS WIFE TO DISCUSS    Do you require a callback: YES

## 2021-10-11 ENCOUNTER — APPOINTMENT (OUTPATIENT)
Dept: PULMONOLOGY | Facility: HOSPITAL | Age: 61
End: 2021-10-11

## 2021-10-21 ENCOUNTER — OFFICE VISIT (OUTPATIENT)
Dept: INTERNAL MEDICINE | Facility: CLINIC | Age: 61
End: 2021-10-21

## 2021-10-21 VITALS
DIASTOLIC BLOOD PRESSURE: 78 MMHG | BODY MASS INDEX: 23.36 KG/M2 | WEIGHT: 154.12 LBS | HEART RATE: 83 BPM | RESPIRATION RATE: 16 BRPM | OXYGEN SATURATION: 96 % | HEIGHT: 68 IN | TEMPERATURE: 97.3 F | SYSTOLIC BLOOD PRESSURE: 120 MMHG

## 2021-10-21 DIAGNOSIS — I21.4 NSTEMI (NON-ST ELEVATED MYOCARDIAL INFARCTION) (HCC): ICD-10-CM

## 2021-10-21 DIAGNOSIS — R73.03 PRE-DIABETES: ICD-10-CM

## 2021-10-21 DIAGNOSIS — Z72.0 TOBACCO ABUSE: ICD-10-CM

## 2021-10-21 DIAGNOSIS — I10 ESSENTIAL HYPERTENSION: Primary | ICD-10-CM

## 2021-10-21 DIAGNOSIS — R94.30 EJECTION FRACTION < 50%: ICD-10-CM

## 2021-10-21 DIAGNOSIS — I50.22 CHRONIC SYSTOLIC CONGESTIVE HEART FAILURE (HCC): ICD-10-CM

## 2021-10-21 PROBLEM — R73.01 ABNORMAL FASTING GLUCOSE: Status: ACTIVE | Noted: 2021-10-21

## 2021-10-21 PROBLEM — F41.9 ANXIETY DISORDER: Status: ACTIVE | Noted: 2021-10-21

## 2021-10-21 PROBLEM — E78.2 COMBINED FAT AND CARBOHYDRATE INDUCED HYPERLIPEMIA: Status: ACTIVE | Noted: 2021-10-21

## 2021-10-21 PROBLEM — M54.50 LBP (LOW BACK PAIN): Status: ACTIVE | Noted: 2021-10-21

## 2021-10-21 PROBLEM — G89.4 CHRONIC PAIN ASSOCIATED WITH SIGNIFICANT PSYCHOSOCIAL DYSFUNCTION: Status: ACTIVE | Noted: 2021-10-21

## 2021-10-21 PROBLEM — E55.9 AVITAMINOSIS D: Status: ACTIVE | Noted: 2021-10-21

## 2021-10-21 PROBLEM — R53.83 FATIGUE: Status: ACTIVE | Noted: 2021-10-21

## 2021-10-21 PROBLEM — M79.2 NERVE PAIN: Status: ACTIVE | Noted: 2021-10-21

## 2021-10-21 PROBLEM — F33.9 DEPRESSION, MAJOR, RECURRENT: Status: ACTIVE | Noted: 2021-10-21

## 2021-10-21 PROCEDURE — 99214 OFFICE O/P EST MOD 30 MIN: CPT | Performed by: NURSE PRACTITIONER

## 2021-10-21 NOTE — PROGRESS NOTES
Office Visit      Patient Name: Tristan Castle  : 1960   MRN: [id number]   Care Team: Patient Care Team:  Marybel Fleming MD as PCP - General (Internal Medicine)    Chief Complaint  Hypertension and Congestive Heart Failure    Subjective     Subjective      Tristan Castle presents to [facility] for follow-up of hypertension and CHF.   HTN- home blood  Pressure readings averaging 130-140/80.  Taking entresto and coreg as prescribed. He has stopped lasix and has had no further lower extremity swelling, abdominal swelling, and chest pain. Overall he is feeling better.   CHF- has upcoming appointment with cardiology on . He has stopped wearing his life vest because it was interrupting his sleep and did not see the need. Compliant with medication as above. Weighs himself everyday, has lost 13 pounds since previous visit.  EF 32%  Anxiety has improved, stopped his medications.   NSTEMI- never heard from cardiac rehab, compliant with dual antiplatelet therapy with brilinta and aspirin. No further chest pain. He is getting some exercise at home through yard work and walking.   Still exhibits shortness of breath on exertion. Still smoking less than 5 cigarettes per day, cutting back and trying to quit on his own.   Lab Results   Component Value Date    HGBA1C 6.40 (H) 2021   He was informed of pre-diabetes last visit. He has cut down on his soda, now only drinking 1 per day and has increased his water intake. He has also successfully cut down on his carbohydrates and sugar in his diet.    Refuses COVID booster today.     Review of Systems   Constitutional: Negative for fatigue, fever, unexpected weight gain and unexpected weight loss.   HENT: Negative for sore throat and trouble swallowing.    Eyes: Negative for visual disturbance.   Respiratory: Positive for shortness of breath (on exertion). Negative for cough and wheezing.    Cardiovascular: Negative for chest pain, palpitations  "and leg swelling.   Gastrointestinal: Negative for abdominal pain, blood in stool, constipation, diarrhea and nausea.   Endocrine: Negative for polydipsia, polyphagia and polyuria.   Musculoskeletal: Negative for arthralgias, back pain and myalgias.   Skin: Negative for rash.   Neurological: Negative for dizziness, weakness, light-headedness and headache.   Psychiatric/Behavioral: Negative for sleep disturbance and depressed mood. The patient is not nervous/anxious.        Objective     Objective   Vital Signs:   /78   Pulse 83   Temp 97.3 °F (36.3 °C) (Temporal)   Resp 16   Ht 172.7 cm (68\")   Wt 69.9 kg (154 lb 1.9 oz)   SpO2 96%   BMI 23.43 kg/m²     Physical Exam  Vitals and nursing note reviewed.   Constitutional:       General: He is not in acute distress.     Appearance: Normal appearance. He is ill-appearing (Chronically ill-appearing). He is not toxic-appearing.   Eyes:      Pupils: Pupils are equal, round, and reactive to light.   Neck:      Vascular: Carotid bruit (Right) present.   Cardiovascular:      Rate and Rhythm: Normal rate and regular rhythm.      Heart sounds: Normal heart sounds. No murmur heard.      Pulmonary:      Effort: Pulmonary effort is normal. No respiratory distress.      Breath sounds: Normal breath sounds. No wheezing.   Abdominal:      General: Bowel sounds are normal. There is no distension.      Palpations: Abdomen is soft.      Tenderness: There is no abdominal tenderness.   Musculoskeletal:      Cervical back: Neck supple. No tenderness.   Skin:     General: Skin is warm and dry.      Findings: No rash.   Neurological:      General: No focal deficit present.      Mental Status: He is alert.   Psychiatric:         Mood and Affect: Mood normal.         Behavior: Behavior normal.          Assessment / Plan      Assessment/Plan   Problem List Items Addressed This Visit        Cardiac and Vasculature    Chronic systolic congestive heart failure (HCC)    Acute " exacerbation resolved.  Continue Entresto, Coreg, statin, and dual antiplatelet therapy.  I have reviewed his echocardiogram results with him and we discussed necessity of LifeVest to prevent sudden cardiac death, he verbalizes understanding and will wear from now on until he sees cardiology.  Keep follow-up with cardiology in 2 weeks.       Tobacco    Tobacco abuse    Urged smoking cessation.  Patches as needed, advised against smoking with patch on.      Other Visit Diagnoses     Essential hypertension    -  Primary    Controlled.  Continue Entresto and Coreg at current doses.  Recommend medication compliance, DASH diet, and participate in cardiac rehab.  No current order in for cardiac rehab, placed today.    NSTEMI (non-ST elevated myocardial infarction) ([facility])        Relevant Orders    Cardiac Rehab Evaluation    Follow-up with cardiology in 2 weeks.  Continue dual antiplatelet therapy and referral to cardiac rehab placed today.    Ejection fraction < 50%        Pre-diabetes        Losing weight with diet and participate in cardiac rehab.  Currently wants to avoid meds if possible.  Advised to follow a low carbohydrate and sugar diet, continue physical exercise as tolerated.  This will be reevaluated at his next visit.           Follow Up   Return in about 4 weeks (around 11/18/2021), or if symptoms worsen or fail to improve, for Medicare Wellness.  Patient was given instructions and counseling regarding his condition or for health maintenance advice. Please see specific information pulled into the AVS if appropriate.     JOSE Bradshaw  [facility]

## 2021-11-01 ENCOUNTER — HOSPITAL ENCOUNTER (OUTPATIENT)
Dept: PULMONOLOGY | Facility: HOSPITAL | Age: 61
Discharge: HOME | End: 2021-11-01
Admitting: NURSE PRACTITIONER

## 2021-11-01 DIAGNOSIS — J43.2 CENTRILOBULAR EMPHYSEMA (HCC): ICD-10-CM

## 2021-11-01 PROCEDURE — 94729 DIFFUSING CAPACITY: CPT

## 2021-11-01 PROCEDURE — 94729 DIFFUSING CAPACITY: CPT | Performed by: INTERNAL MEDICINE

## 2021-11-01 PROCEDURE — 94640 AIRWAY INHALATION TREATMENT: CPT

## 2021-11-01 PROCEDURE — A9270 NON-COVERED ITEM OR SERVICE: HCPCS | Performed by: NURSE PRACTITIONER

## 2021-11-01 PROCEDURE — 94726 PLETHYSMOGRAPHY LUNG VOLUMES: CPT | Performed by: INTERNAL MEDICINE

## 2021-11-01 PROCEDURE — 94726 PLETHYSMOGRAPHY LUNG VOLUMES: CPT

## 2021-11-01 PROCEDURE — 63710000001 ALBUTEROL SULFATE HFA 108 (90 BASE) MCG/ACT AEROSOL SOLUTION 8.5 G INHALER: Performed by: NURSE PRACTITIONER

## 2021-11-01 PROCEDURE — 94060 EVALUATION OF WHEEZING: CPT | Performed by: INTERNAL MEDICINE

## 2021-11-01 PROCEDURE — 94060 EVALUATION OF WHEEZING: CPT

## 2021-11-01 RX ORDER — ALBUTEROL SULFATE 90 UG/1
2 AEROSOL, METERED RESPIRATORY (INHALATION)
Status: DISCONTINUED | OUTPATIENT
Start: 2021-11-01 | End: 2021-11-02 | Stop reason: HOSPADM

## 2021-11-01 RX ADMIN — ALBUTEROL SULFATE 2 PUFF: 90 AEROSOL, METERED RESPIRATORY (INHALATION) at 13:49

## 2021-11-04 ENCOUNTER — OFFICE VISIT (OUTPATIENT)
Dept: CARDIOLOGY | Facility: CLINIC | Age: 61
End: 2021-11-04

## 2021-11-04 ENCOUNTER — APPOINTMENT (OUTPATIENT)
Dept: CARDIAC REHAB | Facility: HOSPITAL | Age: 61
End: 2021-11-04

## 2021-11-04 VITALS
DIASTOLIC BLOOD PRESSURE: 60 MMHG | WEIGHT: 154 LBS | HEIGHT: 68 IN | SYSTOLIC BLOOD PRESSURE: 100 MMHG | HEART RATE: 96 BPM | BODY MASS INDEX: 23.34 KG/M2 | RESPIRATION RATE: 18 BRPM | OXYGEN SATURATION: 100 %

## 2021-11-04 DIAGNOSIS — E78.5 HYPERLIPIDEMIA, UNSPECIFIED HYPERLIPIDEMIA TYPE: ICD-10-CM

## 2021-11-04 DIAGNOSIS — I25.810 CORONARY ARTERY DISEASE INVOLVING CORONARY BYPASS GRAFT OF NATIVE HEART WITHOUT ANGINA PECTORIS: Primary | ICD-10-CM

## 2021-11-04 DIAGNOSIS — I50.22 CHRONIC SYSTOLIC CONGESTIVE HEART FAILURE (HCC): ICD-10-CM

## 2021-11-04 DIAGNOSIS — I73.9 PVD (PERIPHERAL VASCULAR DISEASE) WITH CLAUDICATION (HCC): ICD-10-CM

## 2021-11-04 DIAGNOSIS — Z72.0 TOBACCO ABUSE: ICD-10-CM

## 2021-11-04 DIAGNOSIS — I10 BENIGN ESSENTIAL HTN: ICD-10-CM

## 2021-11-04 PROCEDURE — 99214 OFFICE O/P EST MOD 30 MIN: CPT | Performed by: INTERNAL MEDICINE

## 2021-11-04 RX ORDER — CLOPIDOGREL BISULFATE 75 MG/1
75 TABLET ORAL DAILY
Qty: 90 TABLET | Refills: 3 | Status: SHIPPED | OUTPATIENT
Start: 2021-11-04 | End: 2022-11-01

## 2021-11-04 NOTE — PROGRESS NOTES
[facility] Cardiology Office Follow Up Note    Tristan Castle  [id number]  2021    Primary Care Provider: Marybel Fleming MD    Chief Complaint: Hospital follow-up    History of Present Illness:   Mr. Tristan Castle is a 61 y.o. male who presents to the Cardiology Clinic for follow up of coronary artery disease.  The patient has a past medical history significant for hypertension, hyperlipidemia, and chronic tobacco use.  He has a past cardiac history significant for presentation with an NSTEMI in .  At that time, he was found to have severe two-vessel coronary artery disease with severe stenosis in the mid LCx as well as chronic total occlusion of the proximal RCA.  He underwent PCI to the LCx with placement of overlapping JAXSON.  An echocardiogram showed dilated ischemic cardiomyopathy with an LVEF 30-35% as well as mild aortic stenosis.  He returns today for follow-up.  Since discharge, the patient reports he has been doing well.  His initial presenting anginal symptom was dyspnea.  Following PCI, he reports resolution of his exertional dyspnea.  He currently denies chest pain or exertional anginal symptoms.  No orthopnea, PND, or lower extremity swelling.  The patient reports that due to the high cost of Brilinta he self discontinued the medication several days ago.  He has continued with aspirin 81 mg daily.  Today, the patient is also reports bilateral lower extremity discomfort with ambulation.  He reports that he has a history of neuropathy, and was previously taking gabapentin which did improve his discomfort.  He denies any lower extremity wounds or ulcerations.  No other specific complaints today.    Past Cardiac Testin. Last Coronary Angio: 2021   1.  Severe stenosis of the mid LCx treated with overlapping JAXSON (3.0 x 38 mm Xience JAXSON, 3.5 x 23 mm JAXSON)    2.  Chronic total occlusion of the proximal RCA  2. Prior Stress Testing: None  3. Last Echo: 2021   1.  LVEF  30-35%   2.  Moderate left ventricular dilation   3.  Mild aortic stenosis  4. Prior Holter Monitor: None    Review of Systems:   Review of Systems   Constitutional: Negative for activity change, appetite change, chills, diaphoresis, fatigue, fever, unexpected weight gain and unexpected weight loss.   Eyes: Negative for blurred vision and double vision.   Respiratory: Negative for cough, chest tightness, shortness of breath and wheezing.    Cardiovascular: Negative for chest pain, palpitations and leg swelling.   Gastrointestinal: Negative for abdominal pain, anal bleeding, blood in stool and GERD.   Endocrine: Negative for cold intolerance and heat intolerance.   Genitourinary: Negative for hematuria.   Musculoskeletal:        Bilateral lower extremity discomfort   Neurological: Negative for dizziness, syncope, weakness and light-headedness.   Hematological: Does not bruise/bleed easily.   Psychiatric/Behavioral: Negative for depressed mood and stress. The patient is not nervous/anxious.        I have reviewed and/or updated the patient's past medical, past surgical, family, social history, problem list and allergies as appropriate.     Medications:     Current Outpatient Medications:   •  albuterol sulfate  (90 Base) MCG/ACT inhaler, Inhale 2 puffs Every 4 (Four) Hours As Needed for Wheezing or Shortness of Air., Disp: 6.7 g, Rfl: 3  •  aspirin (aspirin) 81 MG EC tablet, Take 1 tablet by mouth Daily., Disp: 90 tablet, Rfl: 3  •  atorvastatin (LIPITOR) 80 MG tablet, Take 1 tablet by mouth Daily., Disp: 90 tablet, Rfl: 3  •  carvedilol (COREG) 6.25 MG tablet, Take 1 tablet by mouth 2 (Two) Times a Day., Disp: 180 tablet, Rfl: 3  •  sacubitril-valsartan (Entresto) 24-26 MG tablet, Take 1 tablet by mouth 2 (Two) Times a Day., Disp: 60 tablet, Rfl: 11  •  clopidogrel (PLAVIX) 75 MG tablet, Take 1 tablet by mouth Daily., Disp: 90 tablet, Rfl: 3  •  nicotine (NICODERM CQ) 14 MG/24HR patch, Place 1 patch on the  "skin as directed by provider Daily., Disp: 28 each, Rfl: 0    Physical Exam:  Vital Signs:   Vitals:    11/04/21 1301   BP: 100/60   BP Location: Right arm   Patient Position: Sitting   Cuff Size: Adult   Pulse: 96   Resp: 18   SpO2: 100%   Weight: 69.9 kg (154 lb)   Height: 172.7 cm (67.99\")       Physical Exam  Constitutional:       General: He is not in acute distress.     Appearance: Normal appearance. He is well-developed. He is not diaphoretic.   HENT:      Head: Normocephalic and atraumatic.   Eyes:      General: No scleral icterus.     Pupils: Pupils are equal, round, and reactive to light.   Neck:      Trachea: No tracheal deviation.   Cardiovascular:      Rate and Rhythm: Normal rate and regular rhythm.      Heart sounds: Normal heart sounds. No murmur heard.  No friction rub. No gallop.       Comments: Normal JVD.  1+ bilateral lower extremity DP and AT pulses.  2+ right radial pulse.  Pulmonary:      Effort: Pulmonary effort is normal. No respiratory distress.      Breath sounds: Normal breath sounds. No stridor. No wheezing or rales.   Chest:      Chest wall: No tenderness.   Abdominal:      General: Bowel sounds are normal. There is no distension.      Palpations: Abdomen is soft.      Tenderness: There is no abdominal tenderness. There is no guarding or rebound.   Musculoskeletal:         General: No swelling. Normal range of motion.      Cervical back: Neck supple. No tenderness.   Lymphadenopathy:      Cervical: No cervical adenopathy.   Skin:     General: Skin is warm and dry.      Findings: No erythema.   Neurological:      General: No focal deficit present.      Mental Status: He is alert and oriented to person, place, and time.   Psychiatric:         Mood and Affect: Mood normal.         Behavior: Behavior normal.         Results Review:   I reviewed the patient's new clinical results.      Assessment / Plan:     1.  Severe two-vessel coronary artery disease  --Presented with an NSTEMI in 9/21, " found to have  of the proximal RCA and underwent PCI x2 to the mid LCx  --Currently doing well without chest pain or exertional anginal symptoms  --Self discontinued Brilinta due to high cost, will load Plavix 600 mg x 1 today, 75 mg thereafter  --Discussed the importance of continuation of DAPT for at least 1 year following PCI  --Continue high intensity statin  --Follow-up in 3 months, sooner if required    2.  Ischemic cardiomyopathy  --Moderate left ventricular dilation with moderate reduced LVEF 30-35%  --Currently appears well compensated and euvolemic on exam, NYHA class I  --Continue carvedilol  --Continue Entresto, defer up titration today due to mild hypotension    3.  Hypertension  --Currently well controlled    4.  Hyperlipidemia  --On high intensity statin    5.  Chronic tobacco use  --Complete cessation advised    6.  Bilateral lower extremity pain  --Possibly secondary to neuropathy  --Given some symptoms concerning for claudication, will obtain ABIs      Follow Up:   Return in about 3 months (around 2/4/2022).      Thank you for allowing me to participate in the care of your patient. Please to not hesitate to contact me with additional questions or concerns.     FRANCES Mckinley MD  Interventional Cardiology   11/04/2021  13:00 EDT

## 2021-11-05 ENCOUNTER — TELEPHONE (OUTPATIENT)
Dept: CARDIOLOGY | Facility: CLINIC | Age: 61
End: 2021-11-05

## 2021-11-05 NOTE — TELEPHONE ENCOUNTER
Patient was seen yesterday. Patient states that his BP is running around 83,60. Patient states he is having some dizziness upon standing.

## 2021-11-15 ENCOUNTER — HOSPITAL ENCOUNTER (OUTPATIENT)
Dept: ULTRASOUND IMAGING | Facility: HOSPITAL | Age: 61
Discharge: HOME | End: 2021-11-15
Admitting: INTERNAL MEDICINE

## 2021-11-15 DIAGNOSIS — I73.9 PVD (PERIPHERAL VASCULAR DISEASE) WITH CLAUDICATION (HCC): ICD-10-CM

## 2021-11-15 PROCEDURE — 93923 UPR/LXTR ART STDY 3+ LVLS: CPT

## 2021-11-16 DIAGNOSIS — I73.9 PAD (PERIPHERAL ARTERY DISEASE) (HCC): Primary | ICD-10-CM

## 2021-11-16 DIAGNOSIS — I70.212 ATHEROSCLER OF NATIVE ARTERY OF LEFT LEG WITH INTERMIT CLAUDICATION (HCC): ICD-10-CM

## 2021-11-26 ENCOUNTER — HOSPITAL ENCOUNTER (OUTPATIENT)
Dept: CT IMAGING | Facility: HOSPITAL | Age: 61
Discharge: HOME | End: 2021-11-26
Admitting: INTERNAL MEDICINE

## 2021-11-26 DIAGNOSIS — I73.9 PAD (PERIPHERAL ARTERY DISEASE) (HCC): ICD-10-CM

## 2021-11-26 DIAGNOSIS — I70.212 ATHEROSCLER OF NATIVE ARTERY OF LEFT LEG WITH INTERMIT CLAUDICATION (HCC): ICD-10-CM

## 2021-11-26 LAB — CREAT BLDA-MCNC: 1 MG/DL (ref 0.6–1.3)

## 2021-11-26 PROCEDURE — 82565 ASSAY OF CREATININE: CPT

## 2021-11-26 PROCEDURE — 75635 CT ANGIO ABDOMINAL ARTERIES: CPT

## 2021-11-26 PROCEDURE — 25010000002 IOPAMIDOL 61 % SOLUTION: Performed by: INTERNAL MEDICINE

## 2021-11-26 RX ADMIN — IOPAMIDOL 100 ML: 612 INJECTION, SOLUTION INTRAVENOUS at 13:47

## 2021-12-01 ENCOUNTER — OFFICE VISIT (OUTPATIENT)
Dept: INTERNAL MEDICINE | Facility: CLINIC | Age: 61
End: 2021-12-01

## 2021-12-01 VITALS
DIASTOLIC BLOOD PRESSURE: 73 MMHG | HEART RATE: 67 BPM | BODY MASS INDEX: 23.64 KG/M2 | WEIGHT: 156 LBS | HEIGHT: 68 IN | RESPIRATION RATE: 16 BRPM | SYSTOLIC BLOOD PRESSURE: 143 MMHG | OXYGEN SATURATION: 97 % | TEMPERATURE: 97.1 F

## 2021-12-01 DIAGNOSIS — E11.65 TYPE 2 DIABETES MELLITUS WITH HYPERGLYCEMIA, WITHOUT LONG-TERM CURRENT USE OF INSULIN (HCC): ICD-10-CM

## 2021-12-01 DIAGNOSIS — E78.2 MIXED HYPERLIPIDEMIA: ICD-10-CM

## 2021-12-01 DIAGNOSIS — R63.4 WEIGHT LOSS: ICD-10-CM

## 2021-12-01 DIAGNOSIS — I10 BENIGN ESSENTIAL HYPERTENSION: Primary | ICD-10-CM

## 2021-12-01 PROCEDURE — 99214 OFFICE O/P EST MOD 30 MIN: CPT | Performed by: INTERNAL MEDICINE

## 2021-12-01 NOTE — PATIENT INSTRUCTIONS
MyPlate from USDA    MyPlate is an outline of a general healthy diet based on the 2010 Dietary Guidelines for Americans, from the U.S. Department of Agriculture (USDA). It sets guidelines for how much food you should eat from each food group based on your age, sex, and level of physical activity.  What are tips for following MyPlate?  To follow MyPlate recommendations:  · Eat a wide variety of fruits and vegetables, grains, and protein foods.  · Serve smaller portions and eat less food throughout the day.  · Limit portion sizes to avoid overeating.  · Enjoy your food.  · Get at least 150 minutes of exercise every week. This is about 30 minutes each day, 5 or more days per week.  It can be difficult to have every meal look like MyPlate. Think about MyPlate as eating guidelines for an entire day, rather than each individual meal.  Fruits and vegetables  · Make half of your plate fruits and vegetables.  · Eat many different colors of fruits and vegetables each day.  · For a 2,000 calorie daily food plan, eat:  ? 2½ cups of vegetables every day.  ? 2 cups of fruit every day.  · 1 cup is equal to:  ? 1 cup raw or cooked vegetables.  ? 1 cup raw fruit.  ? 1 medium-sized orange, apple, or banana.  ? 1 cup 100% fruit or vegetable juice.  ? 2 cups raw leafy greens, such as lettuce, spinach, or kale.  ? ½ cup dried fruit.  Grains  · One fourth of your plate should be grains.  · Make at least half of the grains you eat each day whole grains.  · For a 2,000 calorie daily food plan, eat 6 oz of grains every day.  · 1 oz is equal to:  ? 1 slice bread.  ? 1 cup cereal.  ? ½ cup cooked rice, cereal, or pasta.  Protein  · One fourth of your plate should be protein.  · Eat a wide variety of protein foods, including meat, poultry, fish, eggs, beans, nuts, and tofu.  · For a 2,000 calorie daily food plan, eat 5½ oz of protein every day.  · 1 oz is equal to:  ? 1 oz meat, poultry, or fish.  ? ¼ cup cooked beans.  ? 1 egg.  ? ½ oz nuts  or seeds.  ? 1 Tbsp peanut butter.  Dairy  · Drink fat-free or low-fat (1%) milk.  · Eat or drink dairy as a side to meals.  · For a 2,000 calorie daily food plan, eat or drink 3 cups of dairy every day.  · 1 cup is equal to:  ? 1 cup milk, yogurt, cottage cheese, or soy milk (soy beverage).  ? 2 oz processed cheese.  ? 1½ oz natural cheese.  Fats, oils, salt, and sugars  · Only small amounts of oils are recommended.  · Avoid foods that are high in calories and low in nutritional value (empty calories), like foods high in fat or added sugars.  · Choose foods that are low in salt (sodium). Choose foods that have less than 140 milligrams (mg) of sodium per serving.  · Drink water instead of sugary drinks. Drink enough water each day to keep your urine pale yellow.  Where to find support  · Work with your health care provider or a nutrition specialist (dietitian) to develop a customized eating plan that is right for you.  · Download an villa (mobile application) to help you track your daily food intake.  Where to find more information  · Go to ChooseMyPlate.gov for more information.  Summary  · MyPlate is a general guideline for healthy eating from the USDA. It is based on the 2010 Dietary Guidelines for Americans.  · In general, fruits and vegetables should take up ½ of your plate, grains should take up ¼ of your plate, and protein should take up ¼ of your plate.  This information is not intended to replace advice given to you by your health care provider. Make sure you discuss any questions you have with your health care provider.  Document Revised: 05/21/2020 Document Reviewed: 03/19/2018  Elsevier Patient Education © 2021 [employer]

## 2021-12-09 ENCOUNTER — APPOINTMENT (OUTPATIENT)
Dept: CT IMAGING | Facility: HOSPITAL | Age: 61
End: 2021-12-09

## 2021-12-10 ENCOUNTER — TELEPHONE (OUTPATIENT)
Dept: CARDIOLOGY | Facility: CLINIC | Age: 61
End: 2021-12-10

## 2021-12-10 NOTE — TELEPHONE ENCOUNTER
NAOMI;      Patient called regarding life vest.  States he didn't know he was supposed to be wearing it.  Patient had left hospital AMA after cardiac cath in September 21. States life vest was delivered to his home.  Also patient wanted to know about changing his appointment in February to a sooner date.  Due to Dr. Mckinley's schedule being full, patient is scheduled with Lilli GARCIA. Appointment will be to go over results of CT scan and Life Vest questions.

## 2021-12-16 ENCOUNTER — HOSPITAL ENCOUNTER (OUTPATIENT)
Dept: CT IMAGING | Facility: HOSPITAL | Age: 61
Discharge: HOME | End: 2021-12-16
Admitting: INTERNAL MEDICINE

## 2021-12-16 PROCEDURE — 74177 CT ABD & PELVIS W/CONTRAST: CPT

## 2021-12-16 PROCEDURE — 25010000002 IOPAMIDOL 61 % SOLUTION: Performed by: INTERNAL MEDICINE

## 2021-12-16 RX ADMIN — IOPAMIDOL 100 ML: 612 INJECTION, SOLUTION INTRAVENOUS at 09:18

## 2021-12-16 NOTE — PROGRESS NOTES
Fatty infiltration of the liver , arteries in legs may be  blocked and needs to keep cardiology appt

## 2021-12-28 ENCOUNTER — OFFICE VISIT (OUTPATIENT)
Dept: CARDIOLOGY | Facility: CLINIC | Age: 61
End: 2021-12-28

## 2021-12-28 VITALS
SYSTOLIC BLOOD PRESSURE: 122 MMHG | HEART RATE: 76 BPM | WEIGHT: 159 LBS | OXYGEN SATURATION: 96 % | BODY MASS INDEX: 24.1 KG/M2 | DIASTOLIC BLOOD PRESSURE: 72 MMHG | HEIGHT: 68 IN | RESPIRATION RATE: 20 BRPM

## 2021-12-28 DIAGNOSIS — I10 BENIGN ESSENTIAL HTN: ICD-10-CM

## 2021-12-28 DIAGNOSIS — I50.22 CHRONIC SYSTOLIC CONGESTIVE HEART FAILURE (HCC): ICD-10-CM

## 2021-12-28 DIAGNOSIS — J31.0 RHINITIS, UNSPECIFIED TYPE: ICD-10-CM

## 2021-12-28 DIAGNOSIS — J43.2 CENTRILOBULAR EMPHYSEMA (HCC): ICD-10-CM

## 2021-12-28 DIAGNOSIS — E78.5 HYPERLIPIDEMIA, UNSPECIFIED HYPERLIPIDEMIA TYPE: ICD-10-CM

## 2021-12-28 DIAGNOSIS — Z72.0 TOBACCO ABUSE: ICD-10-CM

## 2021-12-28 DIAGNOSIS — I25.810 CORONARY ARTERY DISEASE INVOLVING CORONARY BYPASS GRAFT OF NATIVE HEART WITHOUT ANGINA PECTORIS: Primary | ICD-10-CM

## 2021-12-28 PROCEDURE — 99214 OFFICE O/P EST MOD 30 MIN: CPT | Performed by: NURSE PRACTITIONER

## 2021-12-28 RX ORDER — FLUTICASONE PROPIONATE 50 MCG
2 SPRAY, SUSPENSION (ML) NASAL DAILY
Qty: 16 G | Refills: 6 | Status: SHIPPED | OUTPATIENT
Start: 2021-12-28 | End: 2022-11-16

## 2021-12-28 RX ORDER — CETIRIZINE HYDROCHLORIDE 10 MG/1
10 TABLET ORAL DAILY
Qty: 30 TABLET | Refills: 11 | Status: SHIPPED | OUTPATIENT
Start: 2021-12-28 | End: 2022-11-16

## 2021-12-28 RX ORDER — LISINOPRIL 10 MG/1
10 TABLET ORAL DAILY
Qty: 90 TABLET | Refills: 3 | Status: SHIPPED | OUTPATIENT
Start: 2021-12-28 | End: 2022-12-28

## 2021-12-28 NOTE — PROGRESS NOTES
[facility] Cardiology Office Follow Up Note    Tristan Castle  [id number]  12/28/2021    Primary Care Provider: Marybel Fleming MD   Referring Provider: No ref. provider found    Chief Complaint: LifeVest and CT follow-up    History of Present Illness:   Mr. Tristan Castle is a 61 y.o. male who presents to the Cardiology Clinic for follow to discuss LifeVest indications and the results of his CT abdomen/pelvis follow-up.  The patient has a past medical history significant for hypertension, hyperlipidemia, and chronic tobacco use.  He has a past cardiac history significant for presentation with an NSTEMI in 9/21.  At that time, he was found to have severe two-vessel coronary artery disease with severe stenosis in the mid LCx as well as chronic total occlusion of the proximal RCA.  He underwent PCI to the LCx with placement of overlapping JAXSON.  An echocardiogram showed dilated ischemic cardiomyopathy with an LVEF 30-35% as well as mild aortic stenosis.  At his last clinic visit, the patient is reported bilateral lower extremity discomfort with ambulation.  Abnormal ankle/brachial indices were followed by a CT abdominal angiogram with bilateral runoff which showed a total occlusion of the left common illiac artery and a 70% stenosis of right common iliac artery.  The patient presents today for follow-up.  The patient reports feeling well from a cardiac standpoint.  He specifically denies chest pain and dyspnea.  He denies palpitations, dizziness, and syncope.  He denies orthopnea and lower extremity edema.  He does, however, continue to report pain of bilateral lower extremities with ambulation.  He notes that the outer aspect of his left upper leg is the most painful, but adds that his legs have hurt since he was in his 20's.  He states that sometimes his toes feel cold, but he denies any mottling, erythema, color change of his lower extremities.  He denies the presence of wounds or ulcerations.  He  continues to smoke, but is down to 5 cigarettes per day.  He continues to take aspirin and Plavix without significant bruising or bleeding.  The patient reports that he has tried to wear his LifeVest, but states that it is incredibly uncomfortable and virtually impossible to sleep in.  He offers no other complaints or concerns at this time.      Past Cardiac Testin. Last Coronary Angio: 2021              1.  Severe stenosis of the mid LCx treated with overlapping JAXSON (3.0 x 38 mm Xience JAXSON, 3.5 x 23 mm JAXSON)               2.  Chronic total occlusion of the proximal RCA  2. Prior Stress Testing: None  3. Last Echo: 2021              1.  LVEF 30-35%              2.  Moderate left ventricular dilation              3.  Mild aortic stenosis  4. Prior Holter Monitor: None      Past Peripheral Testin. US Ankle/Brachial Indices: 11/15/2021   1. The right VIPUL measures 1.1.    2. The left VIPUL measures 0.5.   3. IMPRESSION:  Severely reduced ankle-brachial index on the left.  2. CT Angio Abdominal Aorta Bilateral Iliofem Runoff: 2021   1. Occlusion of the left common iliac artery with reconstitution of flow in the left external iliac artery via collateral circulation.   2. Calcified and soft plaque in the proximal right common iliac artery producing an approximately 70% stenosis.           Review of Systems:   Review of Systems   Constitutional: Negative for activity change, chills, diaphoresis, fatigue, fever and unexpected weight gain.   HENT: Positive for postnasal drip and rhinorrhea.    Eyes: Negative for blurred vision and visual disturbance.   Respiratory: Positive for cough. Negative for apnea, chest tightness, shortness of breath and wheezing.         Dry cough   Cardiovascular: Negative for chest pain, palpitations and leg swelling.   Gastrointestinal: Negative for abdominal distention, blood in stool, GERD and indigestion.   Endocrine: Negative for cold intolerance and heat intolerance.  "  Genitourinary: Negative for hematuria.   Musculoskeletal: Negative for gait problem, joint swelling and myalgias.        Left lateral leg pain with ambulation   Skin: Negative for color change, pallor and bruise.   Neurological: Negative for dizziness, seizures, syncope, weakness, light-headedness, numbness, headache and confusion.   Hematological: Does not bruise/bleed easily.   Psychiatric/Behavioral: Negative for behavioral problems, sleep disturbance, suicidal ideas and depressed mood.     I have reviewed and confirmed the accuracy of the ROS as documented by the MA/LPN/RN JOSE Ching    I have reviewed and/or updated the patient's past medical, past surgical, family, social history, problem list and allergies as appropriate.     Medications:     Current Outpatient Medications:   •  albuterol sulfate  (90 Base) MCG/ACT inhaler, Inhale 2 puffs Every 4 (Four) Hours As Needed for Wheezing or Shortness of Air., Disp: 6.7 g, Rfl: 3  •  aspirin (aspirin) 81 MG EC tablet, Take 1 tablet by mouth Daily., Disp: 90 tablet, Rfl: 3  •  atorvastatin (LIPITOR) 80 MG tablet, Take 1 tablet by mouth Daily., Disp: 90 tablet, Rfl: 3  •  carvedilol (COREG) 6.25 MG tablet, Take 1 tablet by mouth 2 (Two) Times a Day., Disp: 180 tablet, Rfl: 3  •  clopidogrel (PLAVIX) 75 MG tablet, Take 1 tablet by mouth Daily., Disp: 90 tablet, Rfl: 3  •  cetirizine (zyrTEC) 10 MG tablet, Take 1 tablet by mouth Daily., Disp: 30 tablet, Rfl: 11  •  fluticasone (Flonase) 50 MCG/ACT nasal spray, 2 sprays into the nostril(s) as directed by provider Daily., Disp: 16 g, Rfl: 6  •  lisinopril (PRINIVIL,ZESTRIL) 10 MG tablet, Take 1 tablet by mouth Daily., Disp: 90 tablet, Rfl: 3    Physical Exam:  Vital Signs:   Vitals:    12/28/21 1517   BP: 122/72   BP Location: Left arm   Patient Position: Sitting   Cuff Size: Adult   Pulse: 76   Resp: 20   SpO2: 96%   Weight: 72.1 kg (159 lb)   Height: 172.7 cm (67.99\")     Body mass index is 24.18 " kg/m².    Physical Exam  Vitals and nursing note reviewed.   Constitutional:       General: He is not in acute distress.     Appearance: Normal appearance. He is well-developed.   HENT:      Head: Normocephalic and atraumatic.   Eyes:      General: No scleral icterus.     Extraocular Movements: Extraocular movements intact.   Neck:      Trachea: Trachea normal.   Cardiovascular:      Rate and Rhythm: Normal rate and regular rhythm.      Pulses: Normal pulses.      Heart sounds: Normal heart sounds. No murmur heard.  No friction rub. No gallop.    Pulmonary:      Effort: Pulmonary effort is normal.      Breath sounds: Rhonchi present. No wheezing or rales.      Comments: Rhonchi in RLL  Abdominal:      Palpations: Abdomen is soft.      Tenderness: There is no abdominal tenderness.   Musculoskeletal:         General: Normal range of motion.      Cervical back: Neck supple.      Right lower leg: No edema.      Left lower leg: No edema.   Skin:     General: Skin is warm and dry.      Findings: No bruising, lesion or rash.   Neurological:      Mental Status: He is alert and oriented to person, place, and time.      Motor: No weakness.      Gait: Gait normal.   Psychiatric:         Mood and Affect: Mood normal.         Behavior: Behavior normal. Behavior is cooperative.         Thought Content: Thought content does not include suicidal ideation.         Results Review:   I reviewed the patient's new clinical results.      Assessment / Plan:     1. Coronary artery disease involving coronary bypass graft of native heart without angina pectoris (Primary)  --Presented with an NSTEMI in 9/21, found to have  of the proximal RCA and underwent PCI x2 to the mid LCx  --Currently without angina or exertional symptoms  --Continue aspirin and Plavix 1 year post-PCI, at least through 9/22  --Continue high intensity statin    2. Benign essential HTN  --Well controlled    3. Hyperlipidemia  --LDL goal< 70  --Continue high-intensity  statin    4. Ischemic cardiomyopathy  --9/1/21, Moderate left ventricular dilation with moderate reduced LVEF 31-35% per echocardiogram  --Well compensated and euvolemic on exam  --NYHA Functional Class I  --Continue carvedilol  --Unable to tolerate Entresto due to hypotension  --Start low-dose lisinopril and continue as tolerated  --REPEAT echocardiogram to reassess systolic function after 3+ months of guideline-directed medical therapy    5. Bilateral lower extremity pain  --Ongoing pain with ambulation  --11/21, Occlusion of left common iliac artery and 70% stenosis of right common iliac artery per CT scan  --Patient wishes to defer vascular surgery option at this time, but wishes to proceed with peripheral catheterization with percutaneous intervention  --Consult Dr. Mckinley to see when this procedure can be scheduled  --Continue aspirin, Plavix, and high-dose statin  --Encouraged patient to walk 20 minutes, 4 x's/day as tolerated to promote circulation  --Encouraged complete smoking cessation  --Follow-up to be determined    6. Centrilobular emphysema (HCC)  --Stable symptoms  --Complicated by ongoing tobacco use    7. Tobacco abuse  --Strongly advised complete smoking cessation    The patient usually goes to [place] with his wife for the months of January through March.  Ideally, he would like to have this procedure completed by the end of January, if possible.  I will consult Dr. Mckinley about this.  In the meantime, the patient has been taught the signs and symptoms of critical limb ischemia.    Preventative Cardiology:   Tobacco Cessation: Cessation Counseling Provided    Advance Care Planning: ACP discussion was held with the patient during this visit. Patient does not have an advance directive, information provided.     Follow Up:   To Be Determined    Thank you for allowing me to participate in the care of your patient. Please to not hesitate to contact me with additional questions or concerns.     Lilli CAI  JOSE Hassan

## 2022-01-10 ENCOUNTER — OFFICE VISIT (OUTPATIENT)
Dept: GASTROENTEROLOGY | Facility: CLINIC | Age: 62
End: 2022-01-10

## 2022-01-10 VITALS
DIASTOLIC BLOOD PRESSURE: 80 MMHG | RESPIRATION RATE: 16 BRPM | BODY MASS INDEX: 24.4 KG/M2 | WEIGHT: 161 LBS | TEMPERATURE: 98.4 F | HEART RATE: 108 BPM | HEIGHT: 68 IN | SYSTOLIC BLOOD PRESSURE: 128 MMHG

## 2022-01-10 DIAGNOSIS — R63.0 POOR APPETITE: Primary | ICD-10-CM

## 2022-01-10 DIAGNOSIS — K59.00 CONSTIPATION, UNSPECIFIED CONSTIPATION TYPE: ICD-10-CM

## 2022-01-10 DIAGNOSIS — R74.01 ELEVATED AST (SGOT): ICD-10-CM

## 2022-01-10 DIAGNOSIS — R63.4 WEIGHT LOSS, ABNORMAL: ICD-10-CM

## 2022-01-10 DIAGNOSIS — Z12.11 COLON CANCER SCREENING: ICD-10-CM

## 2022-01-10 PROCEDURE — 99204 OFFICE O/P NEW MOD 45 MIN: CPT | Performed by: PHYSICIAN ASSISTANT

## 2022-01-10 RX ORDER — SODIUM CHLORIDE 9 MG/ML
30 INJECTION, SOLUTION INTRAVENOUS CONTINUOUS PRN
Status: CANCELLED | OUTPATIENT
Start: 2022-01-10
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
## 2022-01-13 ENCOUNTER — TELEPHONE (OUTPATIENT)
Dept: INTERNAL MEDICINE | Facility: CLINIC | Age: 62
End: 2022-01-13

## 2022-01-13 DIAGNOSIS — E11.65 TYPE 2 DIABETES MELLITUS WITH HYPERGLYCEMIA, WITHOUT LONG-TERM CURRENT USE OF INSULIN: ICD-10-CM

## 2022-01-13 DIAGNOSIS — E78.2 MIXED HYPERLIPIDEMIA: Primary | ICD-10-CM

## 2022-01-13 NOTE — TELEPHONE ENCOUNTER
PT STATED HE HAD GONE TO GET LABS TO CHECK ON HIS DIABETES AS DISCUSSED AT LAST VISIT, NO LAB ORDERS ACTIVE ASKED FOR ORDERS TO BE PLACED   PT ASKED FOR A CALL BACK

## 2022-01-28 ENCOUNTER — TELEPHONE (OUTPATIENT)
Dept: CARDIOLOGY | Facility: CLINIC | Age: 62
End: 2022-01-28

## 2022-01-28 NOTE — TELEPHONE ENCOUNTER
Called patient to reschedule his canceled appointment (via [facility] [facility]), but patient said he is going to [place] for a couple of months.  He did not wish to schedule a follow up at this time, but said he will call when he returns to [place].

## 2022-03-11 ENCOUNTER — TELEPHONE (OUTPATIENT)
Dept: GASTROENTEROLOGY | Facility: CLINIC | Age: 62
End: 2022-03-11

## 2022-03-11 RX ORDER — SODIUM, POTASSIUM,MAG SULFATES 17.5-3.13G
SOLUTION, RECONSTITUTED, ORAL ORAL
Qty: 354 ML | Refills: 0 | Status: SHIPPED | OUTPATIENT
Start: 2022-03-11 | End: 2022-03-16

## 2022-03-11 NOTE — TELEPHONE ENCOUNTER
----- Message from Lissette Woods MA sent at 3/10/2022  5:44 PM EST -----  Could you send a prep for the patient.  He is scheduled for May.

## 2022-05-10 PROBLEM — R63.0 POOR APPETITE: Status: ACTIVE | Noted: 2022-05-10

## 2022-05-10 PROBLEM — R63.4 WEIGHT LOSS, ABNORMAL: Status: ACTIVE | Noted: 2022-05-10

## 2022-05-17 ENCOUNTER — TELEPHONE (OUTPATIENT)
Dept: GASTROENTEROLOGY | Facility: CLINIC | Age: 62
End: 2022-05-17

## 2022-05-17 NOTE — TELEPHONE ENCOUNTER
CALLED PATIENT TO CONFIRM UPPER ENDOSCOPY FOR 05/20/22 AT 8 AM. REACHED VOICEMAIL. LEFT DETAILED MESSAGE FOR RETURN CALL.

## 2022-10-10 RX ORDER — CARVEDILOL 6.25 MG/1
TABLET ORAL
Qty: 180 TABLET | Refills: 0 | Status: SHIPPED | OUTPATIENT
Start: 2022-10-10 | End: 2023-01-09

## 2022-10-10 RX ORDER — ATORVASTATIN CALCIUM 80 MG/1
TABLET, FILM COATED ORAL
Qty: 90 TABLET | Refills: 0 | Status: SHIPPED | OUTPATIENT
Start: 2022-10-10 | End: 2023-01-09

## 2022-11-01 RX ORDER — CLOPIDOGREL BISULFATE 75 MG/1
TABLET ORAL
Qty: 90 TABLET | Refills: 3 | Status: SHIPPED | OUTPATIENT
Start: 2022-11-01 | End: 2022-12-05 | Stop reason: ALTCHOICE

## 2022-11-15 NOTE — PROGRESS NOTES
[facility] Cardiology Office Follow Up Note    Tristan Castle  [id number]  11/16/2022    Primary Care Provider: Marybel Fleming MD   Referring Provider: No ref. provider found    Chief Complaint: Bilateral leg pain    History of Present Illness:   Mr. Tristan Castle is a 62 y.o. male who presents to the Cardiology Clinic for follow up of bilateral leg pain.  The patient has a past medical history significant for hypertension, hyperlipidemia, and chronic tobacco use.  He has a past cardiac history significant for presentation with an NSTEMI in 9/21.  At that time, he was found to have severe two-vessel coronary artery disease with severe stenosis in the mid LCx as well as chronic total occlusion of the proximal RCA.  He underwent PCI to the LCx with placement of overlapping JAXSON.  An echocardiogram showed dilated ischemic cardiomyopathy with an LVEF 30-35% as well as mild aortic stenosis.  He previously wore a LifeVest but discontinued use due to discomfort and interference with sleep.  The patient previously reported patient bilateral lower extremity discomfort with ambulation.  Abnormal ankle/brachial indices were followed by a CT abdominal angiogram (in 12/22) with bilateral runoff which showed a total occlusion of the left common illiac artery and a 70% stenosis of right common iliac artery.  At his last cardiology clinic visit, the patient expressed his wish to pursue peripheral intervention with Dr. Mckinley.  He subsequently canceled his appointment with Dr. Mckinley (1/22) and told our staff that he would call our office when he returns to [place].  He presents today for follow-up.  The patient reports ongoing pain in both legs and feet.  He does report that his left leg is worse than his right.  This tends to be worse with walking and may be relieved with rest.  Unfortunately, he continues to smoke.  He specifically denies wounds or ulcerations of bilateral lower extremities.  He specifically  denies chest pain and dyspnea.  He denies palpitations, dizziness, syncope.  He denies orthopnea, PND, and lower extremity edema.  He offers no other complaints or concerns at this time.              Past Cardiac Testin. Last Coronary Angio: 2021              1.  Severe stenosis of the mid LCx treated with overlapping JAXSON (3.0 x 38 mm Xience JAXSON, 3.5 x 23 mm JAXSON)               2.  Chronic total occlusion of the proximal RCA  2. Prior Stress Testing: None  3. Last Echo: 2021  •            The left ventricular cavity is mildly dilated.  • Estimated left ventricular EF = 45% Left ventricular ejection fraction appears to be 41 - 45%. Left ventricular systolic function is low normal.  • Left ventricular diastolic function is consistent with (grade I) impaired relaxation.  • Mild aortic valve stenosis is present.  • Estimated right ventricular systolic pressure from tricuspid regurgitation is normal (<35 mmHg). Calculated right ventricular systolic pressure from tricuspid regurgitation is 15 mmHg      2021  • The left ventricular cavity is moderately dilated.  • The findings are consistent with dilated cardiomyopathy.  • Estimated left ventricular EF = 32% Left ventricular ejection fraction appears to be 31 - 35%. Left ventricular systolic function is moderately decreased.  • Left ventricular diastolic function is consistent with (grade III w/high LAP) reversible restrictive pattern.  • Left atrial volume is moderately increased.  • Mild aortic valve stenosis is present.  • There is mainly affecting the non-coronary, left coronary and right coronary cusp(s).      4. Prior Holter Monitor: None        Past Peripheral Testin. US Ankle/Brachial Indices: 11/15/2021              1. The right VIPUL measures 1.1.               2. The left VIPUL measures 0.5.              3. IMPRESSION:  Severely reduced ankle-brachial index on the left.  2. CT Angio Abdominal Aorta Bilateral Iliofem Runoff: 2021               1. Occlusion of the left common iliac artery with reconstitution of flow in the left external iliac artery via collateral circulation.              2. Calcified and soft plaque in the proximal right common iliac artery producing an approximately 70% stenosis.    Review of Systems:   Review of Systems   Constitutional: Negative for activity change, chills, diaphoresis, fatigue, fever and unexpected weight gain.   Eyes: Negative for blurred vision and visual disturbance.   Respiratory: Negative for apnea, cough, chest tightness, shortness of breath and wheezing.    Cardiovascular: Negative for chest pain, palpitations and leg swelling.   Gastrointestinal: Negative for abdominal distention, blood in stool, GERD and indigestion.   Endocrine: Negative for cold intolerance and heat intolerance.   Genitourinary: Negative for hematuria.   Musculoskeletal: Positive for gait problem. Negative for joint swelling and myalgias.   Skin: Negative for color change, pallor and wound.   Neurological: Negative for dizziness, seizures, syncope, weakness, light-headedness, numbness, headache and confusion.   Hematological: Does not bruise/bleed easily.   Psychiatric/Behavioral: Negative for behavioral problems, sleep disturbance, suicidal ideas and depressed mood.     I have reviewed and confirmed the accuracy of the ROS as documented by the MA/LPN/RN JOSE Ching    I have reviewed and/or updated the patient's past medical, past surgical, family, social history, problem list and allergies as appropriate.     Medications:     Current Outpatient Medications:   •  aspirin (aspirin) 81 MG EC tablet, Take 1 tablet by mouth Daily., Disp: 90 tablet, Rfl: 3  •  atorvastatin (LIPITOR) 80 MG tablet, TAKE 1 TABLET BY MOUTH EVERY DAY, Disp: 90 tablet, Rfl: 0  •  carvedilol (COREG) 6.25 MG tablet, TAKE 1 TABLET BY MOUTH TWICE A DAY, Disp: 180 tablet, Rfl: 0  •  clopidogrel (PLAVIX) 75 MG tablet, TAKE 1 TABLET BY MOUTH EVERY DAY,  "Disp: 90 tablet, Rfl: 3  •  lisinopril (PRINIVIL,ZESTRIL) 10 MG tablet, Take 1 tablet by mouth Daily., Disp: 90 tablet, Rfl: 3  •  cilostazol (PLETAL) 50 MG tablet, Take 1 tablet by mouth 2 (Two) Times a Day., Disp: 60 tablet, Rfl: 1    Physical Exam:  Vital Signs:   Vitals:    11/16/22 1513   BP: 160/72   BP Location: Left arm   Patient Position: Sitting   Pulse: 87   Resp: 8   SpO2: 97%   Weight: 71.2 kg (157 lb)   Height: 175.3 cm (69\")     Body mass index is 23.18 kg/m².    Physical Exam  Vitals and nursing note reviewed.   Constitutional:       General: He is not in acute distress.     Appearance: Normal appearance. He is well-developed.   HENT:      Head: Normocephalic and atraumatic.   Eyes:      General: No scleral icterus.     Extraocular Movements: Extraocular movements intact.   Neck:      Trachea: Trachea normal.   Cardiovascular:      Rate and Rhythm: Normal rate and regular rhythm.      Pulses:           Dorsalis pedis pulses are detected w/ Doppler on the right side and detected w/ Doppler on the left side.        Posterior tibial pulses are detected w/ Doppler on the right side and detected w/ Doppler on the left side.      Heart sounds: Normal heart sounds. No murmur heard.    No friction rub. No gallop.   Pulmonary:      Effort: Pulmonary effort is normal.      Breath sounds: Normal breath sounds.   Musculoskeletal:         General: No swelling. Normal range of motion.      Cervical back: Neck supple.      Right lower leg: No edema.      Left lower leg: No edema.   Skin:     General: Skin is warm and dry.      Findings: No bruising, lesion or rash.   Neurological:      Mental Status: He is alert and oriented to person, place, and time.      Motor: No weakness.      Gait: Gait normal.   Psychiatric:         Mood and Affect: Mood normal.         Behavior: Behavior normal. Behavior is cooperative.         Thought Content: Thought content does not include suicidal ideation.         Results Review:   I " reviewed the patient's new clinical results.    ProceduresAssessment / Plan:     1. PAD (peripheral artery disease) ([facility]) (Primary)  Ongoing pain of bilateral lower extremities where the left is worse than the right  Patient wanted to discuss peripheral intervention last year, but lives in [place] seasonally and was unable to schedule this before he left last year  No evidence of critical limb ischemia  Continue aspirin, Plavix, and statin therapy  Encouraged daily walking to promote circulation  Encouraged complete smoking cessation  TRIAL Pletal to see if this helps to improve symptoms  Advised to call the office with any significant bleeding issues with ASA/Plavix/Pletal therapy  Consulted Dr. Mckinley; follow-up with Dr. Mckinley in 2-3 weeks to reassess symptoms/discuss peripheral intervention    2. Coronary artery disease involving coronary bypass graft of native heart without angina pectoris  Stable and angina free with active lifestyle  Continue aspirin, plavix    3. Chronic systolic congestive heart failure (HCC)  12/21, LVEF 45%  AHA Heart Failure Stage C  NYHA Functional Class I  Euvolemic on exam  Continue b-blocker and ACE therapy  Given he is asymptomatic now, defer medication adjustment, but could consider ARNI, MRA, and SGLT2-I therapy if patient is agreeable    4. Hyperlipidemia  LDL goal less than 70  Continue statin therapy    5. Benign essential HTN  Significantly elevated blood pressure today  Patient prefers to defer medication adjustment but can reassess on follow-up  Encouraged low-sodium diet, avoidance of NSAIDs, and smoking cessation    6. Tobacco abuse  He is not interested in smoking cessation aids today        Preventative Cardiology:   Tobacco Cessation: Cessation Counseling Provided    Advance Care Planning: ACP discussion was declined by the patient. Patient does not have an advance directive, declines further assistance.     Follow Up:   Follow-up with Dr. Mckinley in 3 weeks      Thank you for  allowing me to participate in the care of your patient. Please to not hesitate to contact me with additional questions or concerns.     JOSE Moya

## 2022-11-16 ENCOUNTER — OFFICE VISIT (OUTPATIENT)
Dept: CARDIOLOGY | Facility: CLINIC | Age: 62
End: 2022-11-16

## 2022-11-16 VITALS
BODY MASS INDEX: 23.25 KG/M2 | HEIGHT: 69 IN | RESPIRATION RATE: 8 BRPM | SYSTOLIC BLOOD PRESSURE: 160 MMHG | DIASTOLIC BLOOD PRESSURE: 72 MMHG | HEART RATE: 87 BPM | WEIGHT: 157 LBS | OXYGEN SATURATION: 97 %

## 2022-11-16 DIAGNOSIS — I50.22 CHRONIC SYSTOLIC CONGESTIVE HEART FAILURE: ICD-10-CM

## 2022-11-16 DIAGNOSIS — E78.5 HYPERLIPIDEMIA, UNSPECIFIED HYPERLIPIDEMIA TYPE: ICD-10-CM

## 2022-11-16 DIAGNOSIS — I73.9 PAD (PERIPHERAL ARTERY DISEASE): Primary | ICD-10-CM

## 2022-11-16 DIAGNOSIS — I10 BENIGN ESSENTIAL HTN: ICD-10-CM

## 2022-11-16 DIAGNOSIS — I25.810 CORONARY ARTERY DISEASE INVOLVING CORONARY BYPASS GRAFT OF NATIVE HEART WITHOUT ANGINA PECTORIS: ICD-10-CM

## 2022-11-16 DIAGNOSIS — Z72.0 TOBACCO ABUSE: ICD-10-CM

## 2022-11-16 PROCEDURE — 99214 OFFICE O/P EST MOD 30 MIN: CPT | Performed by: NURSE PRACTITIONER

## 2022-11-16 RX ORDER — CILOSTAZOL 50 MG/1
50 TABLET ORAL 2 TIMES DAILY
Qty: 60 TABLET | Refills: 1 | Status: SHIPPED | OUTPATIENT
Start: 2022-11-16 | End: 2022-12-13

## 2022-11-22 RX ORDER — ALBUTEROL SULFATE 90 UG/1
2 AEROSOL, METERED RESPIRATORY (INHALATION) EVERY 4 HOURS PRN
Refills: 3 | OUTPATIENT
Start: 2022-11-22

## 2022-11-22 NOTE — TELEPHONE ENCOUNTER
Rx Refill Note  Requested Prescriptions     Pending Prescriptions Disp Refills   • albuterol sulfate  (90 Base) MCG/ACT inhaler [Pharmacy Med Name: ALBUTEROL HFA (VENTOLIN) INH]  3     Sig: INHALE 2 PUFFS EVERY 4 (FOUR) HOURS AS NEEDED FOR WHEEZING OR SHORTNESS OF AIR.      Last office visit with prescribing clinician: 10/21/2021      Next office visit with prescribing clinician: Visit date not found            Sruthi Frey MA  11/22/22, 11:28 EST

## 2022-12-05 ENCOUNTER — OFFICE VISIT (OUTPATIENT)
Dept: CARDIOLOGY | Facility: CLINIC | Age: 62
End: 2022-12-05

## 2022-12-05 VITALS
SYSTOLIC BLOOD PRESSURE: 165 MMHG | HEIGHT: 68 IN | HEART RATE: 97 BPM | OXYGEN SATURATION: 98 % | WEIGHT: 158 LBS | DIASTOLIC BLOOD PRESSURE: 89 MMHG | BODY MASS INDEX: 23.95 KG/M2

## 2022-12-05 DIAGNOSIS — E78.00 PURE HYPERCHOLESTEROLEMIA: ICD-10-CM

## 2022-12-05 DIAGNOSIS — I73.9 PAD (PERIPHERAL ARTERY DISEASE): ICD-10-CM

## 2022-12-05 DIAGNOSIS — I50.22 CHRONIC SYSTOLIC CONGESTIVE HEART FAILURE: ICD-10-CM

## 2022-12-05 DIAGNOSIS — Z72.0 TOBACCO ABUSE: ICD-10-CM

## 2022-12-05 DIAGNOSIS — I25.810 CORONARY ARTERY DISEASE INVOLVING CORONARY BYPASS GRAFT OF NATIVE HEART WITHOUT ANGINA PECTORIS: Primary | ICD-10-CM

## 2022-12-05 DIAGNOSIS — I10 BENIGN ESSENTIAL HTN: ICD-10-CM

## 2022-12-05 PROCEDURE — 99214 OFFICE O/P EST MOD 30 MIN: CPT | Performed by: INTERNAL MEDICINE

## 2022-12-05 NOTE — PROGRESS NOTES
[facility] Cardiology Office Follow Up Note    Tristan Castle  [id number]  2022    Primary Care Provider: Marybel Fleming MD    Chief Complaint: Routine follow-up    History of Present Illness:   Mr. Tristan Castle is a 62 y.o. male who presents to the Cardiology Clinic for follow up of coronary artery disease.  The patient has a past medical history significant for hypertension, hyperlipidemia, and chronic tobacco use.  He has a past cardiac history significant for presentation with an NSTEMI in .  At that time, he was found to have severe two-vessel coronary artery disease with severe stenosis in the mid LCx as well as chronic total occlusion of the proximal RCA.  He underwent PCI to the LCx with placement of overlapping JAXSON.  An echocardiogram showed dilated ischemic cardiomyopathy with an LVEF 30-35% as well as mild aortic stenosis.    He also has a history of peripheral arterial disease, with a CTA in  showing occlusion of the proximal left common iliac artery with reconstitution distally via collateralization.  He was also noted to have 70% stenosis in the right common iliac artery.  At the time of his last appointment, he was started on Pletal.  He presents today for follow-up.  With Pletal, the patient reports overall improvement in his left lower extremity discomfort.  He reports he remains active walking over 5000 steps per day, and is not limited by his left lower extremity discomfort.  No history of left lower extremity wounds or ulcerations.  He is tolerating Pletal without difficulty.  No chest pain or exertional angina.  No other specific complaints today.    Past Cardiac Testin. Last Coronary Angio: 2021              1.  Severe stenosis of the mid LCx treated with overlapping JAXSON (3.0 x 38 mm Xience JAXSON, 3.5 x 23 mm JAXSON)               2.  Chronic total occlusion of the proximal RCA  2. Prior Stress Testing: None  3. Last Echo:               1.  LVEF  40-45%              2.  Grade 1 diastolic dysfunction              3.  Mild aortic stenosis  4. Prior Holter Monitor: None    Review of Systems:   Review of Systems   Constitutional: Negative for activity change, appetite change, chills, diaphoresis, fatigue, fever, unexpected weight gain and unexpected weight loss.   Eyes: Negative for blurred vision and double vision.   Respiratory: Negative for cough, chest tightness, shortness of breath and wheezing.    Cardiovascular: Negative for chest pain, palpitations and leg swelling.   Gastrointestinal: Negative for abdominal pain, anal bleeding, blood in stool and GERD.   Endocrine: Negative for cold intolerance and heat intolerance.   Genitourinary: Negative for hematuria.   Musculoskeletal:        Left lower extremity discomfort with ambulation   Neurological: Negative for dizziness, syncope, weakness and light-headedness.   Hematological: Does not bruise/bleed easily.   Psychiatric/Behavioral: Negative for depressed mood and stress. The patient is not nervous/anxious.        I have reviewed and/or updated the patient's past medical, past surgical, family, social history, problem list and allergies as appropriate.     Medications:     Current Outpatient Medications:   •  aspirin (aspirin) 81 MG EC tablet, Take 1 tablet by mouth Daily., Disp: 90 tablet, Rfl: 3  •  atorvastatin (LIPITOR) 80 MG tablet, TAKE 1 TABLET BY MOUTH EVERY DAY, Disp: 90 tablet, Rfl: 0  •  carvedilol (COREG) 6.25 MG tablet, TAKE 1 TABLET BY MOUTH TWICE A DAY, Disp: 180 tablet, Rfl: 0  •  cilostazol (PLETAL) 50 MG tablet, Take 1 tablet by mouth 2 (Two) Times a Day., Disp: 60 tablet, Rfl: 1  •  lisinopril (PRINIVIL,ZESTRIL) 10 MG tablet, Take 1 tablet by mouth Daily., Disp: 90 tablet, Rfl: 3    Physical Exam:  Vital Signs:   Vitals:    12/05/22 1341 12/05/22 1343   BP: 154/84 165/89  Comment: patient's home b/p cuff   BP Location: Left arm Left arm   Patient Position: Sitting    Pulse: 97    SpO2:  "98%    Weight: 71.7 kg (158 lb)    Height: 172.7 cm (68\")        Physical Exam  Constitutional:       General: He is not in acute distress.     Appearance: Normal appearance. He is not diaphoretic.   HENT:      Head: Normocephalic and atraumatic.   Cardiovascular:      Rate and Rhythm: Normal rate and regular rhythm.      Heart sounds: No murmur heard.     Comments: 1+ left lower extremity DP and PT pulse.  Normal right lower extremity pulses.  Pulmonary:      Effort: Pulmonary effort is normal. No respiratory distress.      Breath sounds: Normal breath sounds. No stridor. No wheezing, rhonchi or rales.   Abdominal:      General: Bowel sounds are normal. There is no distension.      Palpations: Abdomen is soft.      Tenderness: There is no abdominal tenderness. There is no guarding or rebound.   Musculoskeletal:         General: No swelling. Normal range of motion.      Cervical back: Neck supple. No tenderness.   Skin:     General: Skin is warm and dry.   Neurological:      General: No focal deficit present.      Mental Status: He is alert and oriented to person, place, and time.   Psychiatric:         Mood and Affect: Mood normal.         Behavior: Behavior normal.         Results Review:   I reviewed the patient's new clinical results.      Assessment / Plan:     1.  Severe two-vessel coronary artery disease  --Presented with an NSTEMI in 9/21, found to have  of the proximal RCA and underwent PCI x2 to the mid LCx  --Remains active without chest pain or exertional angina  --Given over 1 year from PCI, de-escalate DAPT to aspirin monotherapy  --Continue high intensity statin     2.  Ischemic cardiomyopathy  --LVEF 40-45% on last assessment in 1/22  --Remains euvolemic and well compensated, NYHA class I  --Continue carvedilol and lisinopril  --Previously on Entresto, did not tolerate due to hypotension     3.  Hypertension  --Hypertensive today, however BP appears to be fluctuating on home BP checks  --Continue " current antihypertensives     4.  Hyperlipidemia  --Lipid profile in 9/21 showed LDL 79, HDL 47, triglycerides 86  --Continue high intensity statin     5.  Peripheral arterial disease  -- Short segment of proximal/ostial left common iliac occlusion on CTA, 70% stenosis right iliac stenosis  -- Symptoms of claudication are currently minimal, do not inhibit daily activities  -- Continue Pletal  -- Continue aspirin and high intensity statin  -- Should symptoms of claudication worsen, would then proceed with percutaneous intervention    6.  Chronic tobacco use  -- Complete cessation advised        Follow Up:   Return in about 1 year (around 12/5/2023).      Thank you for allowing me to participate in the care of your patient. Please to not hesitate to contact me with additional questions or concerns.     FRANCES Mckinley MD  Interventional Cardiology   12/05/2022  13:19 EST

## 2022-12-09 ENCOUNTER — TELEPHONE (OUTPATIENT)
Dept: INTERNAL MEDICINE | Facility: CLINIC | Age: 62
End: 2022-12-09

## 2022-12-09 RX ORDER — ALBUTEROL SULFATE 90 UG/1
2 AEROSOL, METERED RESPIRATORY (INHALATION) EVERY 4 HOURS PRN
Qty: 108 G | Refills: 3 | Status: SHIPPED | OUTPATIENT
Start: 2022-12-09

## 2022-12-09 NOTE — TELEPHONE ENCOUNTER
Rx Refill Note  Requested Prescriptions     Pending Prescriptions Disp Refills   • albuterol sulfate  (90 Base) MCG/ACT inhaler 108 g 3     Sig: Inhale 2 puffs Every 4 (Four) Hours As Needed for Wheezing.      Last office visit with prescribing clinician: 12/1/2021   Last telemedicine visit with prescribing clinician: 12/28/2022   Next office visit with prescribing clinician: Visit date not found   {TIP  Encounters:23}                      Would you like a call back once the refill request has been completed: [] Yes [] No    If the office needs to give you a call back, can they leave a voicemail: [] Yes [] No    Johnny Davis MA  12/09/22, 12:44 EST

## 2022-12-09 NOTE — TELEPHONE ENCOUNTER
Caller: Tin Tristan    Relationship: Self    Best call back number:    [phone]     Requested Prescriptions:   Requested Prescriptions      No prescriptions requested or ordered in this encounter      ALBUTEROL INHALER     Pharmacy where request should be sent: [facility]ARMACY #[id number] - [place], [place] - [address] [place] [address] [phone] [facility] [phone] FX     Additional details provided by patient: PATIENT  IS OUT OF THE MEDIATION   HE HAS A MEDICARE WELLNESS APPOINTMENT ON 12-28-22    Does the patient have less than a 3 day supply:  [x] Yes  [] No    Would you like a call back once the refill request has been completed: [x] Yes [] No    If the office needs to give you a call back, can they leave a voicemail: [x] Yes [] No

## 2022-12-09 NOTE — TELEPHONE ENCOUNTER
HUB can share.  LVM to schedule AWV that is Overdue. Please advise patient and schedule. Letter sent via [employer].   Thanks.

## 2022-12-13 DIAGNOSIS — I73.9 PAD (PERIPHERAL ARTERY DISEASE): ICD-10-CM

## 2022-12-13 RX ORDER — CILOSTAZOL 50 MG/1
TABLET ORAL
Qty: 60 TABLET | Refills: 11 | Status: SHIPPED | OUTPATIENT
Start: 2022-12-13 | End: 2022-12-16 | Stop reason: SDUPTHER

## 2022-12-16 DIAGNOSIS — I73.9 PAD (PERIPHERAL ARTERY DISEASE): ICD-10-CM

## 2022-12-16 RX ORDER — CILOSTAZOL 50 MG/1
50 TABLET ORAL 2 TIMES DAILY
Qty: 180 TABLET | Refills: 3 | Status: SHIPPED | OUTPATIENT
Start: 2022-12-16

## 2022-12-28 ENCOUNTER — TELEPHONE (OUTPATIENT)
Dept: INTERNAL MEDICINE | Facility: CLINIC | Age: 62
End: 2022-12-28

## 2022-12-28 ENCOUNTER — OFFICE VISIT (OUTPATIENT)
Dept: INTERNAL MEDICINE | Facility: CLINIC | Age: 62
End: 2022-12-28

## 2022-12-28 VITALS
HEIGHT: 69 IN | HEART RATE: 96 BPM | WEIGHT: 157.8 LBS | SYSTOLIC BLOOD PRESSURE: 142 MMHG | TEMPERATURE: 97.8 F | DIASTOLIC BLOOD PRESSURE: 78 MMHG | OXYGEN SATURATION: 96 % | RESPIRATION RATE: 12 BRPM | BODY MASS INDEX: 23.37 KG/M2

## 2022-12-28 DIAGNOSIS — Z00.00 MEDICARE ANNUAL WELLNESS VISIT, SUBSEQUENT: Primary | ICD-10-CM

## 2022-12-28 DIAGNOSIS — E78.2 MIXED HYPERLIPIDEMIA: ICD-10-CM

## 2022-12-28 DIAGNOSIS — Z12.11 ENCOUNTER FOR SCREENING FOR MALIGNANT NEOPLASM OF COLON: ICD-10-CM

## 2022-12-28 DIAGNOSIS — I50.22 CHRONIC SYSTOLIC CONGESTIVE HEART FAILURE: ICD-10-CM

## 2022-12-28 DIAGNOSIS — I73.9 PAD (PERIPHERAL ARTERY DISEASE): ICD-10-CM

## 2022-12-28 DIAGNOSIS — Z72.0 TOBACCO ABUSE: ICD-10-CM

## 2022-12-28 DIAGNOSIS — E11.65 TYPE 2 DIABETES MELLITUS WITH HYPERGLYCEMIA, WITHOUT LONG-TERM CURRENT USE OF INSULIN: ICD-10-CM

## 2022-12-28 DIAGNOSIS — I10 ESSENTIAL HYPERTENSION: ICD-10-CM

## 2022-12-28 DIAGNOSIS — Z71.85 IMMUNIZATION COUNSELING: ICD-10-CM

## 2022-12-28 PROCEDURE — 1170F FXNL STATUS ASSESSED: CPT | Performed by: NURSE PRACTITIONER

## 2022-12-28 PROCEDURE — 90677 PCV20 VACCINE IM: CPT | Performed by: NURSE PRACTITIONER

## 2022-12-28 PROCEDURE — G0439 PPPS, SUBSEQ VISIT: HCPCS | Performed by: NURSE PRACTITIONER

## 2022-12-28 PROCEDURE — 1159F MED LIST DOCD IN RCRD: CPT | Performed by: NURSE PRACTITIONER

## 2022-12-28 PROCEDURE — G0009 ADMIN PNEUMOCOCCAL VACCINE: HCPCS | Performed by: NURSE PRACTITIONER

## 2022-12-28 RX ORDER — LISINOPRIL 10 MG/1
10 TABLET ORAL DAILY
Qty: 90 TABLET | Refills: 3 | Status: SHIPPED | OUTPATIENT
Start: 2022-12-28

## 2022-12-28 RX ORDER — LISINOPRIL 20 MG/1
20 TABLET ORAL DAILY
Qty: 90 TABLET | Refills: 1 | Status: SHIPPED | OUTPATIENT
Start: 2022-12-28 | End: 2022-12-28

## 2022-12-28 NOTE — PROGRESS NOTES
The ABCs of the Annual Wellness Visit  Subsequent Medicare Wellness Visit    Chief Complaint   Patient presents with   • Medicare Wellness-subsequent      Subjective    History of Present Illness:  Tristan Castle is a 62 y.o. male who presents for a Subsequent Medicare Wellness Visit.  HTN- blood pressure has been up and down lately. He does not necessarily feel bad but knows his vision blurs when his blood pressure is up. Highest it has been is 160-180. At his cardiology visit it was up but medications were not adjusted. He is taking his carvedilol and lisinopril as prescribed. Denies chest pain, orthopnea, lower extremity swelling, dizziness, and excess fatigue. Follows DASH diet and exercises through walking. He is compliant cilostazol and aspirin for PAD. Legs are still painful when he walks but not worsening. He discussed surgical intervention but trying medical therapy first.   He is still smoking about 5 cigarettes per day.   HLD- taking atorvastatin as prescribed. Denies dark urine and myalgia.   Uses albuterol as needed for shortness of breath.   No acute complaints today.     The following portions of the patient's history were reviewed and   updated as appropriate: allergies, current medications, past family history, past medical history, past social history, past surgical history and problem list.    Compared to one year ago, the patient feels his physical   health is better.    Compared to one year ago, the patient feels his mental   health is the same.    Recent Hospitalizations:  He was not admitted to the hospital during the last year.       Current Medical Providers:  Patient Care Team:  Marybel Fleming MD as PCP - General (Internal Medicine)    Outpatient Medications Prior to Visit   Medication Sig Dispense Refill   • albuterol sulfate  (90 Base) MCG/ACT inhaler Inhale 2 puffs Every 4 (Four) Hours As Needed for Wheezing. 108 g 3   • aspirin (aspirin) 81 MG EC tablet Take 1 tablet by mouth Daily.  90 tablet 3   • atorvastatin (LIPITOR) 80 MG tablet TAKE 1 TABLET BY MOUTH EVERY DAY 90 tablet 0   • carvedilol (COREG) 6.25 MG tablet TAKE 1 TABLET BY MOUTH TWICE A  tablet 0   • cilostazol (PLETAL) 50 MG tablet Take 1 tablet by mouth 2 (Two) Times a Day. 180 tablet 3   • lisinopril (PRINIVIL,ZESTRIL) 10 MG tablet Take 1 tablet by mouth Daily. 90 tablet 3     No facility-administered medications prior to visit.       No opioid medication identified on active medication list. I have reviewed chart for other potential  high risk medication/s and harmful drug interactions in the elderly.          Aspirin is on active medication list. Aspirin use is indicated based on review of current medical condition/s. Pros and cons of this therapy have been discussed today. Benefits of this medication outweigh potential harm.  Patient has been encouraged to continue taking this medication.  .      Patient Active Problem List   Diagnosis   • Benign essential HTN   • SOB (shortness of breath)   • Tobacco abuse   • Centrilobular emphysema (HCC)   • Chronic systolic congestive heart failure (HCC)   • Coronary artery disease involving coronary bypass graft of native heart without angina pectoris   • Abnormal fasting glucose   • Anxiety disorder   • Avitaminosis D   • Chronic pain associated with significant psychosocial dysfunction   • Hyperlipidemia   • Depression, major, recurrent (HCC)   • Fatigue   • LBP (low back pain)   • Nerve pain   • Poor appetite   • Weight loss, abnormal   • PAD (peripheral artery disease) (HCC)     Advance Care Planning  Advance Directive is not on file.  ACP discussion was held with the patient during this visit. Patient does not have an advance directive, information provided.    Review of Systems   Constitutional: Negative for fatigue.   Eyes: Negative for visual disturbance.   Respiratory: Positive for shortness of breath (baseline). Negative for cough and wheezing.    Cardiovascular: Negative  "for chest pain, palpitations and leg swelling.   Gastrointestinal: Negative for abdominal pain, blood in stool, constipation and diarrhea.   Endocrine: Negative for polydipsia, polyphagia and polyuria.   Musculoskeletal: Positive for arthralgias and myalgias.   Skin: Negative for rash.   Neurological: Negative for weakness.   Psychiatric/Behavioral: The patient is not nervous/anxious.         Objective    Vitals:    12/28/22 1348 12/28/22 1435   BP: 144/78 142/78   BP Location: Left arm    Patient Position: Sitting    Cuff Size: Adult    Pulse: 96    Resp: 12    Temp: 97.8 °F (36.6 °C)    TempSrc: Infrared    SpO2: 96%    Weight: 71.6 kg (157 lb 12.8 oz)    Height: 175.3 cm (69\")    PainSc: 0-No pain      Estimated body mass index is 23.3 kg/m² as calculated from the following:    Height as of this encounter: 175.3 cm (69\").    Weight as of this encounter: 71.6 kg (157 lb 12.8 oz).    BMI is within normal parameters. No other follow-up for BMI required.  Does the patient have evidence of cognitive impairment? No    Physical Exam  Vitals and nursing note reviewed.   Constitutional:       General: He is not in acute distress.     Appearance: Normal appearance.   HENT:      Right Ear: Tympanic membrane and ear canal normal.      Left Ear: Tympanic membrane and ear canal normal.      Nose: Nose normal.      Mouth/Throat:      Mouth: Mucous membranes are moist.      Pharynx: No posterior oropharyngeal erythema.   Eyes:      Extraocular Movements: Extraocular movements intact.      Right eye: No nystagmus.      Left eye: No nystagmus.      Conjunctiva/sclera: Conjunctivae normal.      Pupils: Pupils are equal, round, and reactive to light.   Neck:      Thyroid: No thyroid mass or thyromegaly.      Vascular: No carotid bruit.   Cardiovascular:      Rate and Rhythm: Normal rate and regular rhythm.      Pulses:           Dorsalis pedis pulses are 2+ on the right side and 1+ on the left side.        Posterior tibial pulses " are 2+ on the right side and 1+ on the left side.      Heart sounds: Normal heart sounds. No murmur heard.  Pulmonary:      Effort: Pulmonary effort is normal.      Breath sounds: Normal breath sounds. No wheezing.   Abdominal:      General: Bowel sounds are normal. There is no distension.      Palpations: Abdomen is soft.      Tenderness: There is no abdominal tenderness.      Hernia: No hernia is present.   Musculoskeletal:         General: Deformity present. No tenderness. Normal range of motion.      Cervical back: Normal range of motion and neck supple. No muscular tenderness.      Right lower leg: No edema.      Left lower leg: No edema.   Lymphadenopathy:      Head:      Right side of head: No submandibular, tonsillar, preauricular or posterior auricular adenopathy.      Left side of head: No submandibular, tonsillar, preauricular or posterior auricular adenopathy.      Cervical: No cervical adenopathy.   Skin:     General: Skin is warm and dry.      Capillary Refill: Capillary refill takes less than 2 seconds.      Findings: No lesion or rash.   Neurological:      General: No focal deficit present.      Mental Status: He is alert and oriented to person, place, and time.      Coordination: Coordination is intact.      Gait: Gait is intact.      Deep Tendon Reflexes: Reflexes normal.   Psychiatric:         Mood and Affect: Mood normal.         Behavior: Behavior normal.               HEALTH RISK ASSESSMENT    Smoking Status:  Social History     Tobacco Use   Smoking Status Light Smoker   • Packs/day: 0.25   • Years: 50.00   • Pack years: 12.50   • Types: Cigarettes   • Start date: 2/24/1971   Smokeless Tobacco Never     Alcohol Consumption:  Social History     Substance and Sexual Activity   Alcohol Use Yes   • Alcohol/week: 2.0 standard drinks   • Types: 2 Drinks containing 0.5 oz of alcohol per week    Comment: Per every two weeks     Fall Risk Screen:    STEADI Fall Risk Assessment was completed, and  patient is at LOW risk for falls.Assessment completed on:12/28/2022    Depression Screening:  PHQ-2/PHQ-9 Depression Screening 12/28/2022   Little Interest or Pleasure in Doing Things 0-->not at all   Feeling Down, Depressed or Hopeless 0-->not at all   PHQ-9: Brief Depression Severity Measure Score 0       Health Habits and Functional and Cognitive Screening:  Functional & Cognitive Status 12/28/2022   Do you have difficulty preparing food and eating? No   Do you have difficulty bathing yourself, getting dressed or grooming yourself? No   Do you have difficulty using the toilet? No   Do you have difficulty moving around from place to place? No   Do you have trouble with steps or getting out of a bed or a chair? No   Current Diet Limited Junk Food   Dental Exam Up to date   Eye Exam Not up to date   Exercise (times per week) 7 times per week   Current Exercises Include Walking   Do you need help using the phone?  No   Are you deaf or do you have serious difficulty hearing?  No   Do you need help with transportation? No   Do you need help shopping? No   Do you need help preparing meals?  No   Do you need help with housework?  No   Do you need help with laundry? No   Do you need help taking your medications? No   Do you need help managing money? No   Do you ever drive or ride in a car without wearing a seat belt? No   Have you felt unusual stress, anger or loneliness in the last month? No   Who do you live with? Spouse   If you need help, do you have trouble finding someone available to you? No   Have you been bothered in the last four weeks by sexual problems? No   Do you have difficulty concentrating, remembering or making decisions? No       Age-appropriate Screening Schedule:  Refer to the list below for future screening recommendations based on patient's age, sex and/or medical conditions. Orders for these recommended tests are listed in the plan section. The patient has been provided with a written  plan.    Health Maintenance   Topic Date Due   • URINE MICROALBUMIN  Never done   • ZOSTER VACCINE (1 of 2) Never done   • DIABETIC FOOT EXAM  Never done   • DIABETIC EYE EXAM  Never done   • HEMOGLOBIN A1C  03/16/2022   • LIPID PANEL  09/16/2022   • TDAP/TD VACCINES (2 - Td or Tdap) 11/05/2030   • INFLUENZA VACCINE  Completed              Assessment & Plan   CMS Preventative Services Quick Reference  Risk Factors Identified During Encounter  Immunizations Discussed/Encouraged: Prevnar 20 (Pneumococcal 20-valent conjugate)  Dental Screening Recommended  Vision Screening Recommended  The above risks/problems have been discussed with the patient.  Follow up actions/plans if indicated are seen below in the Assessment/Plan Section.  Pertinent information has been shared with the patient in the After Visit Summary.    Diagnoses and all orders for this visit:    1. Medicare annual wellness visit, subsequent (Primary)  Preventative maintenance discussed during visit and information provided in AVS. PCV 20 administered today.  Had colon cancer screening through fit test, unfortunately health maintenance is not updating but he should be okay for 1 year.  Repeat in October 2023.  3. Mixed hyperlipidemia  Update lipid panel today, PCP is already placed labs.  Low-cholesterol diet and compliance with statin recommended.  4. Type 2 diabetes mellitus with hyperglycemia, without long-term current use of insulin ([facility])  Has been diligently working on exercise and diet, recheck A1c today may need to further medication therapy with metformin.  Recommend decreasing carbohydrates and sugar, continue moderate intensity exercise 4-5 times per week, annual eye exams, and check feet daily for new ulcer or callus.  5. Essential hypertension  Elevated in the office today, was going to increase lisinopril to 20 mg, reviewed cardiologist note which stated he wanted to keep him on 10 mg due to fluctuations at home.  He does have significant  peripheral artery disease therefore may benefit from a higher pressure, continue lisinopril 10 mg for now.  Home blood pressure monitoring, DASH diet, moderate intensity exercise 4-5 times per week, and stress management encouraged.  If consistently greater than 150/90 needs to be reevaluated.  Labs today.  6. Chronic systolic congestive heart failure (HCC)  -     lisinopril (PRINIVIL,ZESTRIL) 10 MG tablet; Take 1 tablet by mouth Daily.  Dispense: 90 tablet; Refill: 3  Initial cardiology, keep follow-up and continue current medication regimen.  7. Immunization counseling  We discussed the risks and benefits of Prevnar 20.  Counseling was given to inform of the potential signs and symptoms of adverse effects and when to seek medical attention.    The CDC Vaccination Information Sheet (VIS) was given for review prior to administration.  A copy of the VIS was also offered.    8. Tobacco abuse  Tristan Castle  reports that he has been smoking cigarettes. He started smoking about 51 years ago. He has a 12.50 pack-year smoking history. He has never used smokeless tobacco.. I have educated him on the risk of diseases from using tobacco products such as cancer, COPD and heart disease.   I advised him to quit and he is not willing to quit.  I spent 4 minutes counseling the patient.  9.  Peripheral artery disease  Managed by cardiology, keep follow-up.  Continue Pletal at current dose for now.  Exercise through walking encouraged in smoking cessation encouraged.  Other orders  -     Pneumococcal Conjugate Vaccine 20-Valent (PCV20)  -     Discontinue: lisinopril (PRINIVIL,ZESTRIL) 20 MG tablet; Take 1 tablet by mouth Daily.  Dispense: 90 tablet; Refill: 1  Follow Up:   Return in about 6 months (around 6/28/2023) for Next scheduled follow up.     An After Visit Summary and PPPS were made available to the patient.

## 2022-12-28 NOTE — TELEPHONE ENCOUNTER
Pharmacy Name: [facility]     Pharmacy representative phone number:[phone]    What medication are you calling in regards to: LISINOPRIL     What question does the pharmacy have: NEEDS CLARIFICATION ON DOSAGE    Who is the provider that prescribed the medication: SHA FARNSWORTH    Additional notes:

## 2022-12-28 NOTE — TELEPHONE ENCOUNTER
Cancel 20 mg script, cardiology wants to keep him on 10 mg so can go off of previous prescription sent by them. Thanks.

## 2022-12-28 NOTE — PATIENT INSTRUCTIONS
Advance Care Planning and Advance Directives     You make decisions on a daily basis - decisions about where you want to live, your career, your home, your life. Perhaps one of the most important decisions you face is your choice for future medical care. Take time to talk with your family and your healthcare team and start planning today.  Advance Care Planning is a process that can help you:  · Understand possible future healthcare decisions in light of your own experiences  · Reflect on those decision in light of your goals and values  · Discuss your decisions with those closest to you and the healthcare professionals that care for you  · Make a plan by creating a document that reflects your wishes    Surrogate Decision Maker  In the event of a medical emergency, which has left you unable to communicate or to make your own decisions, you would need someone to make decisions for you.  It is important to discuss your preferences for medical treatment with this person while you are in good health.     Qualities of a surrogate decision maker:  • Willing to take on this role and responsibility  • Knows what you want for future medical care  • Willing to follow your wishes even if they don't agree with them  • Able to make difficult medical decisions under stressful circumstances    Advance Directives  These are legal documents you can create that will guide your healthcare team and decision maker(s) when needed. These documents can be stored in the electronic medical record.    · Living Will - a legal document to guide your care if you have a terminal condition or a serious illness and are unable to communicate. States vary by statute in document names/types, but most forms may include one or more of the following:        -  Directions regarding life-prolonging treatments        -  Directions regarding artificially provided nutrition/hydration        -  Choosing a healthcare decision maker        -  Direction  regarding organ/tissue donation    · Durable Power of  for Healthcare - this document names an -in-fact to make medical decisions for you, but it may also allow this person to make personal and financial decisions for you. Please seek the advice of an  if you need this type of document.    **Advance Directives are not required and no one may discriminate against you if you do not sign one.    Medical Orders  Many states allow specific forms/orders signed by your physician to record your wishes for medical treatment in your current state of health. This form, signed in personal communication with your physician, addresses resuscitation and other medical interventions that you may or may not want.      For more information or to schedule a time with a [facility] Advance Care Planning Facilitator contact: [facility] or call [phone] and someone will contact you directly.  You are due for Shingrix vaccination series ( the newest shingles vaccine).  It is a two shot series spaced 2-6 months apart. Please get this vaccine series started at your earliest convenience at your local pharmacy to help avoid shingles outbreak. It is more effective than the old Zostavax vaccine and is recommended even if you have had the Zostavax vaccine in the past.  Once the Shingrix series is completed, it does not need to be repeated.   For more information, please look at the website below:  [facility] Shingrix Vaccine Information      Medicare Wellness  Personal Prevention Plan of Service     Date of Office Visit:    Encounter Provider:  JOSE Barcenas  Place of Service:  [facility] PR[facility]ARE  Patient Name: Tristan Castle  :  1960    As part of the Medicare Wellness portion of your visit today, we are providing you with this personalized preventive plan of services (PPPS). This plan is based upon recommendations of the [place] Preventive Services Task Force (USPSTF)  and the Advisory Committee on Immunization Practices (ACIP).    This lists the preventive care services that should be considered, and provides dates of when you are due. Items listed as completed are up-to-date and do not require any further intervention.    Health Maintenance   Topic Date Due   • URINE MICROALBUMIN  Never done   • COLORECTAL CANCER SCREENING  Never done   • ZOSTER VACCINE (1 of 2) Never done   • HEPATITIS C SCREENING  Never done   • ANNUAL WELLNESS VISIT  Never done   • DIABETIC FOOT EXAM  Never done   • DIABETIC EYE EXAM  Never done   • HEMOGLOBIN A1C  03/16/2022   • LIPID PANEL  09/16/2022   • Pneumococcal Vaccine 0-64 (2 - PCV) 09/21/2022   • TDAP/TD VACCINES (2 - Td or Tdap) 11/05/2030   • COVID-19 Vaccine  Completed   • INFLUENZA VACCINE  Completed       No orders of the defined types were placed in this encounter.      No follow-ups on file.          Advance Care Planning and Advance Directives     You make decisions on a daily basis - decisions about where you want to live, your career, your home, your life. Perhaps one of the most important decisions you face is your choice for future medical care. Take time to talk with your family and your healthcare team and start planning today.  Advance Care Planning is a process that can help you:  · Understand possible future healthcare decisions in light of your own experiences  · Reflect on those decision in light of your goals and values  · Discuss your decisions with those closest to you and the healthcare professionals that care for you  · Make a plan by creating a document that reflects your wishes    Surrogate Decision Maker  In the event of a medical emergency, which has left you unable to communicate or to make your own decisions, you would need someone to make decisions for you.  It is important to discuss your preferences for medical treatment with this person while you are in good health.     Qualities of a surrogate decision  maker:  • Willing to take on this role and responsibility  • Knows what you want for future medical care  • Willing to follow your wishes even if they don't agree with them  • Able to make difficult medical decisions under stressful circumstances    Advance Directives  These are legal documents you can create that will guide your healthcare team and decision maker(s) when needed. These documents can be stored in the electronic medical record.    · Living Will - a legal document to guide your care if you have a terminal condition or a serious illness and are unable to communicate. States vary by statute in document names/types, but most forms may include one or more of the following:        -  Directions regarding life-prolonging treatments        -  Directions regarding artificially provided nutrition/hydration        -  Choosing a healthcare decision maker        -  Direction regarding organ/tissue donation    · Durable Power of  for Healthcare - this document names an -in-fact to make medical decisions for you, but it may also allow this person to make personal and financial decisions for you. Please seek the advice of an  if you need this type of document.    **Advance Directives are not required and no one may discriminate against you if you do not sign one.    Medical Orders  Many states allow specific forms/orders signed by your physician to record your wishes for medical treatment in your current state of health. This form, signed in personal communication with your physician, addresses resuscitation and other medical interventions that you may or may not want.      For more information or to schedule a time with a [facility] Advance Care Planning Facilitator contact: [facility] or call [phone] and someone will contact you directly.  You are due for Shingrix vaccination series ( the newest shingles vaccine).  It is a two shot series spaced 2-6 months apart. Please  get this vaccine series started at your earliest convenience at your local pharmacy to help avoid shingles outbreak. It is more effective than the old Zostavax vaccine and is recommended even if you have had the Zostavax vaccine in the past.  Once the Shingrix series is completed, it does not need to be repeated.   For more information, please look at the website below:  [facility] Shingrix Vaccine Information      Medicare Wellness  Personal Prevention Plan of Service     Date of Office Visit:    Encounter Provider:  JOSE Barcenas  Place of Service:  [facility] PRIMARY [facility]ARE  Patient Name: Tristan Castle  :  1960    As part of the Medicare Wellness portion of your visit today, we are providing you with this personalized preventive plan of services (PPPS). This plan is based upon recommendations of the [place] Preventive Services Task Force (USPSTF) and the Advisory Committee on Immunization Practices (ACIP).    This lists the preventive care services that should be considered, and provides dates of when you are due. Items listed as completed are up-to-date and do not require any further intervention.    Health Maintenance   Topic Date Due   • URINE MICROALBUMIN  Never done   • COLORECTAL CANCER SCREENING  Never done   • ZOSTER VACCINE (1 of 2) Never done   • HEPATITIS C SCREENING  Never done   • ANNUAL WELLNESS VISIT  Never done   • DIABETIC FOOT EXAM  Never done   • DIABETIC EYE EXAM  Never done   • HEMOGLOBIN A1C  2022   • LIPID PANEL  2022   • Pneumococcal Vaccine 0-64 (2 - PCV) 2022   • TDAP/TD VACCINES (2 - Td or Tdap) 2030   • COVID-19 Vaccine  Completed   • INFLUENZA VACCINE  Completed       No orders of the defined types were placed in this encounter.      No follow-ups on file.

## 2023-01-09 RX ORDER — ATORVASTATIN CALCIUM 80 MG/1
TABLET, FILM COATED ORAL
Qty: 90 TABLET | Refills: 3 | Status: SHIPPED | OUTPATIENT
Start: 2023-01-09

## 2023-01-09 RX ORDER — CARVEDILOL 6.25 MG/1
TABLET ORAL
Qty: 180 TABLET | Refills: 3 | Status: SHIPPED | OUTPATIENT
Start: 2023-01-09

## 2023-06-27 ENCOUNTER — TELEPHONE (OUTPATIENT)
Dept: INTERNAL MEDICINE | Facility: CLINIC | Age: 63
End: 2023-06-27

## 2023-06-27 NOTE — TELEPHONE ENCOUNTER
Caller: Tristan Castle    Relationship: Self    Best call back number: [phone]    What orders are you requesting (i.e. lab or imaging): BLOOD WORK     In what timeframe would the patient need to come in:  BEFORE 7-5-23    Where will you receive your lab/imaging services:     Additional notes  PLEASE CALL

## 2023-06-27 NOTE — TELEPHONE ENCOUNTER
Spoke with patient, advised that  is out of the office until his scheduled appointment. Labs will be ordered at the time of appointment.

## 2023-07-28 ENCOUNTER — LAB (OUTPATIENT)
Dept: LAB | Facility: HOSPITAL | Age: 63
End: 2023-07-28
Payer: MEDICARE

## 2023-07-28 PROCEDURE — 80061 LIPID PANEL: CPT | Performed by: INTERNAL MEDICINE

## 2023-07-28 PROCEDURE — 82570 ASSAY OF URINE CREATININE: CPT | Performed by: INTERNAL MEDICINE

## 2023-07-28 PROCEDURE — 85025 COMPLETE CBC W/AUTO DIFF WBC: CPT | Performed by: INTERNAL MEDICINE

## 2023-07-28 PROCEDURE — 80053 COMPREHEN METABOLIC PANEL: CPT | Performed by: INTERNAL MEDICINE

## 2023-07-28 PROCEDURE — 83036 HEMOGLOBIN GLYCOSYLATED A1C: CPT | Performed by: INTERNAL MEDICINE

## 2023-07-28 PROCEDURE — 82043 UR ALBUMIN QUANTITATIVE: CPT | Performed by: INTERNAL MEDICINE

## 2023-08-01 ENCOUNTER — OFFICE VISIT (OUTPATIENT)
Dept: INTERNAL MEDICINE | Facility: CLINIC | Age: 63
End: 2023-08-01
Payer: MEDICARE

## 2023-08-01 VITALS
TEMPERATURE: 98.6 F | RESPIRATION RATE: 16 BRPM | BODY MASS INDEX: 22.96 KG/M2 | OXYGEN SATURATION: 98 % | HEIGHT: 69 IN | DIASTOLIC BLOOD PRESSURE: 81 MMHG | WEIGHT: 155 LBS | HEART RATE: 85 BPM | SYSTOLIC BLOOD PRESSURE: 162 MMHG

## 2023-08-01 DIAGNOSIS — E78.2 MIXED HYPERLIPIDEMIA: ICD-10-CM

## 2023-08-01 DIAGNOSIS — D64.9 ANEMIA, UNSPECIFIED TYPE: ICD-10-CM

## 2023-08-01 DIAGNOSIS — E11.9 DIABETES MELLITUS WITHOUT COMPLICATION: ICD-10-CM

## 2023-08-01 DIAGNOSIS — I10 BENIGN ESSENTIAL HYPERTENSION: Primary | ICD-10-CM

## 2023-08-01 DIAGNOSIS — M25.522 PAIN IN JOINT OF LEFT ELBOW: ICD-10-CM

## 2023-08-01 DIAGNOSIS — Z12.11 COLON CANCER SCREENING: ICD-10-CM

## 2023-08-01 RX ORDER — GLIMEPIRIDE 2 MG/1
2 TABLET ORAL
Qty: 90 TABLET | Refills: 1 | Status: SHIPPED | OUTPATIENT
Start: 2023-08-01

## 2023-08-01 RX ORDER — CARVEDILOL 12.5 MG/1
12.5 TABLET ORAL 2 TIMES DAILY WITH MEALS
Qty: 60 TABLET | Refills: 5 | Status: SHIPPED | OUTPATIENT
Start: 2023-08-01

## 2023-08-01 RX ORDER — ATORVASTATIN CALCIUM 80 MG/1
80 TABLET, FILM COATED ORAL DAILY
Qty: 90 TABLET | Refills: 1 | Status: SHIPPED | OUTPATIENT
Start: 2023-08-01

## 2023-08-01 RX ORDER — LISINOPRIL 10 MG/1
10 TABLET ORAL 2 TIMES DAILY
Qty: 60 TABLET | Refills: 5 | Status: SHIPPED | OUTPATIENT
Start: 2023-08-01

## 2023-08-01 NOTE — PROGRESS NOTES
"Chief Complaint  Hypertension, Hyperlipidemia, and Diabetes    Subjective        Tristan Castle presents to [facility]  HPI: Patient is here to follow up on the blood pressure  which is elevated ,  The patient is taking the blood pressure medications as prescribed and has had no side effects. The patient is also here to follow up on the cholesterol and is trying to follow a diet. The patient is  also here to follow up on  sugar  and had  lab work done .  The patient also needs refills on medications .  He is due colon cancer screening , he complains of pain in the left elbow area  Hyperlipidemia   Pertinent negatives include no chest pain or shortness of breath.   Hypertension   Pertinent negatives include no chest pain, palpitations or shortness of breath.    Hypertension      Objective   Vital Signs:  /81   Pulse 85   Temp 98.6 øF (37 øC)   Resp 16   Ht 175.3 cm (69.02\")   Wt 70.3 kg (155 lb)   SpO2 98%   BMI 22.88 kg/mý   Estimated body mass index is 22.88 kg/mý as calculated from the following:    Height as of this encounter: 175.3 cm (69.02\").    Weight as of this encounter: 70.3 kg (155 lb).       BMI is within normal parameters. No other follow-up for BMI required.      Physical Exam  Vitals and nursing note reviewed.   Constitutional:       General: He is not in acute distress.     Appearance: Normal appearance. He is not diaphoretic.   HENT:      Head: Normocephalic and atraumatic.      Right Ear: External ear normal.      Left Ear: External ear normal.      Nose: Nose normal.   Eyes:      Extraocular Movements: Extraocular movements intact.      Conjunctiva/sclera: Conjunctivae normal.   Neck:      Trachea: Trachea normal.   Cardiovascular:      Rate and Rhythm: Normal rate and regular rhythm.      Heart sounds: Normal heart sounds.   Pulmonary:      Effort: Pulmonary effort is normal. No respiratory distress.   Abdominal:      General: Abdomen is flat. "   Musculoskeletal:      Cervical back: Neck supple.      Comments: Moves all limbs   Skin:     General: Skin is warm and dry.      Findings: No erythema.   Neurological:      Mental Status: He is alert and oriented to person, place, and time.      Comments: No gross motor or sensory deficits      Result Review :  The following data was reviewed by: Marybel Fleming MD on 08/01/2023:  Common labs          7/28/2023    08:50   Common Labs   Glucose 117    BUN 31    Creatinine 1.07    Sodium 137    Potassium 4.1    Chloride 102    Calcium 9.1    Albumin 4.0    Total Bilirubin 0.4    Alkaline Phosphatase 71    AST (SGOT) 18    ALT (SGPT) 17    WBC 7.48    Hemoglobin 12.7    Hematocrit 36.4    Platelets 310    Total Cholesterol 111    Triglycerides 71    HDL Cholesterol 40    LDL Cholesterol  56    Hemoglobin A1C 6.50    Microalbumin, Urine <1.2                   Assessment and Plan   Diagnoses and all orders for this visit:    1. Benign essential hypertension (Primary)  -     lisinopril (PRINIVIL,ZESTRIL) 10 MG tablet; Take 1 tablet by mouth 2 (Two) Times a Day.  Dispense: 60 tablet; Refill: 5  -     carvedilol (Coreg) 12.5 MG tablet; Take 1 tablet by mouth 2 (Two) Times a Day With Meals.  Dispense: 60 tablet; Refill: 5    2. Mixed hyperlipidemia  -     atorvastatin (LIPITOR) 80 MG tablet; Take 1 tablet by mouth Daily.  Dispense: 90 tablet; Refill: 1  -     CBC & Differential  -     Comprehensive Metabolic Panel  -     Lipid Panel    3. Diabetes mellitus without complication  -     glimepiride (Amaryl) 2 MG tablet; Take 1 tablet by mouth Daily With Lunch.  Dispense: 90 tablet; Refill: 1  -     Hemoglobin A1c  -     Microalbumin / Creatinine Urine Ratio - Urine, Clean Catch    4. Colon cancer screening  -     Cologuard - Stool, Per Rectum; Future    5. Pain in joint of left elbow  -     Ambulatory Referral to Orthopedic Surgery  -     XR elbow 2 vw left    6. Anemia, unspecified type      Plan:  1.  Benign essential  hypertension: Will continue current medication, low-sodium diet advised, Counseled to regularly check BP at home with goal averaging <130/80.   2.mixed hyperlipidemia: will obtain   fasting CMP and lipid panel.  Diet and exercise counseled,  Will continue current medications  3. Diabetes  Mellitus  : reviewed fasting CMP  and hba1c   6.5, diet and exercise counseled , Will start amaryl  4.  Pain in left elbow: will get xray and refer to orthopedist  5. Anemia : monitor cbc  6.screening for colon cancer : order cologard     Follow Up   Return in about 3 months (around 11/9/2023).  Patient was given instructions and counseling regarding his condition or for health maintenance advice. Please see specific information pulled into the AVS if appropriate.

## 2023-08-22 DIAGNOSIS — R19.5 POSITIVE COLORECTAL CANCER SCREENING USING COLOGUARD TEST: Primary | ICD-10-CM

## 2023-08-30 DIAGNOSIS — M25.522 ARTHRALGIA OF ELBOW, LEFT: Primary | ICD-10-CM

## 2023-08-31 ENCOUNTER — OFFICE VISIT (OUTPATIENT)
Dept: ORTHOPEDIC SURGERY | Facility: CLINIC | Age: 63
End: 2023-08-31
Payer: MEDICARE

## 2023-08-31 VITALS
BODY MASS INDEX: 23.25 KG/M2 | HEART RATE: 85 BPM | WEIGHT: 157 LBS | SYSTOLIC BLOOD PRESSURE: 153 MMHG | DIASTOLIC BLOOD PRESSURE: 82 MMHG | HEIGHT: 69 IN

## 2023-08-31 DIAGNOSIS — M77.12 LATERAL EPICONDYLITIS OF LEFT ELBOW: ICD-10-CM

## 2023-08-31 DIAGNOSIS — M25.522 ARTHRALGIA OF ELBOW, LEFT: Primary | ICD-10-CM

## 2023-08-31 PROCEDURE — 99203 OFFICE O/P NEW LOW 30 MIN: CPT | Performed by: PHYSICIAN ASSISTANT
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
## 2023-09-05 ENCOUNTER — PATIENT ROUNDING (BHMG ONLY) (OUTPATIENT)
Dept: ORTHOPEDIC SURGERY | Facility: CLINIC | Age: 63
End: 2023-09-05
Payer: MEDICARE

## 2023-09-05 NOTE — PROGRESS NOTES
A uControl message has been sent to the patient for patient rounding with [facility].

## 2023-09-07 ENCOUNTER — OFFICE VISIT (OUTPATIENT)
Dept: GASTROENTEROLOGY | Facility: CLINIC | Age: 63
End: 2023-09-07
Payer: MEDICARE

## 2023-09-07 VITALS
HEART RATE: 64 BPM | OXYGEN SATURATION: 99 % | DIASTOLIC BLOOD PRESSURE: 76 MMHG | BODY MASS INDEX: 22.88 KG/M2 | WEIGHT: 155 LBS | SYSTOLIC BLOOD PRESSURE: 142 MMHG

## 2023-09-07 DIAGNOSIS — R19.5 POSITIVE COLORECTAL CANCER SCREENING USING COLOGUARD TEST: Primary | ICD-10-CM

## 2023-09-07 DIAGNOSIS — R63.4 WEIGHT LOSS, ABNORMAL: ICD-10-CM

## 2023-09-07 DIAGNOSIS — D64.9 NORMOCYTIC ANEMIA: ICD-10-CM

## 2023-09-07 PROCEDURE — 1159F MED LIST DOCD IN RCRD: CPT | Performed by: PHYSICIAN ASSISTANT

## 2023-09-07 PROCEDURE — 3078F DIAST BP <80 MM HG: CPT | Performed by: PHYSICIAN ASSISTANT

## 2023-09-07 PROCEDURE — 99214 OFFICE O/P EST MOD 30 MIN: CPT | Performed by: PHYSICIAN ASSISTANT

## 2023-09-07 PROCEDURE — 3077F SYST BP >= 140 MM HG: CPT | Performed by: PHYSICIAN ASSISTANT

## 2023-09-07 PROCEDURE — 1160F RVW MEDS BY RX/DR IN RCRD: CPT | Performed by: PHYSICIAN ASSISTANT

## 2023-09-07 RX ORDER — SODIUM CHLORIDE 9 MG/ML
30 INJECTION, SOLUTION INTRAVENOUS CONTINUOUS PRN
OUTPATIENT
Start: 2023-09-07
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
## 2023-09-11 PROBLEM — R19.5 POSITIVE COLORECTAL CANCER SCREENING USING COLOGUARD TEST: Status: ACTIVE | Noted: 2023-09-11

## 2023-10-25 ENCOUNTER — ANESTHESIA (OUTPATIENT)
Dept: GASTROENTEROLOGY | Facility: HOSPITAL | Age: 63
End: 2023-10-25
Payer: MEDICARE

## 2023-10-25 ENCOUNTER — ANESTHESIA EVENT (OUTPATIENT)
Dept: GASTROENTEROLOGY | Facility: HOSPITAL | Age: 63
End: 2023-10-25
Payer: MEDICARE

## 2023-10-25 ENCOUNTER — HOSPITAL ENCOUNTER (OUTPATIENT)
Facility: HOSPITAL | Age: 63
Setting detail: HOSPITAL OUTPATIENT SURGERY
Discharge: HOME | End: 2023-10-25
Attending: INTERNAL MEDICINE | Admitting: INTERNAL MEDICINE
Payer: MEDICARE

## 2023-10-25 VITALS
OXYGEN SATURATION: 97 % | HEART RATE: 67 BPM | SYSTOLIC BLOOD PRESSURE: 145 MMHG | HEIGHT: 68 IN | WEIGHT: 155 LBS | TEMPERATURE: 97.6 F | BODY MASS INDEX: 23.49 KG/M2 | DIASTOLIC BLOOD PRESSURE: 71 MMHG | RESPIRATION RATE: 16 BRPM

## 2023-10-25 DIAGNOSIS — R19.5 POSITIVE COLORECTAL CANCER SCREENING USING COLOGUARD TEST: ICD-10-CM

## 2023-10-25 LAB — GLUCOSE BLDC GLUCOMTR-MCNC: 130 MG/DL (ref 70–130)

## 2023-10-25 PROCEDURE — 25010000002 PROPOFOL 200 MG/20ML EMULSION: Performed by: NURSE ANESTHETIST, CERTIFIED REGISTERED

## 2023-10-25 PROCEDURE — 82948 REAGENT STRIP/BLOOD GLUCOSE: CPT

## 2023-10-25 PROCEDURE — 25810000003 SODIUM CHLORIDE 0.9 % SOLUTION: Performed by: PHYSICIAN ASSISTANT

## 2023-10-25 PROCEDURE — 25010000002 PROPOFOL 10 MG/ML EMULSION: Performed by: NURSE ANESTHETIST, CERTIFIED REGISTERED

## 2023-10-25 RX ORDER — PROPOFOL 10 MG/ML
INJECTION, EMULSION INTRAVENOUS AS NEEDED
Status: DISCONTINUED | OUTPATIENT
Start: 2023-10-25 | End: 2023-10-25 | Stop reason: SURG

## 2023-10-25 RX ORDER — PANTOPRAZOLE SODIUM 40 MG/1
40 TABLET, DELAYED RELEASE ORAL DAILY
Qty: 30 TABLET | Refills: 2 | Status: SHIPPED | OUTPATIENT
Start: 2023-10-25

## 2023-10-25 RX ORDER — SODIUM CHLORIDE 9 MG/ML
30 INJECTION, SOLUTION INTRAVENOUS CONTINUOUS PRN
Status: DISCONTINUED | OUTPATIENT
Start: 2023-10-25 | End: 2023-10-25 | Stop reason: HOSPADM

## 2023-10-25 RX ORDER — KETAMINE HCL IN NACL, ISO-OSM 100MG/10ML
SYRINGE (ML) INJECTION AS NEEDED
Status: DISCONTINUED | OUTPATIENT
Start: 2023-10-25 | End: 2023-10-25 | Stop reason: SURG

## 2023-10-25 RX ORDER — LIDOCAINE HCL/PF 100 MG/5ML
SYRINGE (ML) INJECTION AS NEEDED
Status: DISCONTINUED | OUTPATIENT
Start: 2023-10-25 | End: 2023-10-25 | Stop reason: SURG

## 2023-10-25 RX ORDER — SIMETHICONE 20 MG/.3ML
EMULSION ORAL AS NEEDED
Status: DISCONTINUED | OUTPATIENT
Start: 2023-10-25 | End: 2023-10-25 | Stop reason: HOSPADM

## 2023-10-25 RX ADMIN — Medication 20 MG: at 12:27

## 2023-10-25 RX ADMIN — PROPOFOL 100 MG: 10 INJECTION, EMULSION INTRAVENOUS at 12:27

## 2023-10-25 RX ADMIN — LIDOCAINE HYDROCHLORIDE 40 MG: 20 INJECTION INTRAVENOUS at 12:27

## 2023-10-25 RX ADMIN — PROPOFOL 120 MCG/KG/MIN: 10 INJECTION, EMULSION INTRAVENOUS at 12:27

## 2023-10-25 RX ADMIN — SODIUM CHLORIDE 30 ML/HR: 9 INJECTION, SOLUTION INTRAVENOUS at 11:51
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
## 2023-10-25 NOTE — ANESTHESIA POSTPROCEDURE EVALUATION
Patient: Tristan Castle    Procedure Summary       Date: 10/25/23 Room / Location: [facility] ENDOSCOPY 2 / [facility] ENDOSCOPY    Anesthesia Start: 1218 Anesthesia Stop: 1307    Procedures:       ESOPHAGOGASTRODUODENOSCOPY WITH BIOPSY      COLONOSCOPY, polypectomy x1 and colon cleansing (Anus) Diagnosis:       Positive colorectal cancer screening using Cologuard test      (Positive colorectal cancer screening using Cologuard test [R19.5])    Surgeons: Aida Ely MD Provider: Klever Ramirez CRNA    Anesthesia Type: MAC ASA Status: 3            Anesthesia Type: MAC    Vitals  HR 64  Sat 97  Resp 12  Temp 98  /65        Post Anesthesia Care and Evaluation    Patient location during evaluation: bedside  Patient participation: complete - patient participated  Level of consciousness: awake and alert and sleepy but conscious  Pain score: 0  Pain management: adequate    Airway patency: patent  Anesthetic complications: No anesthetic complications  PONV Status: none  Cardiovascular status: acceptable  Respiratory status: acceptable  Hydration status: acceptable

## 2023-10-25 NOTE — DISCHARGE INSTRUCTIONS
No pushing, pulling, tugging,  heavy lifting, or strenuous activity.  No major decision making, driving, or drinking alcoholic beverages for 24 hours. ( due to the medications you have  received)  Always use good hand hygiene/washing techniques.  NO driving while taking pain medications.    * if you have an incision:  Check your incision area every day for signs of infection.   Check for:  * more redness, swelling, or pain  *more fluid or blood  *warmth  *pus or bad smellTo assist you in voiding:  Drink plenty of fluids  Listen to running water while attempting to void.    If you are unable to urinate and you have an uncomfortable urge to void or it has been   6 hours since you were discharged, return to the Emergency Room    DC home  High fiber diet  PPI daily  Abstinence from smoking  Hold ASA for 48 hours  Avoid NSAIDS  Repeat colonoscopy in 1 year because the bowel preparation was poor

## 2023-10-25 NOTE — ANESTHESIA PREPROCEDURE EVALUATION
Anesthesia Evaluation     Patient summary reviewed and Nursing notes reviewed   no history of anesthetic complications:   NPO Solid Status: > 8 hours  NPO Liquid Status: > 2 hours           Airway   Mallampati: II  TM distance: >3 FB  Neck ROM: full  Possible difficult intubation  Dental - normal exam     Pulmonary - normal exam   (+) a smoker Current Smoked day of surgery, COPD, asthma,shortness of breath, sleep apnea  Cardiovascular - normal exam    ECG reviewed  PT is on anticoagulation therapy  Patient on routine beta blocker    (+) hypertension, past MI , CAD, cardiac stents more than 12 months ago , CHF , PVD, hyperlipidemia    ROS comment: Echo 12/2021:  · The left ventricular cavity is mildly dilated.  · Estimated left ventricular EF = 45% Left ventricular ejection fraction appears to be 41 - 45%. Left ventricular systolic function is low normal.  · Left ventricular diastolic function is consistent with (grade I) impaired relaxation.  · Mild aortic valve stenosis is present.  · Estimated right ventricular systolic pressure from tricuspid regurgitation is normal (<35 mmHg). Calculated right ventricular systolic pressure from tricuspid regurgitation is 15 mmHg.      Neuro/Psych  (+) psychiatric history Anxiety and Depression  GI/Hepatic/Renal/Endo    (+) diabetes mellitus    Musculoskeletal     Abdominal    Substance History   (+) alcohol use  (-) drug use     OB/GYN          Other   arthritis,     ROS/Med Hx Other: RLS                Anesthesia Plan    ASA 3     MAC     (Risks and benefits discussed including risk of aspiration, recall and dental damage. All patient questions answered.    Will continue with plan of care.)  intravenous induction     Anesthetic plan, risks, benefits, and alternatives have been provided, discussed and informed consent has been obtained with: patient.  Pre-procedure education provided    CODE STATUS:

## 2023-10-27 LAB — REF LAB TEST METHOD: NORMAL

## 2023-11-09 ENCOUNTER — OFFICE VISIT (OUTPATIENT)
Dept: INTERNAL MEDICINE | Facility: CLINIC | Age: 63
End: 2023-11-09
Payer: MEDICARE

## 2023-11-09 VITALS
BODY MASS INDEX: 23.69 KG/M2 | WEIGHT: 155.8 LBS | DIASTOLIC BLOOD PRESSURE: 76 MMHG | SYSTOLIC BLOOD PRESSURE: 148 MMHG | OXYGEN SATURATION: 96 % | TEMPERATURE: 98.4 F | HEART RATE: 62 BPM

## 2023-11-09 DIAGNOSIS — Z00.00 MEDICARE ANNUAL WELLNESS VISIT, SUBSEQUENT: Primary | ICD-10-CM

## 2023-11-09 DIAGNOSIS — L30.8 PRURITIC DERMATITIS: ICD-10-CM

## 2023-11-09 DIAGNOSIS — I10 BENIGN ESSENTIAL HYPERTENSION: ICD-10-CM

## 2023-11-09 DIAGNOSIS — Z23 NEEDS FLU SHOT: ICD-10-CM

## 2023-11-09 DIAGNOSIS — E11.9 DIABETES MELLITUS WITHOUT COMPLICATION: ICD-10-CM

## 2023-11-09 DIAGNOSIS — E78.2 MIXED HYPERLIPIDEMIA: ICD-10-CM

## 2023-11-09 PROCEDURE — 1160F RVW MEDS BY RX/DR IN RCRD: CPT | Performed by: INTERNAL MEDICINE

## 2023-11-09 PROCEDURE — 3044F HG A1C LEVEL LT 7.0%: CPT | Performed by: INTERNAL MEDICINE

## 2023-11-09 PROCEDURE — 3078F DIAST BP <80 MM HG: CPT | Performed by: INTERNAL MEDICINE

## 2023-11-09 PROCEDURE — G0008 ADMIN INFLUENZA VIRUS VAC: HCPCS | Performed by: INTERNAL MEDICINE

## 2023-11-09 PROCEDURE — 90686 IIV4 VACC NO PRSV 0.5 ML IM: CPT | Performed by: INTERNAL MEDICINE

## 2023-11-09 PROCEDURE — 1159F MED LIST DOCD IN RCRD: CPT | Performed by: INTERNAL MEDICINE

## 2023-11-09 PROCEDURE — G0439 PPPS, SUBSEQ VISIT: HCPCS | Performed by: INTERNAL MEDICINE

## 2023-11-09 PROCEDURE — 3077F SYST BP >= 140 MM HG: CPT | Performed by: INTERNAL MEDICINE

## 2023-11-09 RX ORDER — TRIAMCINOLONE ACETONIDE 1 MG/G
1 CREAM TOPICAL 2 TIMES DAILY
Qty: 15 G | Refills: 5 | Status: SHIPPED | OUTPATIENT
Start: 2023-11-09

## 2023-11-09 NOTE — PATIENT INSTRUCTIONS
Advance Care Planning and Advance Directives     You make decisions on a daily basis - decisions about where you want to live, your career, your home, your life. Perhaps one of the most important decisions you face is your choice for future medical care. Take time to talk with your family and your healthcare team and start planning today.  Advance Care Planning is a process that can help you:  Understand possible future healthcare decisions in light of your own experiences  Reflect on those decision in light of your goals and values  Discuss your decisions with those closest to you and the healthcare professionals that care for you  Make a plan by creating a document that reflects your wishes    Surrogate Decision Maker  In the event of a medical emergency, which has left you unable to communicate or to make your own decisions, you would need someone to make decisions for you.  It is important to discuss your preferences for medical treatment with this person while you are in good health.     Qualities of a surrogate decision maker:  Willing to take on this role and responsibility  Knows what you want for future medical care  Willing to follow your wishes even if they don't agree with them  Able to make difficult medical decisions under stressful circumstances    Advance Directives  These are legal documents you can create that will guide your healthcare team and decision maker(s) when needed. These documents can be stored in the electronic medical record.    Living Will - a legal document to guide your care if you have a terminal condition or a serious illness and are unable to communicate. States vary by statute in document names/types, but most forms may include one or more of the following:        -  Directions regarding life-prolonging treatments        -  Directions regarding artificially provided nutrition/hydration        -  Choosing a healthcare decision maker        -  Direction regarding organ/tissue  donation    Durable Power of  for Healthcare - this document names an -in-fact to make medical decisions for you, but it may also allow this person to make personal and financial decisions for you. Please seek the advice of an  if you need this type of document.    **Advance Directives are not required and no one may discriminate against you if you do not sign one.    Medical Orders  Many states allow specific forms/orders signed by your physician to record your wishes for medical treatment in your current state of health. This form, signed in personal communication with your physician, addresses resuscitation and other medical interventions that you may or may not want.      For more information or to schedule a time with a [facility] Advance Care Planning Facilitator contact: [facility] or call [phone] and someone will contact you directly.    Medicare Wellness  Personal Prevention Plan of Service     Date of Office Visit:    Encounter Provider:  Marybel Fleming MD  Place of Service:  [facility] PRIMARY CARE  Patient Name: Tristan Castle  :  1960    As part of the Medicare Wellness portion of your visit today, we are providing you with this personalized preventive plan of services (PPPS). This plan is based upon recommendations of the [place] Preventive Services Task Force (USPSTF) and the Advisory Committee on Immunization Practices (ACIP).    This lists the preventive care services that should be considered, and provides dates of when you are due. Items listed as completed are up-to-date and do not require any further intervention.    Health Maintenance   Topic Date Due   • HEPATITIS C SCREENING  Never done   • DIABETIC FOOT EXAM  Never done   • DIABETIC EYE EXAM  Never done   • LUNG CANCER SCREENING  09/15/2022   • COVID-19 Vaccine ( season) 2023   • COLORECTAL CANCER SCREENING  10/26/2023   • ZOSTER VACCINE (1 of 2)  08/26/2024 (Originally 2/10/2010)   • ANNUAL WELLNESS VISIT  12/28/2023   • HEMOGLOBIN A1C  01/28/2024   • LIPID PANEL  07/28/2024   • URINE MICROALBUMIN  07/28/2024   • TDAP/TD VACCINES (2 - Td or Tdap) 11/05/2030   • Pneumococcal Vaccine 0-64  Completed   • INFLUENZA VACCINE  Completed       Orders Placed This Encounter   Procedures   • Fluzone (or Fluarix & Flulaval for VFC) >6mos       No follow-ups on file.

## 2023-11-09 NOTE — PROGRESS NOTES
The ABCs of the Annual Wellness Visit  Subsequent Medicare Wellness Visit  Chief Complaint   Patient presents with    Medicare Wellness-subsequent    Hypertension    Hyperlipidemia    Diabetes       Subjective      Tristan Castle is a 63 y.o. male who presents for a Subsequent Medicare Wellness Visit.   Patient is here to follow up on the blood pressure  The patient is taking the blood pressure medications as prescribed and has had no side effects. The patient is also here to follow up on the cholesterol and is trying to follow a diet. The patient is  also here to follow up on sugar and is  due to get lab work done .  The patient also needs flu vaccine.  He complains of a rash all over the body which is itching  Hyperlipidemia   Pertinent negatives include no chest pain or shortness of breath.   Hypertension   Pertinent negatives include no chest pain, palpitations or shortness of breath.      The following portions of the patient's history were reviewed and   updated as appropriate: allergies, current medications, past family history, past medical history, past social history, past surgical history, and problem list.    Compared to one year ago, the patient feels his physical   health is the same.    Compared to one year ago, the patient feels his mental   health is the same.    Recent Hospitalizations:  He was not admitted to the hospital during the last year.       Current Medical Providers:  Patient Care Team:  Marybel Fleming MD as PCP - General (Internal Medicine)    Outpatient Medications Prior to Visit   Medication Sig Dispense Refill    albuterol sulfate  (90 Base) MCG/ACT inhaler Inhale 2 puffs Every 4 (Four) Hours As Needed for Wheezing. 108 g 3    aspirin (aspirin) 81 MG EC tablet Take 1 tablet by mouth Daily. 90 tablet 3    atorvastatin (LIPITOR) 80 MG tablet Take 1 tablet by mouth Daily. 90 tablet 1    carvedilol (Coreg) 12.5 MG tablet Take 1 tablet by mouth 2 (Two) Times a Day With Meals. 60 tablet  5    cilostazol (PLETAL) 50 MG tablet Take 1 tablet by mouth 2 (Two) Times a Day. 180 tablet 3    glimepiride (Amaryl) 2 MG tablet Take 1 tablet by mouth Daily With Lunch. 90 tablet 1    lisinopril (PRINIVIL,ZESTRIL) 10 MG tablet Take 1 tablet by mouth 2 (Two) Times a Day. 60 tablet 5    pantoprazole (PROTONIX) 40 MG EC tablet Take 1 tablet by mouth Daily. 30 tablet 2     No facility-administered medications prior to visit.       No opioid medication identified on active medication list. I have reviewed chart for other potential  high risk medication/s and harmful drug interactions in the elderly.        Aspirin is on active medication list. Aspirin use is indicated based on review of current medical condition/s. Pros and cons of this therapy have been discussed today. Benefits of this medication outweigh potential harm.  Patient has been encouraged to continue taking this medication.  .      Patient Active Problem List   Diagnosis    Benign essential HTN    SOB (shortness of breath)    Tobacco abuse    Centrilobular emphysema    Chronic systolic congestive heart failure    Coronary artery disease involving coronary bypass graft of native heart without angina pectoris    Abnormal fasting glucose    Anxiety disorder    Avitaminosis D    Chronic pain associated with significant psychosocial dysfunction    Hyperlipidemia    Depression, major, recurrent    Fatigue    LBP (low back pain)    Nerve pain    Poor appetite    Weight loss, abnormal    PAD (peripheral artery disease)    Positive colorectal cancer screening using Cologuard test     Advance Care Planning   Advance Care Planning     Advance Directive is not on file.  ACP discussion was held with the patient during this visit. Patient does not have an advance directive, information provided.     Objective    Vitals:    11/09/23 1300   BP: 148/76   Pulse: 62   Temp: 98.4 °F (36.9 °C)   TempSrc: Temporal   SpO2: 96%   Weight: 70.7 kg (155 lb 12.8 oz)   PainSc: 0-No  "pain     Estimated body mass index is 23.69 kg/m² as calculated from the following:    Height as of 10/24/23: 172.7 cm (68\").    Weight as of this encounter: 70.7 kg (155 lb 12.8 oz).    BMI is within normal parameters. No other follow-up for BMI required.       Physical Examination  Vitals and nursing note reviewed  General appearance: Normocephalic and nontraumatic  HEENT: External inspection of eyes, ears and nose appears benign, hearing appears intact  Neck: Neck appears supple, trachea in midline, thyroid appears not enlarged  Respiratory: Respiratory effort appears normal  Musculoskeletal: Moving all limbs  Range of motion of visible joints appears within normal  CNS: No gross motor or sensory deficits  No gross cranial nerve deficits  No tremors  Psychiatry: Alert and oriented x3  Memory appears intact  Mood and affect appears normal  Insight appears normal   Skin: Maculopapular rash all over body    Does the patient have evidence of cognitive impairment?   No            HEALTH RISK ASSESSMENT    Smoking Status:  Social History     Tobacco Use   Smoking Status Light Smoker    Packs/day: 0.25    Years: 50.00    Additional pack years: 0.00    Total pack years: 12.50    Types: Cigarettes    Start date: 1971   Smokeless Tobacco Never     Alcohol Consumption:  Social History     Substance and Sexual Activity   Alcohol Use Yes    Alcohol/week: 2.0 standard drinks of alcohol    Types: 2 Drinks containing 0.5 oz of alcohol per week    Comment: Per every two weeks     Fall Risk Screen:    LOPEZ Fall Risk Assessment was completed, and patient is at LOW risk for falls.Assessment completed on:2023    Depression Screenin/9/2023     1:00 PM   PHQ-2/PHQ-9 Depression Screening   Little Interest or Pleasure in Doing Things 0-->not at all   Feeling Down, Depressed or Hopeless 0-->not at all   PHQ-9: Brief Depression Severity Measure Score 0       Health Habits and Functional and Cognitive Screening:      " 11/9/2023     1:00 PM   Functional & Cognitive Status   Do you have difficulty preparing food and eating? No   Do you have difficulty bathing yourself, getting dressed or grooming yourself? No   Do you have difficulty using the toilet? No   Do you have difficulty moving around from place to place? No   Do you have trouble with steps or getting out of a bed or a chair? No   Current Diet Diabetic Diet   Dental Exam Up to date   Eye Exam Up to date   Exercise (times per week) 7 times per week   Current Exercises Include Walking;Yard Work;Gardening;Aerobics   Do you need help using the phone?  No   Are you deaf or do you have serious difficulty hearing?  No   Do you need help to go to places out of walking distance? No   Do you need help shopping? No   Do you need help preparing meals?  No   Do you need help with housework?  No   Do you need help with laundry? No   Do you need help taking your medications? No   Do you need help managing money? No   Do you ever drive or ride in a car without wearing a seat belt? No   Have you felt unusual stress, anger or loneliness in the last month? No   Who do you live with? Spouse   If you need help, do you have trouble finding someone available to you? No   Have you been bothered in the last four weeks by sexual problems? No   Do you have difficulty concentrating, remembering or making decisions? No       Age-appropriate Screening Schedule:  Refer to the list below for future screening recommendations based on patient's age, sex and/or medical conditions. Orders for these recommended tests are listed in the plan section. The patient has been provided with a written plan.    Health Maintenance   Topic Date Due    HEPATITIS C SCREENING  Never done    DIABETIC FOOT EXAM  Never done    DIABETIC EYE EXAM  Never done    COVID-19 Vaccine (4 - 2023-24 season) 09/01/2023    COLORECTAL CANCER SCREENING  10/26/2023    ZOSTER VACCINE (1 of 2) 08/26/2024 (Originally 2/10/2010)    HEMOGLOBIN A1C   01/28/2024    LIPID PANEL  07/28/2024    URINE MICROALBUMIN  07/28/2024    ANNUAL WELLNESS VISIT  11/09/2024    TDAP/TD VACCINES (2 - Td or Tdap) 11/05/2030    Pneumococcal Vaccine 0-64  Completed    INFLUENZA VACCINE  Completed                  CMS Preventative Services Quick Reference  Risk Factors Identified During Encounter:    Immunizations Discussed/Encouraged: Influenza    The above risks/problems have been discussed with the patient.  Pertinent information has been shared with the patient in the After Visit Summary.    Diagnoses and all orders for this visit:    1. Medicare annual wellness visit, subsequent (Primary)    2. Needs flu shot  -     Fluzone (or Fluarix & Flulaval for VFC) >6mos    3. Benign essential hypertension    4. Mixed hyperlipidemia  -     CBC & Differential  -     Comprehensive Metabolic Panel  -     Lipid Panel    5. Diabetes mellitus without complication  -     Hemoglobin A1c  -     Microalbumin / Creatinine Urine Ratio - Urine, Clean Catch    6. Pruritic dermatitis  -     Ambulatory Referral to Dermatology  -     triamcinolone (KENALOG) 0.1 % cream; Apply 1 application  topically to the appropriate area as directed 2 (Two) Times a Day.  Dispense: 15 g; Refill: 5    Plan:  1.physical: Physical exam conducted today, obtain fasting lab work,   Impression: healthy adult Patient. Currently, patient eats a healthy diet. Screening lab work includes glucose, lipid profile and complete blood count . The risks and benefits of immunizations were discussed and immunizations are up to date. Advice and education were given regarding nutrition and aerobic exercise. Patient discussion: discussed with the patient.  Annual Wellness Visit  with preventive exam as well as age and risk appropriate counseling completed.   Advance Directive Planning: paperwork and instructions were given to the patient.   Patient Discussion: plan discussed with the patient, follow-up visit needed in one year. Medication  Review and medication list updated  2.  Benign essential hypertension: Will continue current medication, low-sodium diet advised, Counseled to regularly check BP at home with goal averaging <130/80.   3. .mixed hyperlipidemia: will obtain   fasting CMP and lipid panel.  Diet and exercise counseled,  Will continue current medications  4. Diabetes  Mellitus  : will obtain   fasting CMP  and hba1c  , diet and exercise counseled , Will continue current medications  5. Need for flu vaccine : given today and well tolerated  6.  Pruritic dermatitis: Topical Kenalog, advised to use Aveeno or Dove soap and Cetaphil lotion, will refer patient to dermatology    Follow Up:   Next Medicare Wellness visit to be scheduled in 1 year.      An After Visit Summary and PPPS were made available to the patient.

## 2023-12-06 ENCOUNTER — OFFICE VISIT (OUTPATIENT)
Dept: CARDIOLOGY | Facility: CLINIC | Age: 63
End: 2023-12-06
Payer: MEDICARE

## 2023-12-06 VITALS
OXYGEN SATURATION: 100 % | BODY MASS INDEX: 23.79 KG/M2 | HEIGHT: 68 IN | WEIGHT: 157 LBS | DIASTOLIC BLOOD PRESSURE: 76 MMHG | HEART RATE: 89 BPM | SYSTOLIC BLOOD PRESSURE: 122 MMHG | RESPIRATION RATE: 16 BRPM

## 2023-12-06 DIAGNOSIS — J43.2 CENTRILOBULAR EMPHYSEMA: Primary | ICD-10-CM

## 2023-12-06 DIAGNOSIS — E78.00 PURE HYPERCHOLESTEROLEMIA: ICD-10-CM

## 2023-12-06 DIAGNOSIS — I50.22 CHRONIC SYSTOLIC CONGESTIVE HEART FAILURE: ICD-10-CM

## 2023-12-06 DIAGNOSIS — I25.810 CORONARY ARTERY DISEASE INVOLVING CORONARY BYPASS GRAFT OF NATIVE HEART WITHOUT ANGINA PECTORIS: ICD-10-CM

## 2023-12-06 DIAGNOSIS — I10 BENIGN ESSENTIAL HTN: ICD-10-CM

## 2023-12-06 DIAGNOSIS — I73.9 PAD (PERIPHERAL ARTERY DISEASE): ICD-10-CM

## 2023-12-06 DIAGNOSIS — Z72.0 TOBACCO ABUSE: ICD-10-CM

## 2023-12-06 NOTE — PROGRESS NOTES
[facility] Cardiology Office Follow Up Note    Tristan Castle  [id number]  2023    Primary Care Provider: Marybel Fleming MD    Chief Complaint: Routine follow-up    History of Present Illness:   Mr. Tristan Castle is a 63 y.o. male who presents to the Cardiology Clinic for follow up of coronary artery disease.  The patient has a past medical history significant for hypertension, hyperlipidemia, and chronic tobacco use.  He has a past cardiac history significant for presentation with an NSTEMI in .  At that time, he was found to have severe two-vessel coronary artery disease with severe stenosis in the mid LCx as well as chronic total occlusion of the proximal RCA.  He underwent PCI to the LCx with placement of overlapping JAXSON.  An echocardiogram showed dilated ischemic cardiomyopathy with an LVEF 30-35% as well as mild aortic stenosis.    He also has a history of peripheral arterial disease, with a CTA in  showing occlusion of the proximal left common iliac artery with reconstitution distally via collateralization.  He was also noted to have 70% stenosis in the right common iliac artery.  Today, the patient reports he has been doing well from a cardiac standpoint since his last follow-up appointment.  He denies any significant chest pain or exertional chest discomfort.  He does have exertional dyspnea, in the setting of underlying COPD.  He does have mild lower extremity discomfort with ambulation, however his symptoms do not currently limit his lifestyle.  He is currently walking 5-8000 steps per day without limitation.  No lower extremity wounds or ulcerations.  No other specific complaints today.    Past Cardiac Testin. Last Coronary Angio: 2021              1.  Severe stenosis of the mid LCx treated with overlapping JAXSON (3.0 x 38 mm Xience JAXSON, 3.5 x 23 mm JAXSON)               2.  Chronic total occlusion of the proximal RCA  2. Prior Stress Testing: None  3. Last Echo:  Patient tolerated procedure WNL. No bleed or hematoma to site. Sterile dressing intact. 1/22              1.  LVEF 40-45%              2.  Grade 1 diastolic dysfunction              3.  Mild aortic stenosis  4. Prior Holter Monitor: None    Review of Systems:   Review of Systems   Constitutional:  Negative for activity change, appetite change, chills, diaphoresis, fatigue, fever, unexpected weight gain and unexpected weight loss.   Eyes:  Negative for blurred vision and double vision.   Respiratory:  Positive for shortness of breath. Negative for cough, chest tightness and wheezing.    Cardiovascular:  Negative for chest pain, palpitations and leg swelling.   Gastrointestinal:  Negative for abdominal pain, anal bleeding, blood in stool and GERD.   Endocrine: Negative for cold intolerance and heat intolerance.   Genitourinary:  Negative for hematuria.   Musculoskeletal:         Mild lower extremity discomfort with ambulation   Neurological:  Negative for dizziness, syncope, weakness and light-headedness.   Hematological:  Does not bruise/bleed easily.   Psychiatric/Behavioral:  Negative for depressed mood and stress. The patient is not nervous/anxious.        I have reviewed and/or updated the patient's past medical, past surgical, family, social history, problem list and allergies as appropriate.     Medications:     Current Outpatient Medications:     albuterol sulfate  (90 Base) MCG/ACT inhaler, Inhale 2 puffs Every 4 (Four) Hours As Needed for Wheezing., Disp: 108 g, Rfl: 3    aspirin (aspirin) 81 MG EC tablet, Take 1 tablet by mouth Daily., Disp: 90 tablet, Rfl: 3    atorvastatin (LIPITOR) 80 MG tablet, Take 1 tablet by mouth Daily., Disp: 90 tablet, Rfl: 1    carvedilol (Coreg) 12.5 MG tablet, Take 1 tablet by mouth 2 (Two) Times a Day With Meals., Disp: 60 tablet, Rfl: 5    cilostazol (PLETAL) 50 MG tablet, Take 1 tablet by mouth 2 (Two) Times a Day., Disp: 180 tablet, Rfl: 3    glimepiride (Amaryl) 2 MG tablet, Take 1 tablet by mouth Daily With Lunch., Disp: 90 tablet, Rfl: 1     "lisinopril (PRINIVIL,ZESTRIL) 10 MG tablet, Take 1 tablet by mouth 2 (Two) Times a Day., Disp: 60 tablet, Rfl: 5    pantoprazole (PROTONIX) 40 MG EC tablet, Take 1 tablet by mouth Daily., Disp: 30 tablet, Rfl: 2    triamcinolone (KENALOG) 0.1 % cream, Apply 1 application  topically to the appropriate area as directed 2 (Two) Times a Day., Disp: 15 g, Rfl: 5    Physical Exam:  Vital Signs:   Vitals:    12/06/23 1310   BP: 122/76   BP Location: Right arm   Patient Position: Sitting   Pulse: 89   Resp: 16   SpO2: 100%   Weight: 71.2 kg (157 lb)   Height: 172.7 cm (68\")       Physical Exam  Constitutional:       General: He is not in acute distress.     Appearance: Normal appearance. He is not diaphoretic.   HENT:      Head: Normocephalic and atraumatic.   Cardiovascular:      Rate and Rhythm: Normal rate and regular rhythm.      Heart sounds: No murmur heard.  Pulmonary:      Effort: Pulmonary effort is normal. No respiratory distress.      Breath sounds: Normal breath sounds. No stridor. No wheezing, rhonchi or rales.   Abdominal:      General: Bowel sounds are normal. There is no distension.      Palpations: Abdomen is soft.      Tenderness: There is no abdominal tenderness. There is no guarding or rebound.   Musculoskeletal:         General: No swelling. Normal range of motion.      Cervical back: Neck supple. No tenderness.   Skin:     General: Skin is warm and dry.   Neurological:      General: No focal deficit present.      Mental Status: He is alert and oriented to person, place, and time.   Psychiatric:         Mood and Affect: Mood normal.         Behavior: Behavior normal.         Results Review:   I reviewed the patient's new clinical results.        Assessment / Plan:     1.  Severe two-vessel coronary artery disease  --Presented with an NSTEMI in 9/21, found to have  of the proximal RCA and underwent PCI x2 to the mid LCx  -- No anginal symptoms  -- Continue aspirin monotherapy  --Continue high " intensity statin  --Follow-up in 1 year, sooner if required     2.  Ischemic cardiomyopathy  --LVEF 40-45% on last assessment in 1/22  -- Euvolemic on exam today, NYHA class I  --Continue carvedilol and lisinopril  -- Previously unable to tolerate Entresto due to hypotension     3.  Hypertension  -- BP adequately controlled today     4.  Hyperlipidemia  --Lipid profile in 9/21 showed LDL 79, HDL 47, triglycerides 86  --Continue high intensity statin  --Repeat lipid profile pending, per PCP     5.  Peripheral arterial disease  -- Short segment of proximal/ostial left common iliac occlusion on CTA, 70% stenosis right iliac stenosis  -- Claudication symptoms remain minimal  -- Continue Pletal  -- Continue aspirin and high intensity statin  -- Advised regular walking exercise program  --If worsening of claudication symptoms, will then consider intervention     6.  Chronic tobacco use  -- Complete cessation advised    Follow Up:   Return in about 1 year (around 12/6/2024).      Thank you for allowing me to participate in the care of your patient. Please to not hesitate to contact me with additional questions or concerns.     FRANCES Mckinley MD  Interventional Cardiology   12/06/2023  13:26 EST

## 2023-12-13 ENCOUNTER — LAB (OUTPATIENT)
Dept: LAB | Facility: HOSPITAL | Age: 63
End: 2023-12-13
Payer: MEDICARE

## 2023-12-13 LAB
ALBUMIN SERPL-MCNC: 3.9 G/DL (ref 3.5–5.2)
ALBUMIN UR-MCNC: <1.2 MG/DL
ALBUMIN/GLOB SERPL: 1.2 G/DL
ALP SERPL-CCNC: 76 U/L (ref 39–117)
ALT SERPL W P-5'-P-CCNC: 18 U/L (ref 1–41)
ANION GAP SERPL CALCULATED.3IONS-SCNC: 8.3 MMOL/L (ref 5–15)
AST SERPL-CCNC: 17 U/L (ref 1–40)
BASOPHILS # BLD AUTO: 0.06 10*3/MM3 (ref 0–0.2)
BASOPHILS NFR BLD AUTO: 0.8 % (ref 0–1.5)
BILIRUB SERPL-MCNC: 0.4 MG/DL (ref 0–1.2)
BUN SERPL-MCNC: 15 MG/DL (ref 8–23)
BUN/CREAT SERPL: 12 (ref 7–25)
CALCIUM SPEC-SCNC: 9.4 MG/DL (ref 8.6–10.5)
CHLORIDE SERPL-SCNC: 101 MMOL/L (ref 98–107)
CHOLEST SERPL-MCNC: 130 MG/DL (ref 0–200)
CO2 SERPL-SCNC: 27.7 MMOL/L (ref 22–29)
CREAT SERPL-MCNC: 1.25 MG/DL (ref 0.76–1.27)
CREAT UR-MCNC: 92.6 MG/DL
DEPRECATED RDW RBC AUTO: 41.4 FL (ref 37–54)
EGFRCR SERPLBLD CKD-EPI 2021: 64.7 ML/MIN/1.73
EOSINOPHIL # BLD AUTO: 0.23 10*3/MM3 (ref 0–0.4)
EOSINOPHIL NFR BLD AUTO: 3 % (ref 0.3–6.2)
ERYTHROCYTE [DISTWIDTH] IN BLOOD BY AUTOMATED COUNT: 12.9 % (ref 12.3–15.4)
GLOBULIN UR ELPH-MCNC: 3.2 GM/DL
GLUCOSE SERPL-MCNC: 125 MG/DL (ref 65–99)
HBA1C MFR BLD: 6 % (ref 4.8–5.6)
HCT VFR BLD AUTO: 36.5 % (ref 37.5–51)
HDLC SERPL-MCNC: 44 MG/DL (ref 40–60)
HGB BLD-MCNC: 12.4 G/DL (ref 13–17.7)
IMM GRANULOCYTES # BLD AUTO: 0.01 10*3/MM3 (ref 0–0.05)
IMM GRANULOCYTES NFR BLD AUTO: 0.1 % (ref 0–0.5)
LDLC SERPL CALC-MCNC: 71 MG/DL (ref 0–100)
LDLC/HDLC SERPL: 1.61 {RATIO}
LYMPHOCYTES # BLD AUTO: 2.56 10*3/MM3 (ref 0.7–3.1)
LYMPHOCYTES NFR BLD AUTO: 33.1 % (ref 19.6–45.3)
MCH RBC QN AUTO: 29.9 PG (ref 26.6–33)
MCHC RBC AUTO-ENTMCNC: 34 G/DL (ref 31.5–35.7)
MCV RBC AUTO: 88 FL (ref 79–97)
MICROALBUMIN/CREAT UR: NORMAL MG/G{CREAT}
MONOCYTES # BLD AUTO: 0.66 10*3/MM3 (ref 0.1–0.9)
MONOCYTES NFR BLD AUTO: 8.5 % (ref 5–12)
NEUTROPHILS NFR BLD AUTO: 4.22 10*3/MM3 (ref 1.7–7)
NEUTROPHILS NFR BLD AUTO: 54.5 % (ref 42.7–76)
NRBC BLD AUTO-RTO: 0 /100 WBC (ref 0–0.2)
PLATELET # BLD AUTO: 331 10*3/MM3 (ref 140–450)
PMV BLD AUTO: 8.9 FL (ref 6–12)
POTASSIUM SERPL-SCNC: 4.8 MMOL/L (ref 3.5–5.2)
PROT SERPL-MCNC: 7.1 G/DL (ref 6–8.5)
RBC # BLD AUTO: 4.15 10*6/MM3 (ref 4.14–5.8)
SODIUM SERPL-SCNC: 137 MMOL/L (ref 136–145)
TRIGL SERPL-MCNC: 76 MG/DL (ref 0–150)
VLDLC SERPL-MCNC: 15 MG/DL (ref 5–40)
WBC NRBC COR # BLD AUTO: 7.74 10*3/MM3 (ref 3.4–10.8)

## 2023-12-13 PROCEDURE — 82043 UR ALBUMIN QUANTITATIVE: CPT | Performed by: INTERNAL MEDICINE

## 2023-12-13 PROCEDURE — 80053 COMPREHEN METABOLIC PANEL: CPT | Performed by: INTERNAL MEDICINE

## 2023-12-13 PROCEDURE — 83036 HEMOGLOBIN GLYCOSYLATED A1C: CPT | Performed by: INTERNAL MEDICINE

## 2023-12-13 PROCEDURE — 80061 LIPID PANEL: CPT | Performed by: INTERNAL MEDICINE

## 2023-12-13 PROCEDURE — 85025 COMPLETE CBC W/AUTO DIFF WBC: CPT | Performed by: INTERNAL MEDICINE

## 2023-12-13 PROCEDURE — 82570 ASSAY OF URINE CREATININE: CPT | Performed by: INTERNAL MEDICINE

## 2024-01-04 RX ORDER — CARVEDILOL 6.25 MG/1
TABLET ORAL
Qty: 180 TABLET | Refills: 3 | OUTPATIENT
Start: 2024-01-04

## 2024-01-05 ENCOUNTER — OFFICE VISIT (OUTPATIENT)
Dept: INTERNAL MEDICINE | Facility: CLINIC | Age: 64
End: 2024-01-05
Payer: MEDICARE

## 2024-01-05 VITALS
HEART RATE: 97 BPM | SYSTOLIC BLOOD PRESSURE: 113 MMHG | WEIGHT: 154.12 LBS | TEMPERATURE: 98 F | BODY MASS INDEX: 23.36 KG/M2 | HEIGHT: 68 IN | OXYGEN SATURATION: 100 % | DIASTOLIC BLOOD PRESSURE: 77 MMHG

## 2024-01-05 DIAGNOSIS — I25.10 CORONARY ARTERY DISEASE INVOLVING NATIVE CORONARY ARTERY OF NATIVE HEART WITHOUT ANGINA PECTORIS: Primary | ICD-10-CM

## 2024-01-05 DIAGNOSIS — M79.2 NEURALGIA: ICD-10-CM

## 2024-01-05 DIAGNOSIS — I10 BENIGN ESSENTIAL HYPERTENSION: ICD-10-CM

## 2024-01-05 DIAGNOSIS — E78.2 MIXED HYPERLIPIDEMIA: ICD-10-CM

## 2024-01-05 DIAGNOSIS — E11.9 DIABETES MELLITUS WITHOUT COMPLICATION: ICD-10-CM

## 2024-01-05 DIAGNOSIS — D64.9 ANEMIA, UNSPECIFIED TYPE: ICD-10-CM

## 2024-01-05 RX ORDER — GLIMEPIRIDE 2 MG/1
2 TABLET ORAL
Qty: 90 TABLET | Refills: 1 | Status: SHIPPED | OUTPATIENT
Start: 2024-01-05

## 2024-01-05 RX ORDER — CARVEDILOL 12.5 MG/1
12.5 TABLET ORAL 2 TIMES DAILY WITH MEALS
Qty: 60 TABLET | Refills: 5 | Status: SHIPPED | OUTPATIENT
Start: 2024-01-05

## 2024-01-05 RX ORDER — LISINOPRIL 10 MG/1
10 TABLET ORAL 2 TIMES DAILY
Qty: 60 TABLET | Refills: 5 | Status: SHIPPED | OUTPATIENT
Start: 2024-01-05

## 2024-01-05 RX ORDER — ATORVASTATIN CALCIUM 80 MG/1
80 TABLET, FILM COATED ORAL DAILY
Qty: 90 TABLET | Refills: 1 | Status: SHIPPED | OUTPATIENT
Start: 2024-01-05

## 2024-01-05 NOTE — PROGRESS NOTES
"  Office Visit      Patient Name: Tristan Castle  : 1960   MRN: [id number]   Care Team: Patient Care Team:  Marybel Fleming MD as PCP - General (Internal Medicine)    Chief Complaint  Follow-up (Review Labs)    Subjective     Subjective      Tristan Castle presents to [facility] for lab discussion.  Suffers from hyperlipidemia.  He is compliant with his atorvastatin and taking as prescribed.  Denies myalgia or dark-colored urine.  Lab Results   Component Value Date    CHOL 130 2023    CHLPL 206 (H) 2014    TRIG 76 2023    HDL 44 2023    LDL 71 2023     Has known PAD, trying to avoid surgery.  Worse in the left lower extremity.  He is compliant with his pletal and aspirin.  He tries to stay physically active with walking.  He does continue to smoke, he tells me he currently smokes 5 cigarettes/day.  He follows up with cardiology.  He has noticed more pain in the lower extremities, wondering if gabapentin may be beneficial, he has been on this before.  He has not talked with his PCP regarding this.  Lab Results   Component Value Date    WBC 7.74 2023    HGB 12.4 (L) 2023    HCT 36.5 (L) 2023    MCV 88.0 2023     2023     Mild anemia on labs, no recent iron studies or B12 studies.  He denies any known bleeding including hematuria and hematochezia.  He had recent colonoscopy which showed a polyp, supposed to repeat in 1 year due to poor prep.  He did have positive Cologuard.  He has follow-up with GI.   Lab Results   Component Value Date    HGBA1C 6.00 (H) 2023     He is compliant with glimepiride.  Tries to follow a diabetic diet.  Denies polyuria, polydipsia, polyphagia, vision changes, and foot ulcer/callus.    He has no acute complaints today.     Objective     Objective   Vital Signs:   /77   Pulse 97   Temp 98 °F (36.7 °C) (Tympanic)   Ht 172.7 cm (68\")   Wt 69.9 kg (154 lb 1.9 oz)   SpO2 100%   BMI " 23.43 kg/m²    Physical Exam  Vitals and nursing note reviewed.   Constitutional:       General: He is not in acute distress.     Appearance: Normal appearance. He is not toxic-appearing.   Eyes:      Pupils: Pupils are equal, round, and reactive to light.   Cardiovascular:      Rate and Rhythm: Normal rate and regular rhythm.      Heart sounds: Normal heart sounds. No murmur heard.     Comments: Cool left lower extremity, baseline      Pulmonary:      Effort: Pulmonary effort is normal. No respiratory distress.      Breath sounds: Normal breath sounds. No wheezing.   Abdominal:      General: Bowel sounds are normal. There is no distension.      Palpations: Abdomen is soft.      Tenderness: There is no abdominal tenderness.   Skin:     General: Skin is warm and dry.      Findings: No rash.   Neurological:      General: No focal deficit present.      Mental Status: He is alert and oriented to person, place, and time.   Psychiatric:         Mood and Affect: Mood normal.         Behavior: Behavior normal.          Assessment / Plan      Assessment & Plan   Problem List Items Addressed This Visit       Hyperlipidemia    Relevant Medications    atorvastatin (LIPITOR) 80 MG tablet    Stable, continue atorvastatin and low-cholesterol diet.     Other Visit Diagnoses       Coronary artery disease involving native coronary artery of native heart without angina pectoris    -  Primary    Relevant Medications    carvedilol (Coreg) 12.5 MG tablet    Management cardiology.  Continue aspirin, statin, beta-blocker, and continued follow-up.    Neuralgia        Relevant Orders    Iron and TIBC    Ferritin    Vitamin B12    Due to anemia will update above labs to rule out other causes.  I suspect this is from his PAD.  He will discuss further gabapentin with PCP follow-up with cardiology.  Continue Pletal    Anemia, unspecified type        Relevant Orders    Iron and TIBC    Ferritin    Vitamin B12    Monitor for now, keep scheduled  follow-up with GI and update above labs.    Diabetes mellitus without complication        Relevant Medications    glimepiride (Amaryl) 2 MG tablet    - Follow diabetic diet by decreasing carbohydrates, sugar, and processed foods.   - Exercise encouraged as tolerated-- discussed low impact walking 30 minutes/day 5 days/week.   - Continue taking all medication as prescribed.   - Annual eye exam encouraged  - Check feet regularly for calluses or ulcers. Wear protective foot wear/no bare feet  - Risk discussion regarding uncontrolled diabetes.   -Labs are UTD      Benign essential hypertension        Relevant Medications    carvedilol (Coreg) 12.5 MG tablet    lisinopril (PRINIVIL,ZESTRIL) 10 MG tablet    Stable. Continue current medications as prescribed. Recommend DASH diet, moderate-intensity exercises 4-5 times/week, medication compliance, and home blood pressure monitoring.             BMI is within normal parameters. No other follow-up for BMI required.    Follow Up   Return in about 6 months (around 7/5/2024) for Next scheduled follow up.  Patient was given instructions and counseling regarding his condition or for health maintenance advice. Please see specific information pulled into the AVS if appropriate.     JOSE Bradshaw  [facility] - [place]

## 2024-01-10 ENCOUNTER — LAB (OUTPATIENT)
Dept: LAB | Facility: HOSPITAL | Age: 64
End: 2024-01-10
Payer: MEDICARE

## 2024-01-10 LAB
ALBUMIN SERPL-MCNC: 4.3 G/DL (ref 3.5–5.2)
ALBUMIN UR-MCNC: 2.6 MG/DL
ALBUMIN/GLOB SERPL: 1.5 G/DL
ALP SERPL-CCNC: 81 U/L (ref 39–117)
ALT SERPL W P-5'-P-CCNC: 18 U/L (ref 1–41)
ANION GAP SERPL CALCULATED.3IONS-SCNC: 8 MMOL/L (ref 5–15)
AST SERPL-CCNC: 21 U/L (ref 1–40)
BASOPHILS # BLD AUTO: 0.06 10*3/MM3 (ref 0–0.2)
BASOPHILS NFR BLD AUTO: 0.9 % (ref 0–1.5)
BILIRUB SERPL-MCNC: 0.4 MG/DL (ref 0–1.2)
BUN SERPL-MCNC: 19 MG/DL (ref 8–23)
BUN/CREAT SERPL: 15.7 (ref 7–25)
CALCIUM SPEC-SCNC: 9.6 MG/DL (ref 8.6–10.5)
CHLORIDE SERPL-SCNC: 99 MMOL/L (ref 98–107)
CHOLEST SERPL-MCNC: 146 MG/DL (ref 0–200)
CO2 SERPL-SCNC: 29 MMOL/L (ref 22–29)
CREAT SERPL-MCNC: 1.21 MG/DL (ref 0.76–1.27)
CREAT UR-MCNC: 179.5 MG/DL
DEPRECATED RDW RBC AUTO: 39.4 FL (ref 37–54)
EGFRCR SERPLBLD CKD-EPI 2021: 67.3 ML/MIN/1.73
EOSINOPHIL # BLD AUTO: 0.23 10*3/MM3 (ref 0–0.4)
EOSINOPHIL NFR BLD AUTO: 3.4 % (ref 0.3–6.2)
ERYTHROCYTE [DISTWIDTH] IN BLOOD BY AUTOMATED COUNT: 12.6 % (ref 12.3–15.4)
FERRITIN SERPL-MCNC: 82.2 NG/ML (ref 30–400)
GLOBULIN UR ELPH-MCNC: 2.9 GM/DL
GLUCOSE SERPL-MCNC: 99 MG/DL (ref 65–99)
HBA1C MFR BLD: 6.1 % (ref 4.8–5.6)
HCT VFR BLD AUTO: 39 % (ref 37.5–51)
HDLC SERPL-MCNC: 48 MG/DL (ref 40–60)
HGB BLD-MCNC: 13 G/DL (ref 13–17.7)
IMM GRANULOCYTES # BLD AUTO: 0.02 10*3/MM3 (ref 0–0.05)
IMM GRANULOCYTES NFR BLD AUTO: 0.3 % (ref 0–0.5)
IRON 24H UR-MRATE: 187 MCG/DL (ref 59–158)
IRON SATN MFR SERPL: 45 % (ref 20–50)
LDLC SERPL CALC-MCNC: 78 MG/DL (ref 0–100)
LDLC/HDLC SERPL: 1.58 {RATIO}
LYMPHOCYTES # BLD AUTO: 2.77 10*3/MM3 (ref 0.7–3.1)
LYMPHOCYTES NFR BLD AUTO: 40.6 % (ref 19.6–45.3)
MCH RBC QN AUTO: 28.8 PG (ref 26.6–33)
MCHC RBC AUTO-ENTMCNC: 33.3 G/DL (ref 31.5–35.7)
MCV RBC AUTO: 86.5 FL (ref 79–97)
MICROALBUMIN/CREAT UR: 14.5 MG/G (ref 0–29)
MONOCYTES # BLD AUTO: 0.77 10*3/MM3 (ref 0.1–0.9)
MONOCYTES NFR BLD AUTO: 11.3 % (ref 5–12)
NEUTROPHILS NFR BLD AUTO: 2.97 10*3/MM3 (ref 1.7–7)
NEUTROPHILS NFR BLD AUTO: 43.5 % (ref 42.7–76)
NRBC BLD AUTO-RTO: 0 /100 WBC (ref 0–0.2)
PLATELET # BLD AUTO: 361 10*3/MM3 (ref 140–450)
PMV BLD AUTO: 8.9 FL (ref 6–12)
POTASSIUM SERPL-SCNC: 4.2 MMOL/L (ref 3.5–5.2)
PROT SERPL-MCNC: 7.2 G/DL (ref 6–8.5)
RBC # BLD AUTO: 4.51 10*6/MM3 (ref 4.14–5.8)
SODIUM SERPL-SCNC: 136 MMOL/L (ref 136–145)
TIBC SERPL-MCNC: 414 MCG/DL (ref 298–536)
TRANSFERRIN SERPL-MCNC: 278 MG/DL (ref 200–360)
TRIGL SERPL-MCNC: 110 MG/DL (ref 0–150)
VIT B12 BLD-MCNC: 917 PG/ML (ref 211–946)
VLDLC SERPL-MCNC: 20 MG/DL (ref 5–40)
WBC NRBC COR # BLD AUTO: 6.82 10*3/MM3 (ref 3.4–10.8)

## 2024-01-10 PROCEDURE — 83036 HEMOGLOBIN GLYCOSYLATED A1C: CPT | Performed by: INTERNAL MEDICINE

## 2024-01-10 PROCEDURE — 82570 ASSAY OF URINE CREATININE: CPT | Performed by: INTERNAL MEDICINE

## 2024-01-10 PROCEDURE — 83540 ASSAY OF IRON: CPT | Performed by: INTERNAL MEDICINE

## 2024-01-10 PROCEDURE — 85025 COMPLETE CBC W/AUTO DIFF WBC: CPT | Performed by: INTERNAL MEDICINE

## 2024-01-10 PROCEDURE — 84466 ASSAY OF TRANSFERRIN: CPT | Performed by: INTERNAL MEDICINE

## 2024-01-10 PROCEDURE — 80053 COMPREHEN METABOLIC PANEL: CPT | Performed by: INTERNAL MEDICINE

## 2024-01-10 PROCEDURE — 82043 UR ALBUMIN QUANTITATIVE: CPT | Performed by: INTERNAL MEDICINE

## 2024-01-10 PROCEDURE — 80061 LIPID PANEL: CPT | Performed by: INTERNAL MEDICINE

## 2024-01-10 PROCEDURE — 82607 VITAMIN B-12: CPT | Performed by: NURSE PRACTITIONER

## 2024-01-10 PROCEDURE — 82728 ASSAY OF FERRITIN: CPT | Performed by: INTERNAL MEDICINE

## 2024-01-17 ENCOUNTER — OFFICE VISIT (OUTPATIENT)
Dept: GASTROENTEROLOGY | Facility: CLINIC | Age: 64
End: 2024-01-17
Payer: MEDICARE

## 2024-01-17 VITALS
SYSTOLIC BLOOD PRESSURE: 122 MMHG | WEIGHT: 159 LBS | BODY MASS INDEX: 24.18 KG/M2 | DIASTOLIC BLOOD PRESSURE: 74 MMHG | HEART RATE: 90 BPM

## 2024-01-17 DIAGNOSIS — K57.30 DIVERTICULOSIS OF COLON: ICD-10-CM

## 2024-01-17 DIAGNOSIS — R19.8 TENESMUS: ICD-10-CM

## 2024-01-17 DIAGNOSIS — D12.6 TUBULAR ADENOMA OF COLON: ICD-10-CM

## 2024-01-17 DIAGNOSIS — T18.5XXA FOREIGN BODY OF RECTUM, INITIAL ENCOUNTER: ICD-10-CM

## 2024-01-17 DIAGNOSIS — K62.5 RECTAL BLEEDING: ICD-10-CM

## 2024-01-17 DIAGNOSIS — K25.9 GASTRIC ULCER, UNSPECIFIED CHRONICITY, UNSPECIFIED WHETHER GASTRIC ULCER HEMORRHAGE OR PERFORATION PRESENT: ICD-10-CM

## 2024-01-17 DIAGNOSIS — K62.89 ANAL OR RECTAL PAIN: Primary | ICD-10-CM

## 2024-01-17 RX ORDER — HYDROCORTISONE 25 MG/G
CREAM TOPICAL 2 TIMES DAILY
Qty: 30 G | Refills: 1 | Status: SHIPPED | OUTPATIENT
Start: 2024-01-17
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
## 2024-01-17 NOTE — PATIENT INSTRUCTIONS
Fiber Foods  It is recommended that you consume 25-45 grams daily.    Fresh & Dried Fruit  Serving Size Fiber (g)    Apples with skin  1 medium 5.0    Apricot  3 medium 1.0    Apricots, dried  4 pieces 2.9    Banana  1 medium 3.9    Blueberries  1 cup 4.2    Cantaloupe, cubes  1 cup 1.3    Figs, dried  2 medium 3.7    Grapefruit  1/2 medium 3.1    Orange, navel  1 medium 3.4    Peach  1 medium 2.0    Peaches, dried  3 pieces 3.2    Pear  1 medium 5.1    Plum  1 medium 1.1    Raisins  1.5 oz box 1.6    Raspberries  1 cup 6.4    Strawberries  1 cup 4.4      Grains, Beans, Nuts & Seeds  Serving Size Fiber (g)    Almonds  1 oz 4.2    Black beans, cooked  1 cup 13.9    Bran cereal  1 cup 19.9    Bread, whole wheat  1 slice 2.0    Brown rice, dry  1 cup 7.9    Cashews  1 oz 1.0    Flax seeds  3 Tbsp. 6.9    Garbanzo beans, cooked  1 cup 5.8    Kidney beans, cooked  1 cup 11.6    Lentils, red cooked  1 cup 13.6    Lima beans, cooked  1 cup 8.6    Oats, rolled dry  1 cup 12.0    Quinoa (seeds) dry  1/4 cup 6.2    Quinoa, cooked  1 cup 8.4    Pasta, whole wheat  1 cup 6.3    Peanuts  1 oz 2.3    Pistachio nuts  1 oz 3.1    Pumpkin seeds  1/4 cup 4.1    Soybeans, cooked  1 cup 8.6    Sunflower seeds  1/4 cup 3.0    Walnuts  1 oz 3.1            Vegetables  Serving Size Fiber (g)    Avocado (fruit)  1 medium 11.8    Beets, cooked  1 cup 2.8    Beet greens  1 cup 4.2    Bok fang, cooked  1 cup 2.8    Broccoli, cooked  1 cup 4.5    Tavares sprouts, cooked  1 cup 3.6    Cabbage, cooked  1 cup 4.2    Carrot  1 medium 2.6    Carrot, cooked  1 cup 5.2    Cauliflower, cooked  1 cup 3.4    Moreno slaw  1 cup 4.0    Poncho greens, cooked  1 cup 2.6    Corn, sweet  1 cup 4.6    Green beans  1 cup 4.0    Celery  1 stalk 1.1    Kale, cooked  1 cup 7.2    Onions, raw  1 cup 2.9    Peas, cooked  1 cup 8.8    Peppers, sweet  1 cup 2.6    Pop corn, air-popped  3 cups 3.6    Potato, baked w/ skin  1 medium 4.8    Spinach, cooked  1 cup 4.3     Summer squash, cooked  1 cup 2.5    Sweet potato, cooked  1 medium 4.9    Swiss chard, cooked  1 cup 3.7    Tomato  1 medium 1.0    Winter squash, cooked  1 cup 6.2    Zucchini, cooked  1 cup 2.6

## 2024-01-18 ENCOUNTER — TELEPHONE (OUTPATIENT)
Dept: GASTROENTEROLOGY | Facility: CLINIC | Age: 64
End: 2024-01-18
Payer: MEDICARE

## 2024-01-18 ENCOUNTER — HOSPITAL ENCOUNTER (OUTPATIENT)
Dept: CT IMAGING | Facility: HOSPITAL | Age: 64
Discharge: HOME | End: 2024-01-18
Admitting: PHYSICIAN ASSISTANT
Payer: MEDICARE

## 2024-01-18 DIAGNOSIS — T18.5XXA FOREIGN BODY OF RECTUM, INITIAL ENCOUNTER: ICD-10-CM

## 2024-01-18 DIAGNOSIS — R93.3 ABNORMAL CT SCAN, COLON: Primary | ICD-10-CM

## 2024-01-18 DIAGNOSIS — K62.89 ANAL OR RECTAL PAIN: ICD-10-CM

## 2024-01-18 DIAGNOSIS — R19.8 TENESMUS: ICD-10-CM

## 2024-01-18 DIAGNOSIS — K62.5 RECTAL BLEEDING: ICD-10-CM

## 2024-01-18 PROCEDURE — 74177 CT ABD & PELVIS W/CONTRAST: CPT

## 2024-01-18 PROCEDURE — 25510000001 IOPAMIDOL 61 % SOLUTION: Performed by: PHYSICIAN ASSISTANT

## 2024-01-18 RX ORDER — SODIUM CHLORIDE 9 MG/ML
30 INJECTION, SOLUTION INTRAVENOUS CONTINUOUS PRN
OUTPATIENT
Start: 2024-01-18

## 2024-01-18 RX ORDER — BISACODYL 5 MG/1
TABLET, DELAYED RELEASE ORAL
Qty: 4 TABLET | Refills: 0 | Status: SHIPPED | OUTPATIENT
Start: 2024-01-18 | End: 2024-01-22 | Stop reason: SDUPTHER

## 2024-01-18 RX ADMIN — IOPAMIDOL 100 ML: 612 INJECTION, SOLUTION INTRAVENOUS at 10:46

## 2024-01-18 NOTE — TELEPHONE ENCOUNTER
Called pt and spoke with him regarding abnormal CT scan result with inflammation in the rectosigmoid colon. He needs urgent colonoscopy. We can schedule him for Monday.     He took plenvue last procedure but had inadequate prep. I have sent golytely and dulcolax for him. Please call him back to arrange procedure and give instruction.

## 2024-01-19 NOTE — PRE-PROCEDURE INSTRUCTIONS
PAT phone history completed with patient for upcoming procedure on 1/22/24.    PAT PASS reviewed with patient and he/she verbalized understanding of the following:     Do not eat or drink anything after midnight the night before procedure unless otherwise instructed by physician/surgeon's office, this includes no gum, candy, mints, tobacco products or e-cigarettes.  Do not shave the area to be operated on at least 48 hours prior to procedure.  Do not wear makeup, lotion, hair products, or nail polish.  Do not wear any jewelry and remove all piercings.  Do not wear any adhesive if you wear dentures.  Do not wear contacts; bring in glasses if needed.  Only take medications on the morning of procedure as instructed by PAT nurse per anesthesia guidelines or as instructed by physician's office.   If you are on any blood thinners reach out to the physician/surgeon's office for instructions on when/if they will need to be stopped prior to procedure.   Bring in picture ID and insurance card, advanced directive copies if applicable, CPAP/BIPAP/Inhalers if indicated morning of procedure, leave any other valuables at home.  Ensure you have arranged for someone to drive you home the day of your procedure and someone to care for you at home afterwards. It is recommended that you do not drive, drink alcohol, or make any major legal decisions for at least 24 hours after your procedure is complete.    Instructions given on hospital entrance and registration location.

## 2024-01-22 ENCOUNTER — TELEPHONE (OUTPATIENT)
Dept: GASTROENTEROLOGY | Facility: CLINIC | Age: 64
End: 2024-01-22
Payer: MEDICARE

## 2024-01-22 DIAGNOSIS — R19.8 TENESMUS: ICD-10-CM

## 2024-01-22 DIAGNOSIS — K62.89 ANAL OR RECTAL PAIN: ICD-10-CM

## 2024-01-22 DIAGNOSIS — T18.5XXA FOREIGN BODY OF RECTUM, INITIAL ENCOUNTER: ICD-10-CM

## 2024-01-22 DIAGNOSIS — K62.5 RECTAL BLEEDING: ICD-10-CM

## 2024-01-22 DIAGNOSIS — R93.3 ABNORMAL CT SCAN, COLON: ICD-10-CM

## 2024-01-22 DIAGNOSIS — I73.9 PAD (PERIPHERAL ARTERY DISEASE): ICD-10-CM

## 2024-01-22 DIAGNOSIS — R11.2 NAUSEA AND VOMITING, UNSPECIFIED VOMITING TYPE: Primary | ICD-10-CM

## 2024-01-22 RX ORDER — ONDANSETRON 4 MG/1
4 TABLET, ORALLY DISINTEGRATING ORAL EVERY 8 HOURS PRN
Qty: 12 TABLET | Refills: 0 | Status: SHIPPED | OUTPATIENT
Start: 2024-01-22 | End: 2024-01-22

## 2024-01-22 RX ORDER — PROMETHAZINE HYDROCHLORIDE 12.5 MG/1
12.5 TABLET ORAL EVERY 6 HOURS PRN
Qty: 6 TABLET | Refills: 0 | Status: SHIPPED | OUTPATIENT
Start: 2024-01-22

## 2024-01-22 RX ORDER — METOCLOPRAMIDE 10 MG/1
10 TABLET ORAL TAKE AS DIRECTED
Qty: 1 TABLET | Refills: 0 | Status: SHIPPED | OUTPATIENT
Start: 2024-01-22

## 2024-01-22 RX ORDER — BISACODYL 5 MG/1
TABLET, DELAYED RELEASE ORAL
Qty: 4 TABLET | Refills: 0 | Status: SHIPPED | OUTPATIENT
Start: 2024-01-22 | End: 2024-01-26 | Stop reason: HOSPADM

## 2024-01-22 RX ORDER — CILOSTAZOL 50 MG/1
50 TABLET ORAL 2 TIMES DAILY
Qty: 180 TABLET | Refills: 1 | Status: SHIPPED | OUTPATIENT
Start: 2024-01-22

## 2024-01-22 RX ORDER — ALBUTEROL SULFATE 90 UG/1
2 AEROSOL, METERED RESPIRATORY (INHALATION) EVERY 4 HOURS PRN
Qty: 18 G | Refills: 2 | Status: SHIPPED | OUTPATIENT
Start: 2024-01-22

## 2024-01-22 NOTE — TELEPHONE ENCOUNTER
"Pt advised that he was unable to \"keep down\" his prep and wished to reschedule with an alternative prep.  "

## 2024-01-22 NOTE — TELEPHONE ENCOUNTER
He will need to take golytely and dulcolax due to previous poor prep. This time, he should take reglan before drinking prep to help with nausea and vomiting. He can also take phenergan sparingly if he becomes sick.     Please reschedule him soon because he was planned to have urgent procedure.

## 2024-01-22 NOTE — TELEPHONE ENCOUNTER
Last filled in 12/2022, while you were out apparently.  He had filled for a year.  Are you willing to fill??

## 2024-01-26 ENCOUNTER — HOSPITAL ENCOUNTER (OUTPATIENT)
Facility: HOSPITAL | Age: 64
Setting detail: HOSPITAL OUTPATIENT SURGERY
Discharge: HOME | End: 2024-01-26
Attending: INTERNAL MEDICINE | Admitting: INTERNAL MEDICINE
Payer: MEDICARE

## 2024-01-26 ENCOUNTER — ANESTHESIA EVENT (OUTPATIENT)
Dept: GASTROENTEROLOGY | Facility: HOSPITAL | Age: 64
End: 2024-01-26
Payer: MEDICARE

## 2024-01-26 ENCOUNTER — ANESTHESIA (OUTPATIENT)
Dept: GASTROENTEROLOGY | Facility: HOSPITAL | Age: 64
End: 2024-01-26
Payer: MEDICARE

## 2024-01-26 VITALS
DIASTOLIC BLOOD PRESSURE: 73 MMHG | SYSTOLIC BLOOD PRESSURE: 148 MMHG | OXYGEN SATURATION: 97 % | RESPIRATION RATE: 16 BRPM | TEMPERATURE: 98.1 F | HEART RATE: 78 BPM

## 2024-01-26 DIAGNOSIS — R93.3 ABNORMAL CT SCAN, COLON: ICD-10-CM

## 2024-01-26 DIAGNOSIS — K62.5 RECTAL BLEEDING: ICD-10-CM

## 2024-01-26 DIAGNOSIS — R19.8 TENESMUS: ICD-10-CM

## 2024-01-26 DIAGNOSIS — K62.89 ANAL OR RECTAL PAIN: ICD-10-CM

## 2024-01-26 DIAGNOSIS — T18.5XXA FOREIGN BODY OF RECTUM, INITIAL ENCOUNTER: ICD-10-CM

## 2024-01-26 LAB — GLUCOSE BLDC GLUCOMTR-MCNC: 118 MG/DL (ref 70–130)

## 2024-01-26 PROCEDURE — 45380 COLONOSCOPY AND BIOPSY: CPT | Performed by: INTERNAL MEDICINE

## 2024-01-26 PROCEDURE — 25010000002 FENTANYL CITRATE (PF) 50 MCG/ML SOLUTION PREFILLED SYRINGE: Performed by: NURSE ANESTHETIST, CERTIFIED REGISTERED

## 2024-01-26 PROCEDURE — 25010000002 PROPOFOL 10 MG/ML EMULSION: Performed by: NURSE ANESTHETIST, CERTIFIED REGISTERED

## 2024-01-26 PROCEDURE — 82948 REAGENT STRIP/BLOOD GLUCOSE: CPT | Performed by: PHYSICIAN ASSISTANT

## 2024-01-26 PROCEDURE — 25810000003 SODIUM CHLORIDE 0.9 % SOLUTION: Performed by: PHYSICIAN ASSISTANT

## 2024-01-26 PROCEDURE — 45385 COLONOSCOPY W/LESION REMOVAL: CPT | Performed by: INTERNAL MEDICINE

## 2024-01-26 RX ORDER — SODIUM CHLORIDE 9 MG/ML
30 INJECTION, SOLUTION INTRAVENOUS CONTINUOUS PRN
Status: DISCONTINUED | OUTPATIENT
Start: 2024-01-26 | End: 2024-01-26 | Stop reason: HOSPADM

## 2024-01-26 RX ORDER — PROPOFOL 10 MG/ML
VIAL (ML) INTRAVENOUS AS NEEDED
Status: DISCONTINUED | OUTPATIENT
Start: 2024-01-26 | End: 2024-01-26 | Stop reason: SURG

## 2024-01-26 RX ORDER — POLYETHYLENE GLYCOL 3350 17 G/17G
17 POWDER, FOR SOLUTION ORAL DAILY
Qty: 510 G | Refills: 2 | Status: SHIPPED | OUTPATIENT
Start: 2024-01-26

## 2024-01-26 RX ORDER — SIMETHICONE 40MG/0.6ML
SUSPENSION, DROPS(FINAL DOSAGE FORM)(ML) ORAL AS NEEDED
Status: DISCONTINUED | OUTPATIENT
Start: 2024-01-26 | End: 2024-01-26 | Stop reason: HOSPADM

## 2024-01-26 RX ORDER — FENTANYL CITRATE 50 UG/ML
INJECTION, SOLUTION INTRAMUSCULAR; INTRAVENOUS AS NEEDED
Status: DISCONTINUED | OUTPATIENT
Start: 2024-01-26 | End: 2024-01-26 | Stop reason: SURG

## 2024-01-26 RX ADMIN — FENTANYL CITRATE 50 MCG: 50 INJECTION, SOLUTION INTRAMUSCULAR; INTRAVENOUS at 10:44

## 2024-01-26 RX ADMIN — SODIUM CHLORIDE 30 ML/HR: 9 INJECTION, SOLUTION INTRAVENOUS at 09:28

## 2024-01-26 RX ADMIN — PROPOFOL 100 MG: 10 INJECTION, EMULSION INTRAVENOUS at 10:44

## 2024-01-26 RX ADMIN — PROPOFOL 100 MG: 10 INJECTION, EMULSION INTRAVENOUS at 10:55

## 2024-01-26 RX ADMIN — PROPOFOL 100 MG: 10 INJECTION, EMULSION INTRAVENOUS at 11:08

## 2024-01-26 NOTE — DISCHARGE INSTRUCTIONS
No pushing, pulling, tugging,  heavy lifting, or strenuous activity.  No major decision making, driving, or drinking alcoholic beverages for 24 hours. ( due to the medications you have  received)  Always use good hand hygiene/washing techniques.  NO driving while taking pain medications.    * if you have an incision:  Check your incision area every day for signs of infection.   Check for:  * more redness, swelling, or pain  *more fluid or blood  *warmth  *pus or bad smellTo assist you in voiding:  Drink plenty of fluids  Listen to running water while attempting to void.    If you are unable to urinate and you have an uncomfortable urge to void or it has been   6 hours since you were discharged, return to the Emergency RoomTo assist you in voiding:  Drink plenty of fluids  Listen to running water while attempting to void.    If you are unable to urinate and you have an uncomfortable urge to void or it has been   6 hours since you were discharged, return to the Emergency Room    - Discharge patient to home (ambulatory).   - High fiber diet.   - OK to start back on pletal / ASA after 48 hours  - Metamucil 2 fiber pills daily  - Miralax 17g po daily   - Await pathology results.   - Repeat colonoscopy in 3 - 5 years for surveillance and poor bowel prep.   - Return to GI office in 8 weeks.

## 2024-01-26 NOTE — ANESTHESIA POSTPROCEDURE EVALUATION
Patient: Tristan Castle    Procedure Summary       Date: 01/26/24 Room / Location: [facility] ENDOSCOPY 2 / [facility] ENDOSCOPY    Anesthesia Start: 1042 Anesthesia Stop: 1120    Procedure: COLONOSCOPY WITH BIOPSY with polypectomy (Anus) Diagnosis:       Abnormal CT scan, colon      Anal or rectal pain      Tenesmus      Rectal bleeding      Foreign body of rectum, initial encounter      (Abnormal CT scan, colon [R93.3])      (Anal or rectal pain [K62.89])      (Tenesmus [R19.8])      (Rectal bleeding [K62.5])      (Foreign body of rectum, initial encounter [T18.5XXA])    Surgeons: Aida Ely MD Provider: Urbano Hernandez CRNA    Anesthesia Type: MAC ASA Status: 3            Anesthesia Type: MAC    Vitals  No vitals data found for the desired time range.          Post Anesthesia Care and Evaluation    Patient location during evaluation: bedside  Patient participation: complete - patient participated  Level of consciousness: awake  Pain score: 0  Pain management: adequate    Airway patency: patent  Anesthetic complications: No anesthetic complications  PONV Status: controlled  Cardiovascular status: acceptable and stable  Respiratory status: acceptable and room air  Hydration status: acceptable    Comments: Vital signs as noted in nursing documentation as per protocol
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
## 2024-01-26 NOTE — ANESTHESIA PREPROCEDURE EVALUATION
Anesthesia Evaluation     Patient summary reviewed and Nursing notes reviewed   no history of anesthetic complications:   NPO Solid Status: > 8 hours  NPO Liquid Status: > 4 hours           Airway   Mallampati: II  TM distance: >3 FB  Neck ROM: full  Possible difficult intubation  Dental - normal exam     Pulmonary - normal exam   (+) a smoker Current, Smoked day of surgery, COPD, asthma,shortness of breath, sleep apnea  Cardiovascular - normal exam  Exercise tolerance: good (4-7 METS)    ECG reviewed  PT is on anticoagulation therapy  Patient on routine beta blocker    (+) hypertension, past MI , CAD, cardiac stents more than 12 months ago , CHF , PVD, hyperlipidemia    ROS comment: 1/13/22 echo-    · The left ventricular cavity is mildly dilated.  · Estimated left ventricular EF = 45% Left ventricular ejection fraction appears to be 41 - 45%. Left ventricular systolic function is low normal.  · Left ventricular diastolic function is consistent with (grade I) impaired relaxation.  · Mild aortic valve stenosis is present.  · Estimated right ventricular systolic pressure from tricuspid regurgitation is normal (<35 mmHg). Calculated right ventricular systolic pressure from tricuspid regurgitation is 15 mmHg.         Neuro/Psych  (+) psychiatric history Anxiety and Depression  GI/Hepatic/Renal/Endo    (+) GERD, GI bleeding , diabetes mellitus type 2    Musculoskeletal     Abdominal    Substance History   (+) alcohol use  (-) drug use     OB/GYN negative ob/gyn ROS         Other   arthritis,     ROS/Med Hx Other: RLS                Anesthesia Plan    ASA 3     MAC     (Risks and benefits discussed including risk of aspiration, recall and dental damage. All patient questions answered.    Will continue with plan of care.)  intravenous induction     Anesthetic plan, risks, benefits, and alternatives have been provided, discussed and informed consent has been obtained with: patient.  Pre-procedure education provided  Plan  discussed with CRNA.    CODE STATUS:

## 2024-01-30 LAB — REF LAB TEST METHOD: NORMAL

## 2024-02-12 ENCOUNTER — TELEPHONE (OUTPATIENT)
Dept: GASTROENTEROLOGY | Facility: CLINIC | Age: 64
End: 2024-02-12
Payer: MEDICARE

## 2024-02-12 DIAGNOSIS — K59.04 CHRONIC IDIOPATHIC CONSTIPATION: Primary | ICD-10-CM

## 2024-02-21 ENCOUNTER — PATIENT ROUNDING (BHMG ONLY) (OUTPATIENT)
Dept: PULMONOLOGY | Facility: CLINIC | Age: 64
End: 2024-02-21
Payer: MEDICARE

## 2024-02-21 ENCOUNTER — OFFICE VISIT (OUTPATIENT)
Dept: PULMONOLOGY | Facility: CLINIC | Age: 64
End: 2024-02-21
Payer: MEDICARE

## 2024-02-21 VITALS
HEART RATE: 90 BPM | DIASTOLIC BLOOD PRESSURE: 72 MMHG | WEIGHT: 158.8 LBS | BODY MASS INDEX: 24.07 KG/M2 | OXYGEN SATURATION: 96 % | SYSTOLIC BLOOD PRESSURE: 136 MMHG | RESPIRATION RATE: 18 BRPM | HEIGHT: 68 IN

## 2024-02-21 DIAGNOSIS — F17.210 NICOTINE DEPENDENCE, CIGARETTES, UNCOMPLICATED: ICD-10-CM

## 2024-02-21 DIAGNOSIS — J43.9 PULMONARY EMPHYSEMA, UNSPECIFIED EMPHYSEMA TYPE: ICD-10-CM

## 2024-02-21 DIAGNOSIS — J44.9 CHRONIC OBSTRUCTIVE PULMONARY DISEASE, UNSPECIFIED COPD TYPE: ICD-10-CM

## 2024-02-21 DIAGNOSIS — R06.02 SHORTNESS OF BREATH: Primary | ICD-10-CM

## 2024-02-21 RX ORDER — BUDESONIDE, GLYCOPYRROLATE, AND FORMOTEROL FUMARATE 160; 9; 4.8 UG/1; UG/1; UG/1
2 AEROSOL, METERED RESPIRATORY (INHALATION) 2 TIMES DAILY
Qty: 1 EACH | Refills: 5 | Status: SHIPPED | OUTPATIENT
Start: 2024-02-21

## 2024-02-21 NOTE — PROGRESS NOTES
CONSULT NOTE    Requested by:   Ronnie Mckinley MD D'Costa, Gina, MD      Chief Complaint   Patient presents with    Breathing Problem    Consult       Subjective:  Tristan Castle is a 64 y.o. male.   Patient comes in today for evaluation of shortness of breath. Patient says that for the past few years, he has been noticing that his exercise tolerance has been declining. The patient has had days when walking any significant distance is difficult to do without stopping in the middle, to catch his breath.     Patient also complains of some cough and phlegm production that occurs on most mornings out of the week, for most weeks out of the month, for the past few years. Patient used to smoke 2-4 packs per day for the past 50 years and is currently smoking 1/4 pack a day.     There seems to be a seasonal variation to the symptoms.    he is currently not on oxygen.     he does have a family history of chronic obstructive disease,  in his father & mother. Both smoked heavily.       The following portions of the patient's history were reviewed and updated as appropriate: allergies, current medications, past family history, past medical history, past social history, and past surgical history.    Review of Systems   HENT:  Positive for sinus pressure, sneezing and sore throat.    Respiratory:  Positive for cough, chest tightness, shortness of breath and wheezing.    Psychiatric/Behavioral:  Positive for sleep disturbance (some).    All other systems reviewed and are negative.      Past Medical History:   Diagnosis Date    Abnormal ECG August 2021    Anemia     Arthritis     Asthma July 2021    CHF (congestive heart failure)     Colon polyp 2023 nov    Congenital heart disease August 2021    Coronary artery disease 08/2021    Diabetes mellitus     Emphysema/COPD     GERD (gastroesophageal reflux disease)     Hyperlipidemia     Hypertension     Low back pain 1982    MI (myocardial infarction) 2021    RLS (restless legs  "syndrome) 1995    Sleep apnea     no CPAP    Wears dentures        Social History     Tobacco Use    Smoking status: Light Smoker     Packs/day: 0.25     Years: 50.00     Additional pack years: 0.00     Total pack years: 12.50     Types: Cigarettes     Start date: 2/24/1971     Passive exposure: Past    Smokeless tobacco: Never   Substance Use Topics    Alcohol use: Yes     Alcohol/week: 2.0 standard drinks of alcohol     Types: 2 Drinks containing 0.5 oz of alcohol per week     Comment: Per every two weeks         Objective:  Visit Vitals  /72   Pulse 90   Resp 18   Ht 172.7 cm (67.99\") Comment: pt reported   Wt 72 kg (158 lb 12.8 oz)   SpO2 96%   BMI 24.15 kg/m²       Physical Exam  Vitals reviewed.   Constitutional:       Appearance: He is well-developed.   HENT:      Head:      Comments: No acute lesions noted     Nose:      Comments: Mild nasal erythema noted.     Mouth/Throat:      Mouth: Mucous membranes are moist.   Neck:      Vascular: No JVD.   Cardiovascular:      Rate and Rhythm: Normal rate and regular rhythm.   Pulmonary:      Effort: Pulmonary effort is normal.      Breath sounds: No wheezing or rales.      Comments: Somewhat hyperresonant to percussion.  Somewhat decreased air entry.  Mild scattered wheezing noted.   Musculoskeletal:      Cervical back: Neck supple.      Right lower leg: No edema.      Left lower leg: No edema.      Comments: Gait was normal.   Skin:     General: Skin is warm and dry.   Neurological:      Mental Status: He is alert and oriented to person, place, and time.   Psychiatric:         Mood and Affect: Mood normal.         Behavior: Behavior normal.           Assessment/Plan:  Diagnoses and all orders for this visit:    1. Shortness of breath (Primary)    2. Chronic obstructive pulmonary disease, unspecified COPD type  -     Complete PFT - Pre & Post Bronchodilator; Future    3. Pulmonary emphysema, unspecified emphysema type  -     Alpha - 1 - Antitrypsin Phenotype; " Future    4. Nicotine dependence, cigarettes, uncomplicated  -      CT Chest Low Dose Cancer Screening WO; Future    Other orders  -     Budeson-Glycopyrrol-Formoterol (Breztri Aerosphere) 160-9-4.8 MCG/ACT aerosol inhaler; Inhale 2 puffs 2 (Two) Times a Day. Rinse mouth with water after use.  Dispense: 1 each; Refill: 5        Return in about 3 months (around 5/21/2024) for Recheck, Labs, Imaging, Overnight P Ox, PFT F/U, For Rachel Dao), ...Also 8 mths w/Manisha.    DISCUSSION(if any):  Last CT scan results was reviewed in great detail with the patient.  Results for orders placed during the hospital encounter of 09/15/21    CT Chest Pulmonary Embolism    Narrative  FINAL REPORT    TECHNIQUE:  Thin section axial images were obtained through the chest and  during the arterial phase of IV contrast administration. Coronal  3-D MIP reconstructed images were also provided.    CLINICAL HISTORY:  chest pain, elevated d-dimer.  soa    FINDINGS:  The pulmonary arteries are well-opacified and there is no  filling defect to indicate pulmonary embolism. The thoracic  aorta not well opacified, but no abnormality is seen.  There is  mild-to-moderate emphysema.  There is marked interlobular septal  thickening in lung bases consistent with interstitial pulmonary  edema.  There are small bilateral pleural effusions.    Impression  No pulmonary embolism.  Small pleural effusions and interstitial  pulmonary edema.    Authenticated by Lai De Los Santos M.D. on 09/15/2021 11:26:06 PM      I have also reviewed his referring provider's last note that mentions exertional dyspnea, emphysema and two-vessel coronary artery disease without ongoing anginal symptoms.     I also reviewed his last echocardiogram and shared the results with him.   Results for orders placed in visit on 01/13/22    Adult Transthoracic Echo Complete W/ Cont if Necessary Per Protocol    Interpretation Summary  · The left ventricular cavity is mildly dilated.  ·  Estimated left ventricular EF = 45% Left ventricular ejection fraction appears to be 41 - 45%. Left ventricular systolic function is low normal.  · Left ventricular diastolic function is consistent with (grade I) impaired relaxation.  · Mild aortic valve stenosis is present.  · Estimated right ventricular systolic pressure from tricuspid regurgitation is normal (<35 mmHg). Calculated right ventricular systolic pressure from tricuspid regurgitation is 15 mmHg.      Results for orders placed in visit on 08/16/21    Adult Transthoracic Echo Complete W/ Cont if Necessary Per Protocol    Interpretation Summary  · The left ventricular cavity is moderately dilated.  · The findings are consistent with dilated cardiomyopathy.  · Estimated left ventricular EF = 32% Left ventricular ejection fraction appears to be 31 - 35%. Left ventricular systolic function is moderately decreased.  · Left ventricular diastolic function is consistent with (grade III w/high LAP) reversible restrictive pattern.  · Left atrial volume is moderately increased.  · Mild aortic valve stenosis is present.  · There is mainly affecting the non-coronary, left coronary and right coronary cusp(s).      ===========================  ===========================    Last PFTs were reviewed. Moderate obstruction noted. Nov 2021.    PFTs were ordered for follow up visit.     Laboratory workup also showed   Lab Results   Component Value Date    HGB 13.0 01/10/2024    HGB 12.4 (L) 12/13/2023    HGB 12.7 (L) 07/28/2023   ,   Lab Results   Component Value Date    HCT 39.0 01/10/2024    HCT 36.5 (L) 12/13/2023    HCT 36.4 (L) 07/28/2023       Lab Results   Component Value Date    EOSABS 0.23 01/10/2024    EOSABS 0.23 12/13/2023    EOSABS 0.21 07/28/2023    & Laboratory workup also showed   Lab Results   Component Value Date    CO2 29.0 01/10/2024   ,   Lab Results   Component Value Date    HGBA1C 6.10 (H) 01/10/2024    HGBA1C 6.00 (H) 12/13/2023    HGBA1C 6.50 (H)  07/28/2023   ,   Lab Results   Component Value Date    TSH 1.02 01/06/2014     Laboratory workup also showed   Lab Results   Component Value Date    PHART 7.440 08/26/2021    JIK8OMY 36.2 08/26/2021    PO2ART 59.9 (L) 08/26/2021    E5OPLTHW 93.4 (L) 08/26/2021    CARBOXYHGB 1.5 08/26/2021     ===========================  ===========================    Orders as above.    I have told the patient, that in my view, shortness of breath is likely from underlying chronic obstructive lung disease.     Patient was given education and demonstration on how to use the medicine.     Side effects, of prescribed medicines, discussed.    Patient was also instructed on compliance and adherence with instructions.     I have discussed the need to quit smoking as soon as possible.    Patient was offered modalities such as Chantix/nicotine patches/Wellbutrin to aid in smoking cessation.    The patient will get back to us regarding the choice, once a decision has been taken.     Patient was given reading material, as appropriate.     The patient belongs to the risk group for which lung cancer screening has been recommended. We will try to make arrangements for the same and this will be indicated soon. This has been ordered.    Patient was asked to call with any concerns.     Patient will be followed clinically to assess for response to treatment and further recommendations will be made, based on response.    The following have been ordered, either before or at the time of the next visit.    CT of the chest  Laboratory work-up  PFTs/spirometry  Overnight pulse ox      Dictated utilizing Dragon dictation.    This document was electronically signed by Janett Tipton MD on 02/21/24 at 14:52 EST

## 2024-03-07 ENCOUNTER — TELEPHONE (OUTPATIENT)
Dept: PULMONOLOGY | Facility: CLINIC | Age: 64
End: 2024-03-07
Payer: MEDICARE

## 2024-03-07 DIAGNOSIS — G47.34 NOCTURNAL HYPOXIA: Primary | ICD-10-CM

## 2024-03-07 NOTE — TELEPHONE ENCOUNTER
I have discussed the results of the patients overnight pulse oximetry results with them, I have informed them that the physician would like for them to start on oxygen @ 2 LPM at night and they have agreed.     The Rx will be sent to DME and patient told to notify our office if they have not been contacted by DME within one week.

## 2024-03-11 DIAGNOSIS — R06.02 SHORTNESS OF BREATH: ICD-10-CM

## 2024-03-11 DIAGNOSIS — J44.9 CHRONIC OBSTRUCTIVE PULMONARY DISEASE, UNSPECIFIED COPD TYPE: ICD-10-CM

## 2024-03-12 ENCOUNTER — TELEPHONE (OUTPATIENT)
Dept: GASTROENTEROLOGY | Facility: CLINIC | Age: 64
End: 2024-03-12
Payer: MEDICARE

## 2024-03-12 NOTE — TELEPHONE ENCOUNTER
----- Message from Lissette Woods MA sent at 3/12/2024  9:51 AM EDT -----  Still having the same issues, cannot have regular bowel movements, still feel like there is something in the rectal area, feels it moving around, has tried what has been thus far recommended, he is having pain, abd pain, feels full.

## 2024-03-13 ENCOUNTER — TELEPHONE (OUTPATIENT)
Dept: GASTROENTEROLOGY | Facility: CLINIC | Age: 64
End: 2024-03-13
Payer: MEDICARE

## 2024-03-15 ENCOUNTER — OFFICE VISIT (OUTPATIENT)
Dept: GASTROENTEROLOGY | Facility: CLINIC | Age: 64
End: 2024-03-15
Payer: MEDICARE

## 2024-03-15 VITALS
DIASTOLIC BLOOD PRESSURE: 60 MMHG | OXYGEN SATURATION: 97 % | BODY MASS INDEX: 24.79 KG/M2 | WEIGHT: 163 LBS | HEART RATE: 96 BPM | SYSTOLIC BLOOD PRESSURE: 122 MMHG

## 2024-03-15 DIAGNOSIS — R14.0 BLOATING: ICD-10-CM

## 2024-03-15 DIAGNOSIS — K59.04 CHRONIC IDIOPATHIC CONSTIPATION: ICD-10-CM

## 2024-03-15 DIAGNOSIS — D12.6 TUBULAR ADENOMA OF COLON: ICD-10-CM

## 2024-03-15 DIAGNOSIS — Z12.5 PROSTATE CANCER SCREENING: ICD-10-CM

## 2024-03-15 DIAGNOSIS — R19.8 TENESMUS: Primary | ICD-10-CM

## 2024-03-15 DIAGNOSIS — R19.8 STRAINING WITH STOOLS: ICD-10-CM

## 2024-03-15 PROBLEM — T18.5XXA FOREIGN BODY OF RECTUM: Status: RESOLVED | Noted: 2024-01-18 | Resolved: 2024-03-15

## 2024-03-15 PROCEDURE — 3078F DIAST BP <80 MM HG: CPT | Performed by: PHYSICIAN ASSISTANT

## 2024-03-15 PROCEDURE — 1160F RVW MEDS BY RX/DR IN RCRD: CPT | Performed by: PHYSICIAN ASSISTANT

## 2024-03-15 PROCEDURE — 1159F MED LIST DOCD IN RCRD: CPT | Performed by: PHYSICIAN ASSISTANT

## 2024-03-15 PROCEDURE — 99214 OFFICE O/P EST MOD 30 MIN: CPT | Performed by: PHYSICIAN ASSISTANT

## 2024-03-15 PROCEDURE — 3074F SYST BP LT 130 MM HG: CPT | Performed by: PHYSICIAN ASSISTANT
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
## 2024-03-16 DIAGNOSIS — I10 BENIGN ESSENTIAL HYPERTENSION: ICD-10-CM

## 2024-03-18 RX ORDER — LISINOPRIL 10 MG/1
10 TABLET ORAL DAILY
Qty: 90 TABLET | Refills: 1 | Status: SHIPPED | OUTPATIENT
Start: 2024-03-18

## 2024-03-25 ENCOUNTER — HOSPITAL ENCOUNTER (OUTPATIENT)
Dept: CT IMAGING | Facility: HOSPITAL | Age: 64
Discharge: HOME | End: 2024-03-25
Admitting: INTERNAL MEDICINE
Payer: MEDICARE

## 2024-03-25 DIAGNOSIS — F17.210 NICOTINE DEPENDENCE, CIGARETTES, UNCOMPLICATED: ICD-10-CM

## 2024-03-25 PROCEDURE — 71271 CT THORAX LUNG CANCER SCR C-: CPT

## 2024-07-14 DIAGNOSIS — I10 BENIGN ESSENTIAL HYPERTENSION: ICD-10-CM

## 2024-07-14 DIAGNOSIS — E11.9 DIABETES MELLITUS WITHOUT COMPLICATION: ICD-10-CM

## 2024-07-14 DIAGNOSIS — I73.9 PAD (PERIPHERAL ARTERY DISEASE): ICD-10-CM

## 2024-07-15 RX ORDER — CILOSTAZOL 50 MG/1
50 TABLET ORAL 2 TIMES DAILY
Qty: 180 TABLET | Refills: 1 | Status: SHIPPED | OUTPATIENT
Start: 2024-07-15

## 2024-07-15 NOTE — TELEPHONE ENCOUNTER
Rx Refill Note  Requested Prescriptions     Pending Prescriptions Disp Refills    carvedilol (COREG) 12.5 MG tablet [Pharmacy Med Name: CARVEDILOL 12.5 MG TABLET] 180 tablet 1     Sig: TAKE 1 TABLET BY MOUTH TWICE A DAY WITH MEALS    glimepiride (AMARYL) 2 MG tablet [Pharmacy Med Name: GLIMEPIRIDE 2 MG TABLET] 90 tablet 1     Sig: TAKE 1 TABLET BY MOUTH DAILY WITH LUNCH.      Last office visit with prescribing clinician: 1/5/2024   Last telemedicine visit with prescribing clinician: Visit date not found   Next office visit with prescribing clinician: Visit date not found                         Would you like a call back once the refill request has been completed: [] Yes [] No    If the office needs to give you a call back, can they leave a voicemail: [] Yes [] No    Deepthi Padilla MA  07/15/24, 13:12 EDT

## 2024-07-16 RX ORDER — GLIMEPIRIDE 2 MG/1
2 TABLET ORAL
Qty: 30 TABLET | Refills: 0 | Status: SHIPPED | OUTPATIENT
Start: 2024-07-16

## 2024-07-16 RX ORDER — CARVEDILOL 12.5 MG/1
12.5 TABLET ORAL 2 TIMES DAILY WITH MEALS
Qty: 60 TABLET | Refills: 0 | Status: SHIPPED | OUTPATIENT
Start: 2024-07-16

## 2024-07-16 RX ORDER — ALBUTEROL SULFATE 90 UG/1
2 AEROSOL, METERED RESPIRATORY (INHALATION) EVERY 4 HOURS PRN
Qty: 18 G | Refills: 0 | Status: SHIPPED | OUTPATIENT
Start: 2024-07-16

## 2024-08-12 ENCOUNTER — OFFICE VISIT (OUTPATIENT)
Dept: INTERNAL MEDICINE | Facility: CLINIC | Age: 64
End: 2024-08-12
Payer: MEDICARE

## 2024-08-12 VITALS
TEMPERATURE: 98.2 F | HEIGHT: 68 IN | SYSTOLIC BLOOD PRESSURE: 103 MMHG | OXYGEN SATURATION: 97 % | DIASTOLIC BLOOD PRESSURE: 67 MMHG | WEIGHT: 162 LBS | RESPIRATION RATE: 20 BRPM | HEART RATE: 85 BPM | BODY MASS INDEX: 24.55 KG/M2

## 2024-08-12 DIAGNOSIS — I10 BENIGN ESSENTIAL HYPERTENSION: Primary | ICD-10-CM

## 2024-08-12 DIAGNOSIS — M54.50 LUMBOSACRAL PAIN: ICD-10-CM

## 2024-08-12 DIAGNOSIS — E78.2 MIXED HYPERLIPIDEMIA: ICD-10-CM

## 2024-08-12 DIAGNOSIS — E11.9 DIABETES MELLITUS WITHOUT COMPLICATION: ICD-10-CM

## 2024-08-12 DIAGNOSIS — G89.4 CHRONIC PAIN SYNDROME: ICD-10-CM

## 2024-08-12 PROCEDURE — 3044F HG A1C LEVEL LT 7.0%: CPT | Performed by: INTERNAL MEDICINE

## 2024-08-12 PROCEDURE — 1126F AMNT PAIN NOTED NONE PRSNT: CPT | Performed by: INTERNAL MEDICINE

## 2024-08-12 PROCEDURE — 99214 OFFICE O/P EST MOD 30 MIN: CPT | Performed by: INTERNAL MEDICINE

## 2024-08-12 PROCEDURE — G2211 COMPLEX E/M VISIT ADD ON: HCPCS | Performed by: INTERNAL MEDICINE

## 2024-08-12 PROCEDURE — 1159F MED LIST DOCD IN RCRD: CPT | Performed by: INTERNAL MEDICINE

## 2024-08-12 PROCEDURE — 1160F RVW MEDS BY RX/DR IN RCRD: CPT | Performed by: INTERNAL MEDICINE

## 2024-08-12 PROCEDURE — 3074F SYST BP LT 130 MM HG: CPT | Performed by: INTERNAL MEDICINE

## 2024-08-12 PROCEDURE — 3078F DIAST BP <80 MM HG: CPT | Performed by: INTERNAL MEDICINE

## 2024-08-12 RX ORDER — ATORVASTATIN CALCIUM 80 MG/1
80 TABLET, FILM COATED ORAL DAILY
Qty: 90 TABLET | Refills: 1 | Status: SHIPPED | OUTPATIENT
Start: 2024-08-12

## 2024-08-12 RX ORDER — CARVEDILOL 12.5 MG/1
12.5 TABLET ORAL 2 TIMES DAILY WITH MEALS
Qty: 180 TABLET | Refills: 1 | Status: SHIPPED | OUTPATIENT
Start: 2024-08-12

## 2024-08-12 RX ORDER — BLOOD-GLUCOSE METER
1 KIT MISCELLANEOUS DAILY
Qty: 1 EACH | Refills: 1 | Status: SHIPPED | OUTPATIENT
Start: 2024-08-12

## 2024-08-12 RX ORDER — LISINOPRIL 10 MG/1
10 TABLET ORAL DAILY
Qty: 90 TABLET | Refills: 1 | Status: SHIPPED | OUTPATIENT
Start: 2024-08-12

## 2024-08-12 RX ORDER — GLIMEPIRIDE 2 MG/1
2 TABLET ORAL
Qty: 90 TABLET | Refills: 1 | Status: SHIPPED | OUTPATIENT
Start: 2024-08-12

## 2024-08-12 RX ORDER — LINACLOTIDE 72 UG/1
72 CAPSULE, GELATIN COATED ORAL
COMMUNITY
Start: 2024-04-19

## 2024-08-12 NOTE — PROGRESS NOTES
"Chief Complaint  Hypertension (Patient has had juice and soda this am) and Hyperlipidemia    Subjective        Tristan Castle presents to [facility]    HPI: Patient is here to follow up on the blood pressure  The patient is taking the blood pressure medications as prescribed and has had no side effects. The patient is also here to follow up on the cholesterol and sugar and is  due to get lab work done .  The patient also needs refills on medications  he complains of chronic back pain ,  and ania leg pain - due to pad and was seen by cardiology and is on pletal , he states he needs pain clinic referral  Hyperlipidemia   Pertinent negatives include no chest pain or shortness of breath.   Hypertension   Pertinent negatives include no chest pain, palpitations or shortness of breath.  Answers submitted by the patient for this visit:  Primary Reason for Visit (Submitted on 8/11/2024)  What is the primary reason for your visit?: Extremity Pain  Lower Extremity Injury Questionnaire (Submitted on 8/11/2024)  Chief Complaint: Extremity pain  Injury: No    Objective   Vital Signs:  /67   Pulse 85   Temp 98.2 °F (36.8 °C)   Resp 20   Ht 172.7 cm (67.99\")   Wt 73.5 kg (162 lb)   SpO2 97%   BMI 24.64 kg/m²   Estimated body mass index is 24.64 kg/m² as calculated from the following:    Height as of this encounter: 172.7 cm (67.99\").    Weight as of this encounter: 73.5 kg (162 lb).       BMI is within normal parameters. No other follow-up for BMI required.      Physical Exam  Vitals and nursing note reviewed.   Constitutional:       General: He is not in acute distress.     Appearance: Normal appearance. He is not diaphoretic.   HENT:      Head: Normocephalic and atraumatic.      Right Ear: External ear normal.      Left Ear: External ear normal.      Nose: Nose normal.   Eyes:      Extraocular Movements: Extraocular movements intact.      Conjunctiva/sclera: Conjunctivae normal.   Neck:    "   Trachea: Trachea normal.   Cardiovascular:      Rate and Rhythm: Normal rate and regular rhythm.      Heart sounds: Normal heart sounds.   Pulmonary:      Effort: Pulmonary effort is normal. No respiratory distress.   Abdominal:      General: Abdomen is flat.   Musculoskeletal:         General: Deformity present.      Cervical back: Neck supple.      Comments: Moves all limbs   Skin:     General: Skin is warm and dry.      Findings: No erythema.   Neurological:      Mental Status: He is alert and oriented to person, place, and time.      Comments: No gross motor or sensory deficits        Result Review :    The following data was reviewed by: Marybel Fleming MD on 08/12/2024:  Common labs          12/13/2023    09:57 1/10/2024    12:36   Common Labs   Glucose 125  99    BUN 15  19    Creatinine 1.25  1.21    Sodium 137  136    Potassium 4.8  4.2    Chloride 101  99    Calcium 9.4  9.6    Albumin 3.9  4.3    Total Bilirubin 0.4  0.4    Alkaline Phosphatase 76  81    AST (SGOT) 17  21    ALT (SGPT) 18  18    WBC 7.74  6.82    Hemoglobin 12.4  13.0    Hematocrit 36.5  39.0    Platelets 331  361    Total Cholesterol 130  146    Triglycerides 76  110    HDL Cholesterol 44  48    LDL Cholesterol  71  78    Hemoglobin A1C 6.00  6.10    Microalbumin, Urine <1.2  2.6                   Assessment and Plan     Diagnoses and all orders for this visit:    1. Benign essential hypertension (Primary)  -     carvedilol (COREG) 12.5 MG tablet; Take 1 tablet by mouth 2 (Two) Times a Day With Meals.  Dispense: 180 tablet; Refill: 1  -     lisinopril (PRINIVIL,ZESTRIL) 10 MG tablet; Take 1 tablet by mouth Daily.  Dispense: 90 tablet; Refill: 1    2. Mixed hyperlipidemia  -     CBC (No Diff)  -     Comprehensive Metabolic Panel  -     Lipid Panel  -     atorvastatin (LIPITOR) 80 MG tablet; Take 1 tablet by mouth Daily.  Dispense: 90 tablet; Refill: 1    3. Diabetes mellitus without complication  -     Hemoglobin A1c  -     Microalbumin /  Creatinine Urine Ratio - Urine, Clean Catch  -     glimepiride (AMARYL) 2 MG tablet; Take 1 tablet by mouth Daily With Lunch.  Dispense: 90 tablet; Refill: 1  -     glucose monitor monitoring kit; Use 1 each Daily.  Dispense: 1 each; Refill: 1  -     glucose blood test strip; Check sugar once a day  Dispense: 30 each; Refill: 12  -     Lancets 30G misc; Check sugar daily  Dispense: 30 each; Refill: 12    4. Lumbosacral pain  -     Cancel: Ambulatory Referral to Pain Management Clinic  -     Ambulatory Referral to Pain Management    5. Chronic pain syndrome  -     Ambulatory Referral to Pain Management    Plan:  1.  Benign essential hypertension: Will continue current medication, low-sodium diet advised, Counseled to regularly check BP at home with goal averaging <130/80.   2.mixed hyperlipidemia: will obtain   fasting CMP and lipid panel.  Diet and exercise counseled,  Will continue current medications  3. Diabetes  Mellitus  : will obtain   fasting CMP  and hba1c   6.1 , diet and exercise counseled , Will continue current medications  4.  Lumbosacral pain : refer to pain clinic  5. Chronic pain syndrome : refer to pain clinic           Follow Up     Return in about 4 months (around 12/19/2024).  Patient was given instructions and counseling regarding his condition or for health maintenance advice. Please see specific information pulled into the AVS if appropriate.

## 2024-10-02 ENCOUNTER — LAB (OUTPATIENT)
Dept: LAB | Facility: HOSPITAL | Age: 64
End: 2024-10-02
Payer: MEDICARE

## 2024-10-02 DIAGNOSIS — Z12.5 PROSTATE CANCER SCREENING: ICD-10-CM

## 2024-10-02 DIAGNOSIS — J43.9 PULMONARY EMPHYSEMA, UNSPECIFIED EMPHYSEMA TYPE: ICD-10-CM

## 2024-10-02 LAB
ALBUMIN SERPL-MCNC: 4 G/DL (ref 3.5–5.2)
ALBUMIN UR-MCNC: <1.2 MG/DL
ALBUMIN/GLOB SERPL: 1.3 G/DL
ALP SERPL-CCNC: 100 U/L (ref 39–117)
ALT SERPL W P-5'-P-CCNC: 44 U/L (ref 1–41)
ANION GAP SERPL CALCULATED.3IONS-SCNC: 10.9 MMOL/L (ref 5–15)
AST SERPL-CCNC: 34 U/L (ref 1–40)
BILIRUB SERPL-MCNC: 0.2 MG/DL (ref 0–1.2)
BUN SERPL-MCNC: 34 MG/DL (ref 8–23)
BUN/CREAT SERPL: 27.4 (ref 7–25)
CALCIUM SPEC-SCNC: 9.1 MG/DL (ref 8.6–10.5)
CHLORIDE SERPL-SCNC: 98 MMOL/L (ref 98–107)
CHOLEST SERPL-MCNC: 140 MG/DL (ref 0–200)
CO2 SERPL-SCNC: 25.1 MMOL/L (ref 22–29)
CREAT SERPL-MCNC: 1.24 MG/DL (ref 0.76–1.27)
CREAT UR-MCNC: 42.9 MG/DL
DEPRECATED RDW RBC AUTO: 44.3 FL (ref 37–54)
EGFRCR SERPLBLD CKD-EPI 2021: 64.9 ML/MIN/1.73
ERYTHROCYTE [DISTWIDTH] IN BLOOD BY AUTOMATED COUNT: 13.4 % (ref 12.3–15.4)
GLOBULIN UR ELPH-MCNC: 3.2 GM/DL
GLUCOSE SERPL-MCNC: 134 MG/DL (ref 65–99)
HBA1C MFR BLD: 6.3 % (ref 4.8–5.6)
HCT VFR BLD AUTO: 36.6 % (ref 37.5–51)
HDLC SERPL-MCNC: 38 MG/DL (ref 40–60)
HGB BLD-MCNC: 12.3 G/DL (ref 13–17.7)
LDLC SERPL CALC-MCNC: 81 MG/DL (ref 0–100)
LDLC/HDLC SERPL: 2.09 {RATIO}
MCH RBC QN AUTO: 30.2 PG (ref 26.6–33)
MCHC RBC AUTO-ENTMCNC: 33.6 G/DL (ref 31.5–35.7)
MCV RBC AUTO: 89.9 FL (ref 79–97)
MICROALBUMIN/CREAT UR: NORMAL MG/G{CREAT}
PLATELET # BLD AUTO: 375 10*3/MM3 (ref 140–450)
PMV BLD AUTO: 9 FL (ref 6–12)
POTASSIUM SERPL-SCNC: 4.7 MMOL/L (ref 3.5–5.2)
PROT SERPL-MCNC: 7.2 G/DL (ref 6–8.5)
PSA SERPL-MCNC: 1.49 NG/ML (ref 0–4)
RBC # BLD AUTO: 4.07 10*6/MM3 (ref 4.14–5.8)
SODIUM SERPL-SCNC: 134 MMOL/L (ref 136–145)
TRIGL SERPL-MCNC: 113 MG/DL (ref 0–150)
VLDLC SERPL-MCNC: 21 MG/DL (ref 5–40)
WBC NRBC COR # BLD AUTO: 7.07 10*3/MM3 (ref 3.4–10.8)

## 2024-10-02 PROCEDURE — 82104 ALPHA-1-ANTITRYPSIN PHENO: CPT

## 2024-10-02 PROCEDURE — 82570 ASSAY OF URINE CREATININE: CPT | Performed by: INTERNAL MEDICINE

## 2024-10-02 PROCEDURE — 85027 COMPLETE CBC AUTOMATED: CPT | Performed by: INTERNAL MEDICINE

## 2024-10-02 PROCEDURE — 80053 COMPREHEN METABOLIC PANEL: CPT | Performed by: INTERNAL MEDICINE

## 2024-10-02 PROCEDURE — 83036 HEMOGLOBIN GLYCOSYLATED A1C: CPT | Performed by: INTERNAL MEDICINE

## 2024-10-02 PROCEDURE — 82043 UR ALBUMIN QUANTITATIVE: CPT | Performed by: INTERNAL MEDICINE

## 2024-10-02 PROCEDURE — G0103 PSA SCREENING: HCPCS

## 2024-10-02 PROCEDURE — 80061 LIPID PANEL: CPT | Performed by: INTERNAL MEDICINE

## 2024-10-02 PROCEDURE — 82103 ALPHA-1-ANTITRYPSIN TOTAL: CPT

## 2024-10-03 RX ORDER — ALBUTEROL SULFATE 90 UG/1
2 INHALANT RESPIRATORY (INHALATION) EVERY 4 HOURS PRN
Qty: 18 G | Refills: 3 | Status: SHIPPED | OUTPATIENT
Start: 2024-10-03

## 2024-10-09 LAB
A1AT PHENOTYP SERPL IFE: NORMAL
A1AT SERPL-MCNC: 161 MG/DL (ref 101–187)

## 2024-10-21 RX ORDER — ALBUTEROL SULFATE 90 UG/1
2 INHALANT RESPIRATORY (INHALATION) EVERY 4 HOURS PRN
Qty: 8 G | Refills: 1 | Status: SHIPPED | OUTPATIENT
Start: 2024-10-21

## 2024-11-14 ENCOUNTER — OFFICE VISIT (OUTPATIENT)
Dept: INTERNAL MEDICINE | Facility: CLINIC | Age: 64
End: 2024-11-14
Payer: MEDICARE

## 2024-11-14 VITALS
TEMPERATURE: 97.8 F | SYSTOLIC BLOOD PRESSURE: 123 MMHG | OXYGEN SATURATION: 98 % | BODY MASS INDEX: 24.1 KG/M2 | WEIGHT: 159 LBS | RESPIRATION RATE: 16 BRPM | HEART RATE: 71 BPM | DIASTOLIC BLOOD PRESSURE: 73 MMHG | HEIGHT: 68 IN

## 2024-11-14 DIAGNOSIS — R05.2 SUBACUTE COUGH: ICD-10-CM

## 2024-11-14 DIAGNOSIS — Z00.00 MEDICARE ANNUAL WELLNESS VISIT, SUBSEQUENT: Primary | ICD-10-CM

## 2024-11-14 DIAGNOSIS — I10 BENIGN ESSENTIAL HYPERTENSION: ICD-10-CM

## 2024-11-14 DIAGNOSIS — Z23 NEED FOR IMMUNIZATION AGAINST INFLUENZA: ICD-10-CM

## 2024-11-14 DIAGNOSIS — J01.01 ACUTE RECURRENT MAXILLARY SINUSITIS: ICD-10-CM

## 2024-11-14 DIAGNOSIS — H66.003 ACUTE SUPPURATIVE OTITIS MEDIA OF BOTH EARS WITHOUT SPONTANEOUS RUPTURE OF TYMPANIC MEMBRANES, RECURRENCE NOT SPECIFIED: ICD-10-CM

## 2024-11-14 RX ORDER — CEPHALEXIN 500 MG/1
500 CAPSULE ORAL 3 TIMES DAILY
Qty: 30 CAPSULE | Refills: 0 | Status: SHIPPED | OUTPATIENT
Start: 2024-11-14 | End: 2024-11-24

## 2024-11-14 NOTE — PATIENT INSTRUCTIONS
Advance Care Planning and Advance Directives     You make decisions on a daily basis - decisions about where you want to live, your career, your home, your life. Perhaps one of the most important decisions you face is your choice for future medical care. Take time to talk with your family and your healthcare team and start planning today.  Advance Care Planning is a process that can help you:  Understand possible future healthcare decisions in light of your own experiences  Reflect on those decision in light of your goals and values  Discuss your decisions with those closest to you and the healthcare professionals that care for you  Make a plan by creating a document that reflects your wishes    Surrogate Decision Maker  In the event of a medical emergency, which has left you unable to communicate or to make your own decisions, you would need someone to make decisions for you.  It is important to discuss your preferences for medical treatment with this person while you are in good health.     Qualities of a surrogate decision maker:  Willing to take on this role and responsibility  Knows what you want for future medical care  Willing to follow your wishes even if they don't agree with them  Able to make difficult medical decisions under stressful circumstances    Advance Directives  These are legal documents you can create that will guide your healthcare team and decision maker(s) when needed. These documents can be stored in the electronic medical record.    Living Will - a legal document to guide your care if you have a terminal condition or a serious illness and are unable to communicate. States vary by statute in document names/types, but most forms may include one or more of the following:        -  Directions regarding life-prolonging treatments        -  Directions regarding artificially provided nutrition/hydration        -  Choosing a healthcare decision maker        -  Direction regarding organ/tissue  donation    Durable Power of  for Healthcare - this document names an -in-fact to make medical decisions for you, but it may also allow this person to make personal and financial decisions for you. Please seek the advice of an  if you need this type of document.    **Advance Directives are not required and no one may discriminate against you if you do not sign one.    Medical Orders  Many states allow specific forms/orders signed by your physician to record your wishes for medical treatment in your current state of health. This form, signed in personal communication with your physician, addresses resuscitation and other medical interventions that you may or may not want.      For more information or to schedule a time with a [facility] Advance Care Planning Facilitator contact: [employer] or call [phone] and someone will contact you directly.    Medicare Wellness  Personal Prevention Plan of Service     Date of Office Visit:    Encounter Provider:  Marybel Fleming MD  Place of Service:  [facility]  Patient Name: Tristan Castle  :  1960    As part of the Medicare Wellness portion of your visit today, we are providing you with this personalized preventive plan of services (PPPS). This plan is based upon recommendations of the [place] Preventive Services Task Force (USPSTF) and the Advisory Committee on Immunization Practices (ACIP).    This lists the preventive care services that should be considered, and provides dates of when you are due. Items listed as completed are up-to-date and do not require any further intervention.    Health Maintenance   Topic Date Due   • DIABETIC EYE EXAM  Never done   • HEPATITIS C SCREENING  Never done   • COLORECTAL CANCER SCREENING  2024   • INFLUENZA VACCINE  2024   • ZOSTER VACCINE (1 of 2) 2024 (Originally 2/10/2010)   • COVID-19 Vaccine ( season) 2025 (Originally  9/1/2024)   • HEMOGLOBIN A1C  04/02/2025   • LIPID PANEL  10/02/2025   • ANNUAL WELLNESS VISIT  11/14/2025   • TDAP/TD VACCINES (2 - Td or Tdap) 11/05/2030   • Pneumococcal Vaccine 0-64  Completed   • URINE MICROALBUMIN  Discontinued   • LUNG CANCER SCREENING  Discontinued       No orders of the defined types were placed in this encounter.      No follow-ups on file.

## 2024-11-14 NOTE — PROGRESS NOTES
Subjective   The ABCs of the Annual Wellness Visit  Medicare Wellness Visit    Chief Complaint   Patient presents with    Medicare Wellness-subsequent    Sinus Problem     Green drainage     Nasal Congestion     Sinus headache with pressure    Cough         Tristan Castle is a 64 y.o. patient who presents for a Medicare Wellness Visit and physical .  He needs a flu shot    The following portions of the patient's history were reviewed and   updated as appropriate: allergies, current medications, past family history, past medical history, past social history, past surgical history, and problem list.    Compared to one year ago, the patient's physical   health is the same.  Compared to one year ago, the patient's mental   health is the same.    Recent Hospitalizations:  He was not admitted to the hospital during the last year.     Current Medical Providers:  Patient Care Team:  Marybel Fleming MD as PCP - General (Internal Medicine)    Outpatient Medications Prior to Visit   Medication Sig Dispense Refill    albuterol sulfate  (90 Base) MCG/ACT inhaler TAKE 2 PUFFS BY MOUTH EVERY 4 HOURS AS NEEDED FOR WHEEZE 8 g 1    aspirin (aspirin) 81 MG EC tablet Take 1 tablet by mouth Daily. 90 tablet 3    atorvastatin (LIPITOR) 80 MG tablet Take 1 tablet by mouth Daily. 90 tablet 1    carvedilol (COREG) 12.5 MG tablet Take 1 tablet by mouth 2 (Two) Times a Day With Meals. 180 tablet 1    glimepiride (AMARYL) 2 MG tablet Take 1 tablet by mouth Daily With Lunch. 90 tablet 1    glucose blood test strip Check sugar once a day 30 each 12    glucose monitor monitoring kit Use 1 each Daily. 1 each 1    Lancets 30G misc Check sugar daily 30 each 12    lisinopril (PRINIVIL,ZESTRIL) 10 MG tablet Take 1 tablet by mouth Daily. 90 tablet 1    Budeson-Glycopyrrol-Formoterol (Breztri Aerosphere) 160-9-4.8 MCG/ACT aerosol inhaler Inhale 2 puffs 2 (Two) Times a Day. Rinse mouth with water after use. 1 each 5    cilostazol (PLETAL) 50 MG  "tablet TAKE 1 TABLET BY MOUTH TWICE A  tablet 1    Linzess 72 MCG capsule capsule Take 1 capsule by mouth Every Morning Before Breakfast.       No facility-administered medications prior to visit.     No opioid medication identified on active medication list. I have reviewed chart for other potential  high risk medication/s and harmful drug interactions in the elderly.      Aspirin is on active medication list. Aspirin use is indicated based on review of current medical condition/s. Pros and cons of this therapy have been discussed today. Benefits of this medication outweigh potential harm.  Patient has been encouraged to continue taking this medication.  .      Patient Active Problem List   Diagnosis    Benign essential HTN    SOB (shortness of breath)    Tobacco abuse    Centrilobular emphysema    Chronic systolic congestive heart failure    Coronary artery disease involving coronary bypass graft of native heart without angina pectoris    Abnormal fasting glucose    Anxiety disorder    Avitaminosis D    Chronic pain associated with significant psychosocial dysfunction    Hyperlipidemia    Depression, major, recurrent    Fatigue    LBP (low back pain)    Nerve pain    Poor appetite    Weight loss, abnormal    PAD (peripheral artery disease)    Positive colorectal cancer screening using Cologuard test    Abnormal CT scan, colon    Anal or rectal pain    Tenesmus    Rectal bleeding     Advance Care Planning Advance Directive is not on file.  ACP discussion was held with the patient during this visit. Patient does not have an advance directive, declines further assistance.            Objective   Vitals:    11/14/24 1547   BP: 123/73   Pulse: 71   Resp: 16   Temp: 97.8 °F (36.6 °C)   SpO2: 98%   Weight: 72.1 kg (159 lb)   Height: 172.7 cm (67.99\")   PainSc: 0-No pain       Estimated body mass index is 24.18 kg/m² as calculated from the following:    Height as of this encounter: 172.7 cm (67.99\").    Weight as of " this encounter: 72.1 kg (159 lb).    BMI is within normal parameters. No other follow-up for BMI required.       Physical Examination  Vitals and nursing note reviewed  General appearance: Normocephalic and nontraumatic  HEENT: External inspection of eyes, ears and nose appears benign, hearing appears intact   Bilateral maxillary sinusitis with rhinorrhea and redness bilateral ear canals  Neck: Neck appears supple, trachea in midline, thyroid appears not enlarged  Respiratory: Respiratory effort appears normal  Musculoskeletal: Moving all limbs  Range of motion of visible joints appears within normal  CNS: No gross motor or sensory deficits  No gross cranial nerve deficits  No tremors  Psychiatry: Alert and oriented x3  Memory appears intact  Mood and affect appears normal  Insight appears normal       Does the patient have evidence of cognitive impairment? No  Lab Results   Component Value Date    TRIG 113 10/02/2024    HDL 38 (L) 10/02/2024    LDL 81 10/02/2024    VLDL 21 10/02/2024    HGBA1C 6.30 (H) 10/02/2024                                                                                                Health  Risk Assessment    Smoking Status:  Social History     Tobacco Use   Smoking Status Light Smoker    Current packs/day: 0.25    Average packs/day: 0.3 packs/day for 53.7 years (13.4 ttl pk-yrs)    Types: Cigarettes    Start date: 2/24/1971    Passive exposure: Past   Smokeless Tobacco Never   Tobacco Comments    Cut down to less than 5 per day     Alcohol Consumption:  Social History     Substance and Sexual Activity   Alcohol Use Yes    Alcohol/week: 2.0 standard drinks of alcohol    Types: 2 Drinks containing 0.5 oz of alcohol per week    Comment: Per every two weeks       Fall Risk Screen  STEADI Fall Risk Assessment was completed, and patient is at MODERATE risk for falls. Assessment completed on:11/14/2024    Depression Screening   Little interest or pleasure in doing things? Several days   Feeling  down, depressed, or hopeless? Not at all   PHQ-2 Total Score 1      Health Habits and Functional and Cognitive Screenin/14/2024     3:51 PM   Functional & Cognitive Status   Do you have difficulty preparing food and eating? No   Do you have difficulty bathing yourself, getting dressed or grooming yourself? No   Do you have difficulty using the toilet? No   Do you have difficulty moving around from place to place? No   Do you have trouble with steps or getting out of a bed or a chair? No   Current Diet Well Balanced Diet   Dental Exam Up to date   Eye Exam Not up to date   Exercise (times per week) 5 times per week   Current Exercises Include Walking;Other   Do you need help using the phone?  No   Are you deaf or do you have serious difficulty hearing?  Yes   Do you need help to go to places out of walking distance? No   Do you need help shopping? No   Do you need help preparing meals?  No   Do you need help with housework?  No   Do you need help with laundry? No   Do you need help taking your medications? No   Do you need help managing money? No   Do you ever drive or ride in a car without wearing a seat belt? No   Have you felt unusual stress, anger or loneliness in the last month? No   Who do you live with? Spouse   If you need help, do you have trouble finding someone available to you? No   Have you been bothered in the last four weeks by sexual problems? No   Do you have difficulty concentrating, remembering or making decisions? No           Age-appropriate Screening Schedule:  Refer to the list below for future screening recommendations based on patient's age, sex and/or medical conditions. Orders for these recommended tests are listed in the plan section. The patient has been provided with a written plan.    Health Maintenance List  Health Maintenance   Topic Date Due    DIABETIC EYE EXAM  Never done    HEPATITIS C SCREENING  Never done    COLORECTAL CANCER SCREENING  2024    ZOSTER VACCINE (1  of 2) 11/14/2024 (Originally 2/10/2010)    COVID-19 Vaccine (4 - 2024-25 season) 02/03/2025 (Originally 9/1/2024)    HEMOGLOBIN A1C  04/02/2025    LIPID PANEL  10/02/2025    ANNUAL WELLNESS VISIT  11/14/2025    TDAP/TD VACCINES (2 - Td or Tdap) 11/05/2030    Pneumococcal Vaccine 0-64  Completed    INFLUENZA VACCINE  Completed    URINE MICROALBUMIN  Discontinued    LUNG CANCER SCREENING  Discontinued                                                                                                                                                CMS Preventative Services Quick Reference  Risk Factors Identified During Encounter  Immunizations Discussed/Encouraged: RSV (Respiratory Syncytial Virus)  Dental Screening Recommended  Vision Screening Recommended    The above risks/problems have been discussed with the patient.  Pertinent information has been shared with the patient in the After Visit Summary.  An After Visit Summary and PPPS were made available to the patient.    Follow Up:   Next Medicare Wellness visit to be scheduled in 1 year.         Additional E&M Note during same encounter follows:  Patient has additional, significant, and separately identifiable condition(s)/problem(s) that require work above and beyond the Medicare Wellness Visit     Chief Complaint  Medicare Wellness-subsequent, Sinus Problem (Green drainage ), Nasal Congestion (Sinus headache with pressure), and Cough    Subjective   HPI  Tristan is also being seen today for an annual adult preventative physical exam.  and Tristan is also being seen today for additional medical problem/s.  HPI: Patient is here to follow up on the blood pressure  The patient is taking the blood pressure medications as prescribed and has had no side effects. The patient also complains of ear pain ,yellow  nasal discharge ,   And sinus congestion since the past few days,  patient also complains of cough with  sputum   .                Objective   Vital Signs:  /73    "Pulse 71   Temp 97.8 °F (36.6 °C)   Resp 16   Ht 172.7 cm (67.99\")   Wt 72.1 kg (159 lb)   SpO2 98%   BMI 24.18 kg/m²   Physical Exam  Vitals and nursing note reviewed.   Constitutional:       General: He is not in acute distress.     Appearance: Normal appearance. He is not diaphoretic.   HENT:      Head: Normocephalic and atraumatic.      Right Ear: External ear normal. Tympanic membrane is erythematous.      Left Ear: External ear normal. Tympanic membrane is erythematous.      Nose: Nose normal. Rhinorrhea present.      Right Sinus: Maxillary sinus tenderness present.      Left Sinus: Maxillary sinus tenderness present.   Eyes:      Extraocular Movements: Extraocular movements intact.      Conjunctiva/sclera: Conjunctivae normal.   Neck:      Trachea: Trachea normal.   Cardiovascular:      Rate and Rhythm: Normal rate and regular rhythm.      Heart sounds: Normal heart sounds.   Pulmonary:      Effort: Pulmonary effort is normal. No respiratory distress.   Abdominal:      General: Abdomen is flat.   Musculoskeletal:      Cervical back: Neck supple.      Comments: Moves all limbs   Skin:     General: Skin is warm and dry.      Findings: No erythema.   Neurological:      Mental Status: He is alert and oriented to person, place, and time.      Comments: No gross motor or sensory deficits         The following data was reviewed by: Marybel Fleming MD on 11/14/2024:              Assessment and Plan Additional age appropriate preventative wellness advice topics were discussed during today's preventative wellness exam(some topics already addressed during AWV portion of the note above):    Physical Activity: Advised cardiovascular activity 150 minutes per week as tolerated. (example brisk walk for 30 minutes, 5 days a week).     Nutrition: Discussed nutrition plan with patient. Information shared in after visit summary. Goal is for a well balanced diet to enhance overall health.     Healthy Weight: Discussed current " and goal BMI with patient. Steps to attain this goal discussed. Information shared in after visit summary.           Medicare annual wellness visit, subsequent  Plan:  1.physical: Physical exam conducted today, reviewed fasting lab work,   Impression: healthy adult Patient. Currently, patient eats a healthy diet. Screening lab work includes glucose, lipid profile and complete blood count . The risks and benefits of immunizations were discussed and immunizations are up to date. Advice and education were given regarding nutrition and aerobic exercise. Patient discussion: discussed with the patient.  Annual Wellness Visit ,physical  with preventive exam as well as age and risk appropriate counseling completed.   Advance Directive Planning: paperwork and instructions were given to the patient.   Patient Discussion: plan discussed with the patient, follow-up visit needed in one year. Medication Review and medication list updated         Acute recurrent maxillary sinusitis  Will start oral antibiotics  Orders:    cephalexin (KEFLEX) 500 MG capsule; Take 1 capsule by mouth 3 (Three) Times a Day for 10 days.    Acute suppurative otitis media of both ears without spontaneous rupture of tympanic membranes, recurrence not specified  Will start oral antibiotics  Orders:    cephalexin (KEFLEX) 500 MG capsule; Take 1 capsule by mouth 3 (Three) Times a Day for 10 days.    Subacute cough  Otc cough medications        Need for immunization against influenza    Orders:    Fluzone >6mos ([id number])    Benign essential hypertension  Hypertension is stable and controlled  Continue current treatment regimen.  Blood pressure will be reassessed in 6 months.                 Follow Up   Return in about 1 year (around 11/14/2025) for Medicare Wellness.  Patient was given instructions and counseling regarding his condition or for health maintenance advice. Please see specific information pulled into the AVS if appropriate.

## 2024-12-18 ENCOUNTER — TELEMEDICINE (OUTPATIENT)
Dept: INTERNAL MEDICINE | Facility: CLINIC | Age: 64
End: 2024-12-18
Payer: MEDICARE

## 2024-12-18 DIAGNOSIS — J06.0 ACUTE LARYNGOPHARYNGITIS: ICD-10-CM

## 2024-12-18 DIAGNOSIS — D64.9 ANEMIA, UNSPECIFIED TYPE: ICD-10-CM

## 2024-12-18 DIAGNOSIS — E78.2 MIXED HYPERLIPIDEMIA: ICD-10-CM

## 2024-12-18 DIAGNOSIS — J01.01 ACUTE RECURRENT MAXILLARY SINUSITIS: ICD-10-CM

## 2024-12-18 DIAGNOSIS — E11.9 DIABETES MELLITUS WITHOUT COMPLICATION: ICD-10-CM

## 2024-12-18 DIAGNOSIS — I10 BENIGN ESSENTIAL HYPERTENSION: Primary | ICD-10-CM

## 2024-12-18 DIAGNOSIS — J20.9 ACUTE BRONCHITIS, UNSPECIFIED ORGANISM: ICD-10-CM

## 2024-12-18 PROCEDURE — 3044F HG A1C LEVEL LT 7.0%: CPT | Performed by: INTERNAL MEDICINE

## 2024-12-18 PROCEDURE — 99214 OFFICE O/P EST MOD 30 MIN: CPT | Performed by: INTERNAL MEDICINE

## 2024-12-18 PROCEDURE — 1126F AMNT PAIN NOTED NONE PRSNT: CPT | Performed by: INTERNAL MEDICINE

## 2024-12-18 PROCEDURE — G2211 COMPLEX E/M VISIT ADD ON: HCPCS | Performed by: INTERNAL MEDICINE

## 2024-12-18 PROCEDURE — 1160F RVW MEDS BY RX/DR IN RCRD: CPT | Performed by: INTERNAL MEDICINE

## 2024-12-18 PROCEDURE — 1159F MED LIST DOCD IN RCRD: CPT | Performed by: INTERNAL MEDICINE

## 2024-12-18 RX ORDER — ATORVASTATIN CALCIUM 80 MG/1
80 TABLET, FILM COATED ORAL DAILY
Qty: 90 TABLET | Refills: 1 | Status: SHIPPED | OUTPATIENT
Start: 2024-12-18

## 2024-12-18 RX ORDER — ALBUTEROL SULFATE 90 UG/1
2 INHALANT RESPIRATORY (INHALATION) EVERY 4 HOURS PRN
Qty: 8 G | Refills: 4 | Status: SHIPPED | OUTPATIENT
Start: 2024-12-18

## 2024-12-18 RX ORDER — CARVEDILOL 12.5 MG/1
12.5 TABLET ORAL 2 TIMES DAILY WITH MEALS
Qty: 180 TABLET | Refills: 1 | Status: SHIPPED | OUTPATIENT
Start: 2024-12-18

## 2024-12-18 RX ORDER — AZITHROMYCIN 250 MG/1
TABLET, FILM COATED ORAL
Qty: 6 TABLET | Refills: 0 | Status: SHIPPED | OUTPATIENT
Start: 2024-12-18

## 2024-12-18 RX ORDER — LISINOPRIL 10 MG/1
10 TABLET ORAL DAILY
Qty: 90 TABLET | Refills: 1 | Status: SHIPPED | OUTPATIENT
Start: 2024-12-18

## 2024-12-18 RX ORDER — GLIMEPIRIDE 2 MG/1
2 TABLET ORAL
Qty: 90 TABLET | Refills: 1 | Status: SHIPPED | OUTPATIENT
Start: 2024-12-18

## 2024-12-18 NOTE — PROGRESS NOTES
You have chosen to receive care through a video visit. Do you consent to use a video  visit for your medical care today? Yes  Parties active with  visit  Physician: Marybel Shipley MD  Nurse: Debi Coyne CMA  Patient : tristan javier  Location of Provider : Clinic - [place], [place]  Location of Patient : at home  Patient presents during the COVID-19 pandemic/federally declared state of public health emergency.   was seen via telehealth using real-time video conferencing technology  This service was conducted via  Video Visit  Chief Complaint  Hypertension, Cough (Yellow green since yesterday), Nasal Congestion (Yellow discharge, no fever), Sinus Problem, Sore Throat, Hyperlipidemia, and Diabetes    Subjective        Tristan Javier presents to [facility]  History of Present Illness  HPI: Patient is following up on the blood pressure  The patient is taking the blood pressure medications as prescribed and has had no side effects. The patient is also following up on the cholesterol and  sugar and  had lab work done.  The patient also needs refills on medications . The patient is also   complains of cough with green sputum , sore throat and sinus congestion and nasal discharge which is yellow and denies a fever and symptoms started yesterday , patient is also following up on anemia, he had a colonoscopy last year and is currently following up with [facility]  Hyperlipidemia   Pertinent negatives include no chest pain or shortness of breath.   Hypertension   Pertinent negatives include no chest pain, palpitations or shortness of breath.   Diabetes   Pertinent negatives include no vision changes, burning in extremities, low blood sugar,patient does have elevated sugar readings at home    This is a video enabled telemedicine encounter  During today's visit, I reviewed the documented allergies, medications, chief complaint, and pertinent vitals.  I have confirmed with the patient that  "there have been no changes since this information was discussed with my clinical team member.         This is a video enabled telemedicine encounter  During today's visit, I reviewed the documented allergies, medications, chief complaint, and pertinent vitals.  I have confirmed with the patient that there have been no changes since this information was discussed with my clinical team member.    Objective   Vital Signs:  There were no vitals taken for this visit.  Estimated body mass index is 24.18 kg/m² as calculated from the following:    Height as of 11/14/24: 172.7 cm (67.99\").    Weight as of 11/14/24: 72.1 kg (159 lb).    BMI is within normal parameters. No other follow-up for BMI required.      Physical Exam   All vitals recorded within this visit are reported by the patient.  Physical Examination  Vitals and nursing note reviewed  General appearance: Normocephalic and nontraumatic  HEENT: External inspection of eyes, ears and nose appears benign, hearing appears intact  Neck: Neck appears supple, trachea in midline, thyroid appears not enlarged  Respiratory: Respiratory effort appears normal  Musculoskeletal: Moving all limbs  Range of motion of visible joints appears within normal  CNS: No gross motor or sensory deficits  No gross cranial nerve deficits  No tremors  Psychiatry: Alert and oriented x3  Memory appears intact  Mood and affect appears normal  Insight appears normal   Result Review :  The following data was reviewed by: Marybel Fleming MD on 12/18/2024:  Common labs          1/10/2024    12:36 10/2/2024    08:18   Common Labs   Glucose 99  134    BUN 19  34    Creatinine 1.21  1.24    Sodium 136  134    Potassium 4.2  4.7    Chloride 99  98    Calcium 9.6  9.1    Albumin 4.3  4.0    Total Bilirubin 0.4  0.2    Alkaline Phosphatase 81  100    AST (SGOT) 21  34    ALT (SGPT) 18  44    WBC 6.82  7.07    Hemoglobin 13.0  12.3    Hematocrit 39.0  36.6    Platelets 361  375    Total Cholesterol 146  140  "   Triglycerides 110  113    HDL Cholesterol 48  38    LDL Cholesterol  78  81    Hemoglobin A1C 6.10  6.30    Microalbumin, Urine 2.6  <1.2    PSA  1.490                Assessment and Plan   Diagnoses and all orders for this visit:    1. Benign essential hypertension (Primary)  -     carvedilol (COREG) 12.5 MG tablet; Take 1 tablet by mouth 2 (Two) Times a Day With Meals.  Dispense: 180 tablet; Refill: 1  -     lisinopril (PRINIVIL,ZESTRIL) 10 MG tablet; Take 1 tablet by mouth Daily.  Dispense: 90 tablet; Refill: 1    2. Mixed hyperlipidemia  -     CBC (No Diff)  -     Comprehensive Metabolic Panel  -     Lipid Panel  -     atorvastatin (LIPITOR) 80 MG tablet; Take 1 tablet by mouth Daily.  Dispense: 90 tablet; Refill: 1    3. Diabetes mellitus without complication  -     Hemoglobin A1c  -     Microalbumin / Creatinine Urine Ratio - Urine, Clean Catch  -     glimepiride (AMARYL) 2 MG tablet; Take 1 tablet by mouth Daily With Lunch.  Dispense: 90 tablet; Refill: 1    4. Anemia, unspecified type  -     Ambulatory Referral to Hematology    5. Acute bronchitis, unspecified organism  -     azithromycin (Zithromax Z-Antony) 250 MG tablet; Take 2 tablets the first day, then 1 tablet daily for 4 days.  Dispense: 6 tablet; Refill: 0    6. Acute laryngopharyngitis  -     azithromycin (Zithromax Z-Antony) 250 MG tablet; Take 2 tablets the first day, then 1 tablet daily for 4 days.  Dispense: 6 tablet; Refill: 0    7. Acute recurrent maxillary sinusitis  -     azithromycin (Zithromax Z-Antony) 250 MG tablet; Take 2 tablets the first day, then 1 tablet daily for 4 days.  Dispense: 6 tablet; Refill: 0    Other orders  -     albuterol sulfate  (90 Base) MCG/ACT inhaler; Inhale 2 puffs Every 4 (Four) Hours As Needed for Wheezing.  Dispense: 8 g; Refill: 4      Plan:  1.  Benign essential hypertension: Will continue current medication, low-sodium diet advised, Counseled to regularly check BP at home with goal averaging <130/80.    2.mixed hyperlipidemia: Reviewed   fasting CMP and lipid panel.  Diet and exercise counseled,  Will continue current medications  3. Diabetes  Mellitus  :   Reviewed fasting CMP  and hba1c 6.3 , diet and exercise counseled , Will continue current medications  4.  Anemia: Seen by GI, will refer patient to hematology  5  .acute bronchitis: We will start oral antibiotics , continue inhaler q4-6 hrs prn  6.Acute maxillary sinusitis:   will   start oral antibiotics   7. Acute laryngopharyngitis:  will   Start  oral antibiotics         Follow Up   Return in about 3 months (around 3/25/2025).  Patient was given instructions and counseling regarding his condition or for health maintenance advice. Please see specific information pulled into the AVS if appropriate.     Mode of Visit: Video  Location of patient: -HOME-  Location of provider: +[facility]+  You have chosen to receive care through a telehealth visit.  The patient has signed the video visit consent form.  The visit included audio and video interaction. No technical issues occurred during this visit.

## 2025-01-30 ENCOUNTER — OFFICE VISIT (OUTPATIENT)
Dept: CARDIOLOGY | Facility: CLINIC | Age: 65
End: 2025-01-30
Payer: MEDICARE

## 2025-01-30 VITALS
OXYGEN SATURATION: 100 % | HEART RATE: 81 BPM | HEIGHT: 68 IN | BODY MASS INDEX: 24.04 KG/M2 | WEIGHT: 158.6 LBS | SYSTOLIC BLOOD PRESSURE: 126 MMHG | DIASTOLIC BLOOD PRESSURE: 62 MMHG

## 2025-01-30 DIAGNOSIS — E78.00 PURE HYPERCHOLESTEROLEMIA: ICD-10-CM

## 2025-01-30 DIAGNOSIS — I50.22 CHRONIC SYSTOLIC CONGESTIVE HEART FAILURE: ICD-10-CM

## 2025-01-30 DIAGNOSIS — I73.9 PAD (PERIPHERAL ARTERY DISEASE): ICD-10-CM

## 2025-01-30 DIAGNOSIS — I10 BENIGN ESSENTIAL HTN: ICD-10-CM

## 2025-01-30 DIAGNOSIS — I25.810 CORONARY ARTERY DISEASE INVOLVING CORONARY BYPASS GRAFT OF NATIVE HEART WITHOUT ANGINA PECTORIS: Primary | ICD-10-CM

## 2025-01-30 RX ORDER — LINACLOTIDE 290 UG/1
1 CAPSULE, GELATIN COATED ORAL DAILY
COMMUNITY
Start: 2025-01-03

## 2025-01-30 NOTE — PROGRESS NOTES
[facility] Cardiology Office Follow Up Note    Tristan Castle  [id number]  2025    Primary Care Provider: Marybel Fleming MD    Chief Complaint: Routine follow-up    History of Present Illness:   Mr. Tristan Castle is a 64 y.o. male who presents to the Cardiology Clinic for routine follow-up.  The patient has a past medical history significant for hypertension, hyperlipidemia, and chronic tobacco use.  He has a past cardiac history significant for presentation with an NSTEMI in .  At that time, he was found to have severe two-vessel coronary artery disease with severe stenosis in the mid LCx as well as chronic total occlusion of the proximal RCA.  He underwent PCI to the LCx with placement of overlapping JAXSON.  An echocardiogram showed dilated ischemic cardiomyopathy with an LVEF 30-35% as well as mild aortic stenosis.    He also has a history of peripheral arterial disease, with a CTA in  showing occlusion of the proximal left common iliac artery with reconstitution distally via collateralization.  He was also noted to have 70% stenosis in the right common iliac artery.  Since his last appointment, the patient reports he has been doing well from a cardiac standpoint.  He has not had any exertional chest pain or chest discomfort.  He denies exertional dyspnea.  He does have mild lower extremity discomfort with ambulation, however his discomfort does not currently limit his daily activities.  He denies any progression of his claudication symptoms since his last follow-up.  No history of lower extremity wounds or ulcerations.  No other specific complaints today.     Past Cardiac Testin. Last Coronary Angio: 2021              1.  Severe stenosis of the mid LCx treated with overlapping JAXSON (3.0 x 38 mm Xience JAXSON, 3.5 x 23 mm JAXSON)               2.  Chronic total occlusion of the proximal RCA  2. Prior Stress Testing: None  3. Last Echo:               1.  LVEF 40-45%               2.  Grade 1 diastolic dysfunction              3.  Mild aortic stenosis  4. Prior Holter Monitor: None    Review of Systems:   Review of Systems   Constitutional:  Negative for activity change, appetite change, chills, diaphoresis, fatigue, fever, unexpected weight gain and unexpected weight loss.   Eyes:  Negative for blurred vision and double vision.   Respiratory:  Negative for cough, chest tightness, shortness of breath and wheezing.    Cardiovascular:  Negative for chest pain, palpitations and leg swelling.   Gastrointestinal:  Negative for abdominal pain, anal bleeding, blood in stool and GERD.   Endocrine: Negative for cold intolerance and heat intolerance.   Genitourinary:  Negative for hematuria.   Musculoskeletal:         Lower extremity discomfort with ambulation   Neurological:  Negative for dizziness, syncope, weakness and light-headedness.   Hematological:  Does not bruise/bleed easily.   Psychiatric/Behavioral:  Negative for depressed mood and stress. The patient is not nervous/anxious.        I have reviewed and/or updated the patient's past medical, past surgical, family, social history, problem list and allergies as appropriate.     Medications:     Current Outpatient Medications:     albuterol sulfate  (90 Base) MCG/ACT inhaler, Inhale 2 puffs Every 4 (Four) Hours As Needed for Wheezing., Disp: 8 g, Rfl: 4    aspirin (aspirin) 81 MG EC tablet, Take 1 tablet by mouth Daily., Disp: 90 tablet, Rfl: 3    atorvastatin (LIPITOR) 80 MG tablet, Take 1 tablet by mouth Daily., Disp: 90 tablet, Rfl: 1    carvedilol (COREG) 12.5 MG tablet, Take 1 tablet by mouth 2 (Two) Times a Day With Meals., Disp: 180 tablet, Rfl: 1    glimepiride (AMARYL) 2 MG tablet, Take 1 tablet by mouth Daily With Lunch., Disp: 90 tablet, Rfl: 1    glucose blood test strip, Check sugar once a day, Disp: 30 each, Rfl: 12    glucose monitor monitoring kit, Use 1 each Daily., Disp: 1 each, Rfl: 1    Lancets 30G misc, Check  "sugar daily, Disp: 30 each, Rfl: 12    Linzess 290 MCG capsule capsule, Take 1 capsule by mouth Daily., Disp: , Rfl:     lisinopril (PRINIVIL,ZESTRIL) 10 MG tablet, Take 1 tablet by mouth Daily., Disp: 90 tablet, Rfl: 1    azithromycin (Zithromax Z-Antony) 250 MG tablet, Take 2 tablets the first day, then 1 tablet daily for 4 days., Disp: 6 tablet, Rfl: 0    Physical Exam:  Vital Signs:   Vitals:    01/30/25 1104   BP: 126/62   BP Location: Left arm   Patient Position: Sitting   Cuff Size: Adult   Pulse: 81   SpO2: 100%   Weight: 71.9 kg (158 lb 9.6 oz)   Height: 172.7 cm (68\")       Physical Exam  Constitutional:       General: He is not in acute distress.     Appearance: Normal appearance. He is not diaphoretic.   HENT:      Head: Normocephalic and atraumatic.   Cardiovascular:      Rate and Rhythm: Normal rate and regular rhythm.      Heart sounds: No murmur heard.  Pulmonary:      Effort: Pulmonary effort is normal. No respiratory distress.      Breath sounds: Normal breath sounds. No stridor. No wheezing, rhonchi or rales.   Abdominal:      General: Bowel sounds are normal. There is no distension.      Palpations: Abdomen is soft.      Tenderness: There is no abdominal tenderness. There is no guarding or rebound.   Musculoskeletal:         General: No swelling. Normal range of motion.      Cervical back: Neck supple. No tenderness.   Skin:     General: Skin is warm and dry.   Neurological:      General: No focal deficit present.      Mental Status: He is alert and oriented to person, place, and time.   Psychiatric:         Mood and Affect: Mood normal.         Behavior: Behavior normal.         Results Review:   I reviewed the patient's new clinical results.        Assessment / Plan:     1.  Severe two-vessel coronary artery disease  --Presented with an NSTEMI in 9/21, found to have  of the proximal RCA and underwent PCI x2 to the mid LCx  -- Remains active without anginal symptoms  -- Continue aspirin " monotherapy  --Continue high intensity statin  --Follow-up in 1 year, sooner if required     2.  Ischemic cardiomyopathy  --LVEF 40-45% on last assessment in 1/22  -- Euvolemic and well compensated on exam today  --Continue carvedilol and lisinopril  -- Previously unable to tolerate Entresto due to hypotension     3.  Hypertension  -- BP remains adequately controlled     4.  Hyperlipidemia  -- Continue high intensity statin     5.  Peripheral arterial disease  -- Short segment of proximal/ostial left common iliac occlusion on CTA, 70% stenosis right iliac stenosis  -- Claudication symptoms are currently mild, do not inhibit daily activities  -- Self discontinued Pletal  -- Continue aspirin and high intensity statin  -- Advised regular walking exercise program     6.  Chronic tobacco use  -- Complete cessation advised    Follow Up:   Return in about 1 year (around 1/30/2026).      Thank you for allowing me to participate in the care of your patient. Please to not hesitate to contact me with additional questions or concerns.     FRANCES Mckinley MD  Interventional Cardiology   01/30/2025  11:16 EST

## 2025-01-31 DIAGNOSIS — I10 BENIGN ESSENTIAL HYPERTENSION: ICD-10-CM

## 2025-01-31 RX ORDER — LISINOPRIL 10 MG/1
10 TABLET ORAL 2 TIMES DAILY
Qty: 180 TABLET | Refills: 0 | Status: SHIPPED | OUTPATIENT
Start: 2025-01-31

## 2025-06-03 DIAGNOSIS — I10 BENIGN ESSENTIAL HYPERTENSION: ICD-10-CM

## 2025-06-03 RX ORDER — LISINOPRIL 10 MG/1
10 TABLET ORAL 2 TIMES DAILY
Qty: 180 TABLET | Refills: 0 | Status: SHIPPED | OUTPATIENT
Start: 2025-06-03

## 2025-06-24 DIAGNOSIS — E11.9 DIABETES MELLITUS WITHOUT COMPLICATION: ICD-10-CM

## 2025-06-24 DIAGNOSIS — I10 BENIGN ESSENTIAL HYPERTENSION: ICD-10-CM

## 2025-06-24 RX ORDER — LISINOPRIL 10 MG/1
10 TABLET ORAL DAILY
Qty: 90 TABLET | Refills: 1 | Status: SHIPPED | OUTPATIENT
Start: 2025-06-24

## 2025-06-24 RX ORDER — CARVEDILOL 12.5 MG/1
12.5 TABLET ORAL 2 TIMES DAILY WITH MEALS
Qty: 180 TABLET | Refills: 1 | Status: SHIPPED | OUTPATIENT
Start: 2025-06-24

## 2025-06-24 RX ORDER — GLIMEPIRIDE 2 MG/1
2 TABLET ORAL
Qty: 90 TABLET | Refills: 1 | Status: SHIPPED | OUTPATIENT
Start: 2025-06-24

## (undated) DEVICE — GW INQWIRE FC PTFE STD J/1.5 .035 260

## (undated) DEVICE — SNAR POLYP CAPTIVATOR2 RND STFF 2.4 240CM 10MM

## (undated) DEVICE — 12CC CONTROL SYRINGE – FR/TR/RA W/RESERVOIR: Brand: CONTROL SYRINGE

## (undated) DEVICE — GW PT 2 MS .014 185CM STR TP

## (undated) DEVICE — Device

## (undated) DEVICE — ST TBG HYBRID CLEVERCAP FOR OLYMPUS SCOPE 24HR

## (undated) DEVICE — VLV SXN AIR/H2O ORCAPOD3 1P/U STRL

## (undated) DEVICE — ASMBL SPK CONTRST CONTRL

## (undated) DEVICE — CONMED SCOPE SAVER BITE BLOCK, 20X27 MM: Brand: SCOPE SAVER

## (undated) DEVICE — HYBRID TUBING/CAP SET FOR OLYMPUS® SCOPES: Brand: ERBE

## (undated) DEVICE — TR BAND RADIAL ARTERY COMPRESSION DEVICE: Brand: TR BAND

## (undated) DEVICE — GW EMR FIX EXCHG J STD .035 3MM 260CM

## (undated) DEVICE — LUBE JELLY PK/2.75GM STRL BX/144

## (undated) DEVICE — RADIFOCUS OPTITORQUE ANGIOGRAPHIC CATHETER: Brand: OPTITORQUE

## (undated) DEVICE — CONNECT PORT ENDO CLEVERCAP FOR OLYMPUS SCOPE 24HR

## (undated) DEVICE — GUIDE CATHETER: Brand: MACH1™

## (undated) DEVICE — CATH F6 ST JR 4 100CM: Brand: SUPERTORQUE

## (undated) DEVICE — QUICK CATCH IN-LINE SUCTION POLYP TRAP IS USED FOR SUCTION RETRIEVAL OF ENDOSCOPICALLY REMOVED POLYPS.

## (undated) DEVICE — TREK CORONARY DILATATION CATHETER 2.50 MM X 20 MM / RAPID-EXCHANGE: Brand: TREK

## (undated) DEVICE — GLIDESHEATH SLENDER STAINLESS STEEL KIT: Brand: GLIDESHEATH SLENDER

## (undated) DEVICE — FRCP BX RADJAW4 NDL 2.8 240 STD OG

## (undated) DEVICE — SINGLE-USE POLYPECTOMY SNARE: Brand: CAPTIVATOR II

## (undated) DEVICE — INFLATION DEVICE KIT: Brand: ENCORE™ 26 ADVANTAGE KIT

## (undated) DEVICE — ENDOSCOPY PORT CONNECTOR FOR OLYMPUS® SCOPES: Brand: ERBE

## (undated) DEVICE — TRAP POLYP Q/CATCH INLINE

## (undated) DEVICE — CVR PROB ULTRASND GLS STRL

## (undated) DEVICE — GUIDELINER CATHETERS ARE INTENDED TO BE USED IN CONJUNCTION WITH GUIDE CATHETERS TO ACCESS DISCRETE REGIONS OF THE CORONARY AND/OR PERIPHERAL VASCULATURE, AND TO FACILITATE PLACEMENT OF INTERVENTIONAL DEVICES.: Brand: GUIDELINER® V3 CATHETER

## (undated) DEVICE — RUNTHROUGH NS EXTRA FLOPPY PTCA GUIDEWIRE: Brand: RUNTHROUGH

## (undated) DEVICE — CONTRL CONTRST CHMBRD W/TBG72IN REUS